# Patient Record
Sex: FEMALE | Race: WHITE | NOT HISPANIC OR LATINO | URBAN - METROPOLITAN AREA
[De-identification: names, ages, dates, MRNs, and addresses within clinical notes are randomized per-mention and may not be internally consistent; named-entity substitution may affect disease eponyms.]

---

## 2024-11-01 ENCOUNTER — OUTPATIENT (OUTPATIENT)
Dept: OUTPATIENT SERVICES | Facility: HOSPITAL | Age: 79
LOS: 1 days | End: 2024-11-01
Payer: MEDICARE

## 2024-11-01 VITALS
RESPIRATION RATE: 16 BRPM | TEMPERATURE: 98 F | DIASTOLIC BLOOD PRESSURE: 82 MMHG | SYSTOLIC BLOOD PRESSURE: 140 MMHG | OXYGEN SATURATION: 97 % | HEIGHT: 64 IN | WEIGHT: 164.91 LBS | HEART RATE: 65 BPM

## 2024-11-01 DIAGNOSIS — Z95.0 PRESENCE OF CARDIAC PACEMAKER: Chronic | ICD-10-CM

## 2024-11-01 DIAGNOSIS — I48.91 UNSPECIFIED ATRIAL FIBRILLATION: ICD-10-CM

## 2024-11-01 DIAGNOSIS — M41.20 OTHER IDIOPATHIC SCOLIOSIS, SITE UNSPECIFIED: ICD-10-CM

## 2024-11-01 DIAGNOSIS — Z01.818 ENCOUNTER FOR OTHER PREPROCEDURAL EXAMINATION: ICD-10-CM

## 2024-11-01 DIAGNOSIS — G89.29 OTHER CHRONIC PAIN: ICD-10-CM

## 2024-11-01 DIAGNOSIS — Z98.82 BREAST IMPLANT STATUS: Chronic | ICD-10-CM

## 2024-11-01 DIAGNOSIS — Z98.890 OTHER SPECIFIED POSTPROCEDURAL STATES: Chronic | ICD-10-CM

## 2024-11-01 LAB
ANION GAP SERPL CALC-SCNC: 13 MMOL/L — SIGNIFICANT CHANGE UP (ref 5–17)
BLD GP AB SCN SERPL QL: NEGATIVE — SIGNIFICANT CHANGE UP
BUN SERPL-MCNC: 24 MG/DL — HIGH (ref 7–23)
CALCIUM SERPL-MCNC: 10.1 MG/DL — SIGNIFICANT CHANGE UP (ref 8.4–10.5)
CHLORIDE SERPL-SCNC: 100 MMOL/L — SIGNIFICANT CHANGE UP (ref 96–108)
CO2 SERPL-SCNC: 27 MMOL/L — SIGNIFICANT CHANGE UP (ref 22–31)
CREAT SERPL-MCNC: 0.78 MG/DL — SIGNIFICANT CHANGE UP (ref 0.5–1.3)
EGFR: 77 ML/MIN/1.73M2 — SIGNIFICANT CHANGE UP
GLUCOSE SERPL-MCNC: 100 MG/DL — HIGH (ref 70–99)
HCT VFR BLD CALC: 41.9 % — SIGNIFICANT CHANGE UP (ref 34.5–45)
HGB BLD-MCNC: 14 G/DL — SIGNIFICANT CHANGE UP (ref 11.5–15.5)
MCHC RBC-ENTMCNC: 30.7 PG — SIGNIFICANT CHANGE UP (ref 27–34)
MCHC RBC-ENTMCNC: 33.4 G/DL — SIGNIFICANT CHANGE UP (ref 32–36)
MCV RBC AUTO: 91.9 FL — SIGNIFICANT CHANGE UP (ref 80–100)
NRBC # BLD: 0 /100 WBCS — SIGNIFICANT CHANGE UP (ref 0–0)
PLATELET # BLD AUTO: 215 K/UL — SIGNIFICANT CHANGE UP (ref 150–400)
POTASSIUM SERPL-MCNC: 4 MMOL/L — SIGNIFICANT CHANGE UP (ref 3.5–5.3)
POTASSIUM SERPL-SCNC: 4 MMOL/L — SIGNIFICANT CHANGE UP (ref 3.5–5.3)
RBC # BLD: 4.56 M/UL — SIGNIFICANT CHANGE UP (ref 3.8–5.2)
RBC # FLD: 13.2 % — SIGNIFICANT CHANGE UP (ref 10.3–14.5)
RH IG SCN BLD-IMP: POSITIVE — SIGNIFICANT CHANGE UP
SODIUM SERPL-SCNC: 140 MMOL/L — SIGNIFICANT CHANGE UP (ref 135–145)
WBC # BLD: 4.3 K/UL — SIGNIFICANT CHANGE UP (ref 3.8–10.5)
WBC # FLD AUTO: 4.3 K/UL — SIGNIFICANT CHANGE UP (ref 3.8–10.5)

## 2024-11-01 NOTE — H&P PST ADULT - PROBLEM SELECTOR PLAN 1
Instructed to continue Metoprolol to OR  Last dose Xarelto 11/10/24 (per surgeon/ cardiologist advise)

## 2024-11-01 NOTE — H&P PST ADULT - NSICDXPASTSURGICALHX_GEN_ALL_CORE_FT
PAST SURGICAL HISTORY:  H/O breast augmentation     H/O cardiac radiofrequency ablation     Status cardiac pacemaker

## 2024-11-01 NOTE — H&P PST ADULT - PROBLEM SELECTOR PLAN 2
T11-Pelvis posterior stabilization and fusion on 11/19/2024.  Pre op cbc, bmp, aic, t/s & MRSA/MSSA sent.    Instructed to inform surgeon & seek medical attention if any changes in health  status like fever, chills, rash, infections, chest pain or any changes in health status l . PST  instructions given in written/ explained about NPO/ with ERP hydration sheet, PREOP SKIN CARE  with  5% Benzyl  peroxide wash (to back  posterior area) & antibacterial chlorhexidine  skin wash x3 days preop with [place] given with verbal/written .   ARRIVAL TIME  2 hours prior to OR time. Pre-op education provided - all questions answered. Pt verbalized understanding.    Chronic pain consult

## 2024-11-01 NOTE — H&P PST ADULT - NSICDXPASTMEDICALHX_GEN_ALL_CORE_FT
PAST MEDICAL HISTORY:  Afib     Chronic back pain     H/O atrial flutter     H/O Hashimoto thyroiditis     H/O heart block     H/O sick sinus syndrome     HTN (hypertension)

## 2024-11-01 NOTE — H&P PST ADULT - CARDIOVASCULAR COMMENTS
PPM left afib / atrial flutter in 2013 with pauses & sick sinus syndrome , had ablation  in 2016 /placed  on Xarelto & dual chamber PPM ( 2013) dependant placed checked in 06/2024

## 2024-11-01 NOTE — H&P PST ADULT - NSICDXPROCEDURE_GEN_ALL_CORE_FT
PROCEDURES:  Posterior lumbar interbody fusion (PLIF) with instrumentation 01-Nov-2024 08:31:18 T11-Pelvis posterior stabilization and fusion on 11/19/2024. Johnnie Kasper

## 2024-11-01 NOTE — H&P PST ADULT - ASSESSMENT
Chronic back pain   Activity: Walks indoor, exercise with PT cardio/ work up for  30 min 2-3x/week, still working full time, home chores  Energy Expenditure score (DASI SCORE METS): 6.4  Symptoms : denies chest pain, palpitations, dyspnea, dizziness or  REA,  Dental: Patient denies Loose teeth/Denture.   ? CAPRINI SCORE    AGE RELATED RISK FACTORS                                                             [ ] Age 41-60 years                                            (1 Point)  [ ] Age: 61-74 years                                           (2 Points)                 [x ] Age= 75 years                                                (3 Points)             DISEASE RELATED RISK FACTORS                                                       [ ] Edema in the lower extremities                 (1 Point)                     [ ] Varicose veins                                               (1 Point)                                 [x ] BMI > 25 Kg/m2                                            (1 Point)                                  [ ] Serious infection (ie PNA)                            (1 Point)                     [ ] Lung disease ( COPD, Emphysema)            (1 Point)                                                                          [ ] Acute myocardial infarction                         (1 Point)                  [ ] Congestive heart failure (in the previous month)  (1 Point)         [ ] Inflammatory bowel disease                            (1 Point)                  [ ] Central venous access, PICC or Port               (2 points)       (within the last month)                                                                [ ] Stroke (in the previous month)                        (5 Points)    [ ] Previous or present malignancy                       (2 points)                                                                                                                                                         HEMATOLOGY RELATED FACTORS                                                         [ ] Prior episodes of VTE                                     (3 Points)                     [ ] Positive family history for VTE                      (3 Points)                  [ ] Prothrombin 04989 A                                     (3 Points)                     [ ] Factor V Leiden                                                (3 Points)                        [ ] Lupus anticoagulants                                      (3 Points)                                                           [ ] Anticardiolipin antibodies                              (3 Points)                                                       [ ] High homocysteine in the blood                   (3 Points)                                             [ ] Other congenital or acquired thrombophilia      (3 Points)                                                [ ] Heparin induced thrombocytopenia                  (3 Points)                                        MOBILITY RELATED FACTORS  [ ] Bed rest                                                         (1 Point)  [ ] Plaster cast                                                    (2 points)  [ ] Bed bound for more than 72 hours           (2 Points)    GENDER SPECIFIC FACTORS  [ ] Pregnancy or had a baby within the last month   (1 Point)  [ ] Post-partum < 6 weeks                                   (1 Point)  [ ] Hormonal therapy  or oral contraception   (1 Point)  [ ] History of pregnancy complications              (1 point)  [ ] Unexplained or recurrent              (1 Point)    OTHER RISK FACTORS                                           (1 Point)  [ ] BMI >40, smoking, diabetes requiring insulin, chemotherapy  blood transfusions and length of surgery over 2 hours    SURGERY RELATED RISK FACTORS  [ ]  Section within the last month     (1 Point)  [ ] Minor surgery                                                  (1 Point)  [ ] Arthroscopic surgery                                       (2 Points)  [ x] Planned major surgery lasting more            (2 Points)      than 45 minutes     [ ] Elective hip or knee joint replacement       (5 points)       surgery                                                TRAUMA RELATED RISK FACTORS  [ ] Fracture of the hip, pelvis, or leg                       (5 Points)  [ ] Spinal cord injury resulting in paralysis             (5 points)       (in the previous month)    [ ] Paralysis  (less than 1 month)                             (5 Points)  [ ] Multiple Trauma within 1 month                        (5 Points)    Total Score [   6     ]    Caprini Score 0-2: Low Risk, NO VTE prophylaxis required for most patients, encourage ambulation  Caprini Score 3-6: Moderate Risk , pharmacologic VTE prophylaxis is indicated for most patients (in the absence of contraindications)  Caprini Score Greater than or =7: High risk, pharmocologic VTE prophylaxis indicated for most patients (in the absence of contraindications)

## 2024-11-01 NOTE — H&P PST ADULT - HISTORY OF PRESENT ILLNESS
79 year  old RHD  female  with  c/o non radiating lower back pain for  many years, pain become more  troublesome for last 4 yrs  with  severe pain and lower extremity weakness of which sometimes worse in right side is worse . She tried all pain management modalities like NSAIDS, , physical therapy, epidural  injects and facet blocks with no relief at all".   Pt states she is having scoliosis which made symptoms worse with few  episodes of right side sciatic pain. Her pain increases with ambulation and movement and decreases with sitting. Worse pain is standing still. No recent trauma. S/p surgical consult & scheduled for T11-Pelvis posterior stabilization and fusion on 11/19/2024.    PMH : HTN, HLD, Afib atrial flutter in 2013 ( on Xarelto per patient surgeon advised to stop for 10 days last dose 11/10/24), h/o sick sinus syndrome/ haert block,  atrial flutter s/p cardiac ablation x1 ( 2016), PPM  dependent dual chamber status since 2013( revised / replaced batter in 2023/last interrogation in 06/2024). history of breast augmentation  (silicon breast implants) & chronic lower back pain.  ?

## 2024-11-02 LAB
A1C WITH ESTIMATED AVERAGE GLUCOSE RESULT: 5.4 % — SIGNIFICANT CHANGE UP (ref 4–5.6)
ESTIMATED AVERAGE GLUCOSE: 108 MG/DL — SIGNIFICANT CHANGE UP (ref 68–114)
MRSA PCR RESULT.: SIGNIFICANT CHANGE UP
S AUREUS DNA NOSE QL NAA+PROBE: SIGNIFICANT CHANGE UP

## 2024-11-20 ENCOUNTER — APPOINTMENT (OUTPATIENT)
Dept: NEUROSURGERY | Facility: HOSPITAL | Age: 79
End: 2024-11-20

## 2024-11-20 ENCOUNTER — INPATIENT (INPATIENT)
Facility: HOSPITAL | Age: 79
LOS: 11 days | Discharge: NURSING, REHAB OR LONG-TERM CARE | DRG: 427 | End: 2024-12-02
Attending: NEUROLOGICAL SURGERY | Admitting: NEUROLOGICAL SURGERY
Payer: MEDICARE

## 2024-11-20 VITALS
OXYGEN SATURATION: 95 % | RESPIRATION RATE: 18 BRPM | WEIGHT: 164.91 LBS | TEMPERATURE: 98 F | HEART RATE: 73 BPM | HEIGHT: 64.02 IN | DIASTOLIC BLOOD PRESSURE: 76 MMHG | SYSTOLIC BLOOD PRESSURE: 154 MMHG

## 2024-11-20 DIAGNOSIS — Z95.0 PRESENCE OF CARDIAC PACEMAKER: Chronic | ICD-10-CM

## 2024-11-20 DIAGNOSIS — G89.29 OTHER CHRONIC PAIN: ICD-10-CM

## 2024-11-20 DIAGNOSIS — Z98.82 BREAST IMPLANT STATUS: Chronic | ICD-10-CM

## 2024-11-20 DIAGNOSIS — M41.20 OTHER IDIOPATHIC SCOLIOSIS, SITE UNSPECIFIED: ICD-10-CM

## 2024-11-20 DIAGNOSIS — Z98.890 OTHER SPECIFIED POSTPROCEDURAL STATES: Chronic | ICD-10-CM

## 2024-11-20 LAB
ADD ON TEST-SPECIMEN IN LAB: SIGNIFICANT CHANGE UP
ANION GAP SERPL CALC-SCNC: 17 MMOL/L — SIGNIFICANT CHANGE UP (ref 5–17)
APTT BLD: 27.9 SEC — SIGNIFICANT CHANGE UP (ref 24.5–35.6)
APTT BLD: 30.3 SEC — SIGNIFICANT CHANGE UP (ref 24.5–35.6)
BASOPHILS # BLD AUTO: 0.01 K/UL — SIGNIFICANT CHANGE UP (ref 0–0.2)
BASOPHILS NFR BLD AUTO: 0.1 % — SIGNIFICANT CHANGE UP (ref 0–2)
BUN SERPL-MCNC: 19 MG/DL — SIGNIFICANT CHANGE UP (ref 7–23)
CALCIUM SERPL-MCNC: 8.4 MG/DL — SIGNIFICANT CHANGE UP (ref 8.4–10.5)
CHLORIDE SERPL-SCNC: 102 MMOL/L — SIGNIFICANT CHANGE UP (ref 96–108)
CO2 SERPL-SCNC: 22 MMOL/L — SIGNIFICANT CHANGE UP (ref 22–31)
CREAT SERPL-MCNC: 0.74 MG/DL — SIGNIFICANT CHANGE UP (ref 0.5–1.3)
EGFR: 82 ML/MIN/1.73M2 — SIGNIFICANT CHANGE UP
EOSINOPHIL # BLD AUTO: 0.01 K/UL — SIGNIFICANT CHANGE UP (ref 0–0.5)
EOSINOPHIL NFR BLD AUTO: 0.1 % — SIGNIFICANT CHANGE UP (ref 0–6)
GAS PNL BLDA: SIGNIFICANT CHANGE UP
GLUCOSE BLDC GLUCOMTR-MCNC: 113 MG/DL — HIGH (ref 70–99)
GLUCOSE SERPL-MCNC: 162 MG/DL — HIGH (ref 70–99)
HCT VFR BLD CALC: 28.2 % — LOW (ref 34.5–45)
HCT VFR BLD CALC: 29.5 % — LOW (ref 34.5–45)
HGB BLD-MCNC: 9.4 G/DL — LOW (ref 11.5–15.5)
HGB BLD-MCNC: 9.9 G/DL — LOW (ref 11.5–15.5)
IMM GRANULOCYTES NFR BLD AUTO: 0.8 % — SIGNIFICANT CHANGE UP (ref 0–0.9)
INR BLD: 1.03 RATIO — SIGNIFICANT CHANGE UP (ref 0.85–1.16)
INR BLD: 1.06 RATIO — SIGNIFICANT CHANGE UP (ref 0.85–1.16)
LYMPHOCYTES # BLD AUTO: 0.62 K/UL — LOW (ref 1–3.3)
LYMPHOCYTES # BLD AUTO: 7.2 % — LOW (ref 13–44)
MAGNESIUM SERPL-MCNC: 2 MG/DL — SIGNIFICANT CHANGE UP (ref 1.6–2.6)
MCHC RBC-ENTMCNC: 29.2 PG — SIGNIFICANT CHANGE UP (ref 27–34)
MCHC RBC-ENTMCNC: 29.5 PG — SIGNIFICANT CHANGE UP (ref 27–34)
MCHC RBC-ENTMCNC: 33.3 G/DL — SIGNIFICANT CHANGE UP (ref 32–36)
MCHC RBC-ENTMCNC: 33.6 G/DL — SIGNIFICANT CHANGE UP (ref 32–36)
MCV RBC AUTO: 87.6 FL — SIGNIFICANT CHANGE UP (ref 80–100)
MCV RBC AUTO: 87.8 FL — SIGNIFICANT CHANGE UP (ref 80–100)
MONOCYTES # BLD AUTO: 0.62 K/UL — SIGNIFICANT CHANGE UP (ref 0–0.9)
MONOCYTES NFR BLD AUTO: 7.2 % — SIGNIFICANT CHANGE UP (ref 2–14)
NEUTROPHILS # BLD AUTO: 7.28 K/UL — SIGNIFICANT CHANGE UP (ref 1.8–7.4)
NEUTROPHILS NFR BLD AUTO: 84.6 % — HIGH (ref 43–77)
NRBC # BLD: 0 /100 WBCS — SIGNIFICANT CHANGE UP (ref 0–0)
NRBC # BLD: 0 /100 WBCS — SIGNIFICANT CHANGE UP (ref 0–0)
PHOSPHATE SERPL-MCNC: 4 MG/DL — SIGNIFICANT CHANGE UP (ref 2.5–4.5)
PLATELET # BLD AUTO: 176 K/UL — SIGNIFICANT CHANGE UP (ref 150–400)
PLATELET # BLD AUTO: 207 K/UL — SIGNIFICANT CHANGE UP (ref 150–400)
POTASSIUM SERPL-MCNC: 3.8 MMOL/L — SIGNIFICANT CHANGE UP (ref 3.5–5.3)
POTASSIUM SERPL-SCNC: 3.8 MMOL/L — SIGNIFICANT CHANGE UP (ref 3.5–5.3)
PROTHROM AB SERPL-ACNC: 11.7 SEC — SIGNIFICANT CHANGE UP (ref 9.9–13.4)
PROTHROM AB SERPL-ACNC: 12.1 SEC — SIGNIFICANT CHANGE UP (ref 9.9–13.4)
RBC # BLD: 3.22 M/UL — LOW (ref 3.8–5.2)
RBC # BLD: 3.36 M/UL — LOW (ref 3.8–5.2)
RBC # FLD: 14.2 % — SIGNIFICANT CHANGE UP (ref 10.3–14.5)
RBC # FLD: 14.6 % — HIGH (ref 10.3–14.5)
SODIUM SERPL-SCNC: 141 MMOL/L — SIGNIFICANT CHANGE UP (ref 135–145)
WBC # BLD: 10.34 K/UL — SIGNIFICANT CHANGE UP (ref 3.8–10.5)
WBC # BLD: 8.61 K/UL — SIGNIFICANT CHANGE UP (ref 3.8–10.5)
WBC # FLD AUTO: 10.34 K/UL — SIGNIFICANT CHANGE UP (ref 3.8–10.5)
WBC # FLD AUTO: 8.61 K/UL — SIGNIFICANT CHANGE UP (ref 3.8–10.5)

## 2024-11-20 PROCEDURE — 22843 INSERT SPINE FIXATION DEVICE: CPT

## 2024-11-20 PROCEDURE — 22216 INCIS ADDL SPINE SEGMENT: CPT

## 2024-11-20 PROCEDURE — 86923 COMPATIBILITY TEST ELECTRIC: CPT

## 2024-11-20 PROCEDURE — 86900 BLOOD TYPING SEROLOGIC ABO: CPT

## 2024-11-20 PROCEDURE — 22614 ARTHRD PST TQ 1NTRSPC EA ADD: CPT

## 2024-11-20 PROCEDURE — 80048 BASIC METABOLIC PNL TOTAL CA: CPT

## 2024-11-20 PROCEDURE — 87640 STAPH A DNA AMP PROBE: CPT

## 2024-11-20 PROCEDURE — 22853 INSJ BIOMECHANICAL DEVICE: CPT

## 2024-11-20 PROCEDURE — 85027 COMPLETE CBC AUTOMATED: CPT

## 2024-11-20 PROCEDURE — 22634 ARTHRD CMBN 1NTRSPC EA ADDL: CPT

## 2024-11-20 PROCEDURE — 22214 INCIS 1 VERTEBRAL SEG LUMBAR: CPT

## 2024-11-20 PROCEDURE — G0463: CPT

## 2024-11-20 PROCEDURE — 72080 X-RAY EXAM THORACOLMB 2/> VW: CPT | Mod: 26

## 2024-11-20 PROCEDURE — 22633 ARTHRD CMBN 1NTRSPC LUMBAR: CPT | Mod: 22

## 2024-11-20 PROCEDURE — 71045 X-RAY EXAM CHEST 1 VIEW: CPT | Mod: 26

## 2024-11-20 PROCEDURE — 83036 HEMOGLOBIN GLYCOSYLATED A1C: CPT

## 2024-11-20 PROCEDURE — 86850 RBC ANTIBODY SCREEN: CPT

## 2024-11-20 PROCEDURE — 27280 ARTHR SI JT OPN B1GRF INSTRM: CPT | Mod: 50

## 2024-11-20 PROCEDURE — 87641 MR-STAPH DNA AMP PROBE: CPT

## 2024-11-20 PROCEDURE — 86901 BLOOD TYPING SEROLOGIC RH(D): CPT

## 2024-11-20 DEVICE — KIT A-LINE 1LUM 20G X 12CM SAFE KIT: Type: IMPLANTABLE DEVICE | Site: BILATERAL | Status: FUNCTIONAL

## 2024-11-20 DEVICE — GRAFT BONE INFUSE KIT LG II: Type: IMPLANTABLE DEVICE | Site: BILATERAL | Status: FUNCTIONAL

## 2024-11-20 DEVICE — IMPLANTABLE DEVICE: Type: IMPLANTABLE DEVICE | Site: BILATERAL | Status: FUNCTIONAL

## 2024-11-20 DEVICE — SURGIFOAM PAD 8CM X 12.5CM X 10MM (100): Type: IMPLANTABLE DEVICE | Site: BILATERAL | Status: FUNCTIONAL

## 2024-11-20 DEVICE — IMP CEMENT SYS CONFIDENCE KIT 5CC: Type: IMPLANTABLE DEVICE | Site: BILATERAL | Status: FUNCTIONAL

## 2024-11-20 DEVICE — SET SCREW SPINE T27 5.5/6.0MM: Type: IMPLANTABLE DEVICE | Site: BILATERAL | Status: FUNCTIONAL

## 2024-11-20 DEVICE — FLOSEAL WITH RECOTHROM THROMBIN 10ML: Type: IMPLANTABLE DEVICE | Site: BILATERAL | Status: FUNCTIONAL

## 2024-11-20 RX ORDER — METOPROLOL TARTRATE 100 MG/1
75 TABLET, FILM COATED ORAL
Refills: 0 | Status: DISCONTINUED | OUTPATIENT
Start: 2024-11-20 | End: 2024-11-22

## 2024-11-20 RX ORDER — OXYCODONE HYDROCHLORIDE 30 MG/1
5 TABLET ORAL EVERY 4 HOURS
Refills: 0 | Status: DISCONTINUED | OUTPATIENT
Start: 2024-11-20 | End: 2024-11-21

## 2024-11-20 RX ORDER — LIDOCAINE HCL 20 MG/ML
0.2 VIAL (ML) INJECTION ONCE
Refills: 0 | Status: DISCONTINUED | OUTPATIENT
Start: 2024-11-20 | End: 2024-11-20

## 2024-11-20 RX ORDER — APREPITANT 40 MG/1
40 CAPSULE ORAL ONCE
Refills: 0 | Status: COMPLETED | OUTPATIENT
Start: 2024-11-20 | End: 2024-11-20

## 2024-11-20 RX ORDER — ACETAMINOPHEN 500MG 500 MG/1
1000 TABLET, COATED ORAL ONCE
Refills: 0 | Status: COMPLETED | OUTPATIENT
Start: 2024-11-20 | End: 2024-11-20

## 2024-11-20 RX ORDER — HYDROMORPHONE HYDROCHLORIDE 2 MG/1
30 TABLET ORAL
Refills: 0 | Status: DISCONTINUED | OUTPATIENT
Start: 2024-11-20 | End: 2024-11-21

## 2024-11-20 RX ORDER — TRIAMTERENE/HYDROCHLOROTHIAZID 37.5-25 MG
1 CAPSULE ORAL
Refills: 0 | DISCHARGE

## 2024-11-20 RX ORDER — ROSUVASTATIN CALCIUM 10 MG
1 TABLET ORAL
Refills: 0 | DISCHARGE

## 2024-11-20 RX ORDER — METOPROLOL TARTRATE 50 MG
1 TABLET ORAL
Refills: 0 | DISCHARGE

## 2024-11-20 RX ORDER — SENNOSIDES 8.6 MG
2 TABLET ORAL AT BEDTIME
Refills: 0 | Status: DISCONTINUED | OUTPATIENT
Start: 2024-11-20 | End: 2024-11-22

## 2024-11-20 RX ORDER — METOPROLOL TARTRATE 100 MG/1
75 TABLET, FILM COATED ORAL
Refills: 0 | Status: DISCONTINUED | OUTPATIENT
Start: 2024-11-20 | End: 2024-11-20

## 2024-11-20 RX ORDER — HYDROMORPHONE HYDROCHLORIDE 2 MG/1
0.2 TABLET ORAL
Refills: 0 | Status: DISCONTINUED | OUTPATIENT
Start: 2024-11-20 | End: 2024-11-20

## 2024-11-20 RX ORDER — 0.9 % SODIUM CHLORIDE 0.9 %
1000 INTRAVENOUS SOLUTION INTRAVENOUS
Refills: 0 | Status: DISCONTINUED | OUTPATIENT
Start: 2024-11-20 | End: 2024-11-22

## 2024-11-20 RX ORDER — CEFAZOLIN SODIUM 10 G
2000 VIAL (EA) INJECTION EVERY 8 HOURS
Refills: 0 | Status: COMPLETED | OUTPATIENT
Start: 2024-11-20 | End: 2024-11-25

## 2024-11-20 RX ORDER — HYDROMORPHONE HYDROCHLORIDE 2 MG/1
0.5 TABLET ORAL
Refills: 0 | Status: DISCONTINUED | OUTPATIENT
Start: 2024-11-20 | End: 2024-11-20

## 2024-11-20 RX ORDER — ACETAMINOPHEN 500MG 500 MG/1
1000 TABLET, COATED ORAL EVERY 8 HOURS
Refills: 0 | Status: DISCONTINUED | OUTPATIENT
Start: 2024-11-20 | End: 2024-11-20

## 2024-11-20 RX ORDER — NICARDIPINE HYDROCHLORIDE 2.5 MG/ML
5 INJECTION INTRAVENOUS
Qty: 40 | Refills: 0 | Status: DISCONTINUED | OUTPATIENT
Start: 2024-11-20 | End: 2024-11-21

## 2024-11-20 RX ORDER — PROPOFOL 10 MG/ML
50 INJECTION, EMULSION INTRAVENOUS
Qty: 1000 | Refills: 0 | Status: DISCONTINUED | OUTPATIENT
Start: 2024-11-20 | End: 2024-11-21

## 2024-11-20 RX ORDER — NALOXONE HCL 0.4 MG/ML
0.1 AMPUL (ML) INJECTION
Refills: 0 | Status: DISCONTINUED | OUTPATIENT
Start: 2024-11-20 | End: 2024-11-21

## 2024-11-20 RX ORDER — TRIAMTERENE AND HYDROCHLOROTHIAZIDE 25; 37.5 MG/1; MG/1
1 CAPSULE ORAL DAILY
Refills: 0 | Status: DISCONTINUED | OUTPATIENT
Start: 2024-11-20 | End: 2024-11-22

## 2024-11-20 RX ORDER — CHLORHEXIDINE GLUCONATE 1.2 MG/ML
1 RINSE ORAL DAILY
Refills: 0 | Status: DISCONTINUED | OUTPATIENT
Start: 2024-11-20 | End: 2024-11-22

## 2024-11-20 RX ORDER — CHLORHEXIDINE GLUCONATE 1.2 MG/ML
1 RINSE ORAL ONCE
Refills: 0 | Status: DISCONTINUED | OUTPATIENT
Start: 2024-11-20 | End: 2024-11-20

## 2024-11-20 RX ORDER — ONDANSETRON HYDROCHLORIDE 4 MG/1
4 TABLET, FILM COATED ORAL ONCE
Refills: 0 | Status: DISCONTINUED | OUTPATIENT
Start: 2024-11-20 | End: 2024-11-22

## 2024-11-20 RX ORDER — CHLORHEXIDINE GLUCONATE 1.2 MG/ML
15 RINSE ORAL EVERY 12 HOURS
Refills: 0 | Status: DISCONTINUED | OUTPATIENT
Start: 2024-11-20 | End: 2024-11-21

## 2024-11-20 RX ORDER — PROPOFOL 10 MG/ML
10 INJECTION, EMULSION INTRAVENOUS
Qty: 1000 | Refills: 0 | Status: DISCONTINUED | OUTPATIENT
Start: 2024-11-20 | End: 2024-11-20

## 2024-11-20 RX ORDER — CEFAZOLIN SODIUM 10 G
2000 VIAL (EA) INJECTION ONCE
Refills: 0 | Status: COMPLETED | OUTPATIENT
Start: 2024-11-20 | End: 2024-11-20

## 2024-11-20 RX ORDER — ACETAMINOPHEN 500MG 500 MG/1
650 TABLET, COATED ORAL EVERY 6 HOURS
Refills: 0 | Status: DISCONTINUED | OUTPATIENT
Start: 2024-11-20 | End: 2024-11-22

## 2024-11-20 RX ORDER — DEXMEDETOMIDINE HYDROCHLORIDE 4 UG/ML
0.2 INJECTION, SOLUTION INTRAVENOUS
Qty: 200 | Refills: 0 | Status: DISCONTINUED | OUTPATIENT
Start: 2024-11-20 | End: 2024-11-21

## 2024-11-20 RX ORDER — SODIUM CHLORIDE 9 MG/ML
3 INJECTION, SOLUTION INTRAMUSCULAR; INTRAVENOUS; SUBCUTANEOUS EVERY 8 HOURS
Refills: 0 | Status: DISCONTINUED | OUTPATIENT
Start: 2024-11-20 | End: 2024-11-20

## 2024-11-20 RX ORDER — GABAPENTIN 300 MG/1
300 CAPSULE ORAL
Refills: 0 | Status: DISCONTINUED | OUTPATIENT
Start: 2024-11-20 | End: 2024-11-21

## 2024-11-20 RX ORDER — POLYETHYLENE GLYCOL 3350 17 G/17G
17 POWDER, FOR SOLUTION ORAL DAILY
Refills: 0 | Status: DISCONTINUED | OUTPATIENT
Start: 2024-11-20 | End: 2024-11-21

## 2024-11-20 RX ORDER — METHOCARBAMOL 500 MG/1
750 TABLET, FILM COATED ORAL EVERY 8 HOURS
Refills: 0 | Status: DISCONTINUED | OUTPATIENT
Start: 2024-11-20 | End: 2024-11-22

## 2024-11-20 RX ORDER — ROSUVASTATIN CALCIUM 5 MG/1
10 TABLET, FILM COATED ORAL AT BEDTIME
Refills: 0 | Status: DISCONTINUED | OUTPATIENT
Start: 2024-11-20 | End: 2024-11-22

## 2024-11-20 RX ORDER — POTASSIUM CHLORIDE 600 MG/1
10 TABLET, EXTENDED RELEASE ORAL
Refills: 0 | Status: COMPLETED | OUTPATIENT
Start: 2024-11-20 | End: 2024-11-21

## 2024-11-20 RX ORDER — ONDANSETRON HYDROCHLORIDE 4 MG/1
4 TABLET, FILM COATED ORAL EVERY 6 HOURS
Refills: 0 | Status: DISCONTINUED | OUTPATIENT
Start: 2024-11-20 | End: 2024-11-20

## 2024-11-20 RX ADMIN — Medication 100 MILLIGRAM(S): at 21:24

## 2024-11-20 RX ADMIN — Medication 0.5 MILLILITER(S): at 23:59

## 2024-11-20 RX ADMIN — Medication 75 MILLILITER(S): at 21:30

## 2024-11-20 RX ADMIN — APREPITANT 40 MILLIGRAM(S): 40 CAPSULE ORAL at 06:29

## 2024-11-20 RX ADMIN — ACETAMINOPHEN 500MG 1000 MILLIGRAM(S): 500 TABLET, COATED ORAL at 06:28

## 2024-11-20 RX ADMIN — DEXMEDETOMIDINE HYDROCHLORIDE 3.74 MICROGRAM(S)/KG/HR: 4 INJECTION, SOLUTION INTRAVENOUS at 21:23

## 2024-11-20 RX ADMIN — POTASSIUM CHLORIDE 100 MILLIEQUIVALENT(S): 600 TABLET, EXTENDED RELEASE ORAL at 23:00

## 2024-11-20 NOTE — PROGRESS NOTE ADULT - ASSESSMENT
ASSESSMENT/PLAN:    NEURO:  Activity: [] mobilize as tolerated [] Bedrest [] PT [] OT [] PMNR    PULM:    CV:  SBP goal    RENAL:  Fluids:    GI:  Diet:  GI prophylaxis [] not indicated [] PPI [] other:  Bowel regimen [] colace [] senna [] other:    ENDO:   Goal euglycemia (-180)    HEME/ONC:  VTE prophylaxis: [] SCDs [] chemoprophylaxis [] hold chemoprophylaxis due to: [] high risk of DVT/PE on admission due to:    ID:    MISC:    SOCIAL/FAMILY:  [] awaiting [] updated at bedside [] family meeting    CODE STATUS:  [] Full Code [] DNR [] DNI [] Palliative/Comfort Care    DISPOSITION:  [] ICU [] Stroke Unit [] Floor [] EMU [] RCU [] PCU    [] Patient is at high risk of neurologic deterioration/death due to:     Time seen:  Time spent: ___ [] critical care minutes    Contact: [phone] ASSESSMENT/PLAN:    NEURO:  -POD0 T11-pelvis fusion, L4-5, L5-S1 TLIFs, T12-L1, L1-2, L3-4 PCOs.   -CT T/L-spine pending.  -TLSO brace for OOB.  -2 deep HMVs, 1 superficial HMV, Prevena per NSGY.  -Standing scoli XR films pending.   Activity: [] mobilize as tolerated [] Bedrest [] PT [] OT [] PMNR    PULM:  Wean to extubate.  Racemic epi given absence of cuff leak.    CV:  SBP goal 100-160.    RENAL:  Fluids: IV LR at 75cc/hr    GI:  Diet: NPO for possible extubation in AM  GI prophylaxis [] not indicated [X] PPI [] other:  Bowel regimen [] colace [] senna [] other: Miralax    ENDO:   Goal euglycemia (-180)    HEME/ONC:  VTE prophylaxis: SCDs  Received 3u pRBC, 3u FFP, and 2u plt intraop.    ID:  Ancef while HMV drains in.    MISC:    SOCIAL/FAMILY:  [] awaiting [X] updated at bedside [] family meeting    CODE STATUS:  [X] Full Code [] DNR [] DNI [] Palliative/Comfort Care    DISPOSITION:  [X] ICU [] Stroke Unit [] Floor [] EMU [] RCU [] PCU

## 2024-11-20 NOTE — BRIEF OPERATIVE NOTE - OPERATION/FINDINGS
T11-pelvis instrumentation and fusion; L4-L5, L5-S1 TLIFs, T12-L1, L1-L2, L2-L3, L3-L4 posterior column osteotomies for coronal scoliosis deformity correction

## 2024-11-20 NOTE — PROGRESS NOTE ADULT - SUBJECTIVE AND OBJECTIVE BOX
SUMMARY:    HOSPITAL COURSE:    ADMISSION SCORES:   GCS: HH: MF: NIHSS: ICH Score:    REVIEW OF SYSTEMS: None other than stated in HPI    ALLERGIES: Allergies    No Known Allergies    Intolerances        VITALS/DATA/ORDERS:    T(C): 36.7 (11-20-24 @ 20:15), Max: 36.7 (11-20-24 @ 20:15)  HR: 60 (11-20-24 @ 22:30) (60 - 92)  BP: 154/76 (11-20-24 @ 06:51) (154/76 - 154/76)  RR: 18 (11-20-24 @ 22:30) (16 - 20)  SpO2: 100% (11-20-24 @ 22:30) (95% - 100%)                          9.4    8.61  )-----------( 176      ( 20 Nov 2024 20:47 )             28.2       11-20    141  |  102  |  19  ----------------------------<  162[H]  3.8   |  22  |  0.74    Ca    8.4      20 Nov 2024 20:47            ABG - ( 20 Nov 2024 17:15 )  pH, Arterial: 7.36  pH, Blood: x     /  pCO2: 45    /  pO2: 187   / HCO3: 25    / Base Excess: -0.3  /  SaO2: 99.4                PT/INR - ( 20 Nov 2024 20:47 )   PT: 11.7 sec;   INR: 1.03 ratio         PTT - ( 20 Nov 2024 20:47 )  PTT:27.9 sec              acetaminophen     Tablet .. 1000 milliGRAM(s) Oral every 8 hours  ceFAZolin   IVPB 2000 milliGRAM(s) IV Intermittent every 8 hours  chlorhexidine 0.12% Liquid 15 milliLiter(s) Oral Mucosa every 12 hours  dexMEDEtomidine Infusion 0.2 MICROgram(s)/kG/Hr IV Continuous <Continuous>  gabapentin 300 milliGRAM(s) Oral two times a day  HYDROmorphone  Injectable 0.2 milliGRAM(s) IV Push every 10 minutes PRN  HYDROmorphone  Injectable 0.5 milliGRAM(s) IV Push every 10 minutes PRN  HYDROmorphone PCA (1 mG/mL) 30 milliLiter(s) PCA Continuous PCA Continuous  lactated ringers. 1000 milliLiter(s) IV Continuous <Continuous>  methocarbamol 750 milliGRAM(s) Oral every 8 hours  metoprolol tartrate 75 milliGRAM(s) Oral two times a day  naloxone Injectable 0.1 milliGRAM(s) IV Push every 3 minutes PRN  niCARdipine Infusion 5 mG/Hr IV Continuous <Continuous>  ondansetron Injectable 4 milliGRAM(s) IV Push every 6 hours PRN  ondansetron Injectable 4 milliGRAM(s) IV Push once PRN  oxyCODONE    IR 5 milliGRAM(s) Oral every 4 hours PRN  potassium chloride  10 mEq/100 mL IVPB 10 milliEquivalent(s) IV Intermittent every 1 hour  propofol Infusion 50 MICROgram(s)/kG/Min IV Continuous <Continuous>  rosuvastatin 10 milliGRAM(s) Oral at bedtime  senna 2 Tablet(s) Oral at bedtime  triamterene 37.5 mG/hydrochlorothiazide 25 mG Tablet 1 Tablet(s) Oral daily      EXAMINATION:  General: No acute distress  HEENT: Anicteric sclerae  Cardiac: [id number]  Lungs: Clear  Abdomen: Soft, non-tender, +BS  Extremities: No c/c/e  Skin/Incision Site: Clean, dry and intact  Neurologic: Awake, alert, fully oriented, follows commands, PERRL, VFFtc, EOMI, face symmetric, tongue midline, no drift, full strength SUMMARY:  79F, admitted for T11-pelvis fusion to address LBP and coronal scoliosis. PMH AFib/flutter (Eliquis stopped for OR), HTN/HLD.     ICU COURSE:  11/20: admitted to NSCU s/p OR    REVIEW OF SYSTEMS: None other than stated in HPI    ALLERGIES: Allergies    No Known Allergies    Intolerances        VITALS/DATA/ORDERS:    T(C): 36.7 (11-20-24 @ 20:15), Max: 36.7 (11-20-24 @ 20:15)  HR: 60 (11-20-24 @ 22:30) (60 - 92)  BP: 154/76 (11-20-24 @ 06:51) (154/76 - 154/76)  RR: 18 (11-20-24 @ 22:30) (16 - 20)  SpO2: 100% (11-20-24 @ 22:30) (95% - 100%)                          9.4    8.61  )-----------( 176      ( 20 Nov 2024 20:47 )             28.2       11-20    141  |  102  |  19  ----------------------------<  162[H]  3.8   |  22  |  0.74    Ca    8.4      20 Nov 2024 20:47            ABG - ( 20 Nov 2024 17:15 )  pH, Arterial: 7.36  pH, Blood: x     /  pCO2: 45    /  pO2: 187   / HCO3: 25    / Base Excess: -0.3  /  SaO2: 99.4                PT/INR - ( 20 Nov 2024 20:47 )   PT: 11.7 sec;   INR: 1.03 ratio         PTT - ( 20 Nov 2024 20:47 )  PTT:27.9 sec              acetaminophen     Tablet .. 1000 milliGRAM(s) Oral every 8 hours  ceFAZolin   IVPB 2000 milliGRAM(s) IV Intermittent every 8 hours  chlorhexidine 0.12% Liquid 15 milliLiter(s) Oral Mucosa every 12 hours  dexMEDEtomidine Infusion 0.2 MICROgram(s)/kG/Hr IV Continuous <Continuous>  gabapentin 300 milliGRAM(s) Oral two times a day  HYDROmorphone  Injectable 0.2 milliGRAM(s) IV Push every 10 minutes PRN  HYDROmorphone  Injectable 0.5 milliGRAM(s) IV Push every 10 minutes PRN  HYDROmorphone PCA (1 mG/mL) 30 milliLiter(s) PCA Continuous PCA Continuous  lactated ringers. 1000 milliLiter(s) IV Continuous <Continuous>  methocarbamol 750 milliGRAM(s) Oral every 8 hours  metoprolol tartrate 75 milliGRAM(s) Oral two times a day  naloxone Injectable 0.1 milliGRAM(s) IV Push every 3 minutes PRN  niCARdipine Infusion 5 mG/Hr IV Continuous <Continuous>  ondansetron Injectable 4 milliGRAM(s) IV Push every 6 hours PRN  ondansetron Injectable 4 milliGRAM(s) IV Push once PRN  oxyCODONE    IR 5 milliGRAM(s) Oral every 4 hours PRN  potassium chloride  10 mEq/100 mL IVPB 10 milliEquivalent(s) IV Intermittent every 1 hour  propofol Infusion 50 MICROgram(s)/kG/Min IV Continuous <Continuous>  rosuvastatin 10 milliGRAM(s) Oral at bedtime  senna 2 Tablet(s) Oral at bedtime  triamterene 37.5 mG/hydrochlorothiazide 25 mG Tablet 1 Tablet(s) Oral daily      EXAMINATION:  General: No acute distress  HEENT: Anicteric sclerae  Cardiac: [id number]  Lungs: Clear  Abdomen: Soft, non-tender, +BS  Extremities: No c/c/e  Skin/Incision Site: Clean, dry and intact  Neurologic: intubated, FC, LLE 5/5, RHF 4-/5 (pain-limited), RKE 4/5, RDF 4/5, RPF 5/5.

## 2024-11-20 NOTE — PATIENT PROFILE ADULT - FALL HARM RISK - UNIVERSAL INTERVENTIONS
Bed in lowest position, wheels locked, appropriate side rails in place/Call bell, personal items and telephone in reach/Instruct patient to call for assistance before getting out of bed or chair/Non-slip footwear when patient is out of bed/[place] to call system/Physically safe environment - no spills, clutter or unnecessary equipment/Purposeful Proactive Rounding/Room/bathroom lighting operational, light cord in reach

## 2024-11-21 LAB
ANION GAP SERPL CALC-SCNC: 11 MMOL/L — SIGNIFICANT CHANGE UP (ref 5–17)
BUN SERPL-MCNC: 19 MG/DL — SIGNIFICANT CHANGE UP (ref 7–23)
CALCIUM SERPL-MCNC: 8.1 MG/DL — LOW (ref 8.4–10.5)
CHLORIDE SERPL-SCNC: 103 MMOL/L — SIGNIFICANT CHANGE UP (ref 96–108)
CO2 SERPL-SCNC: 24 MMOL/L — SIGNIFICANT CHANGE UP (ref 22–31)
CREAT SERPL-MCNC: 0.65 MG/DL — SIGNIFICANT CHANGE UP (ref 0.5–1.3)
EGFR: 90 ML/MIN/1.73M2 — SIGNIFICANT CHANGE UP
GLUCOSE BLDC GLUCOMTR-MCNC: 145 MG/DL — HIGH (ref 70–99)
GLUCOSE BLDC GLUCOMTR-MCNC: 154 MG/DL — HIGH (ref 70–99)
GLUCOSE SERPL-MCNC: 149 MG/DL — HIGH (ref 70–99)
HCT VFR BLD CALC: 23.6 % — LOW (ref 34.5–45)
HGB BLD-MCNC: 8 G/DL — LOW (ref 11.5–15.5)
MAGNESIUM SERPL-MCNC: 1.9 MG/DL — SIGNIFICANT CHANGE UP (ref 1.6–2.6)
MCHC RBC-ENTMCNC: 29.2 PG — SIGNIFICANT CHANGE UP (ref 27–34)
MCHC RBC-ENTMCNC: 33.9 G/DL — SIGNIFICANT CHANGE UP (ref 32–36)
MCV RBC AUTO: 86.1 FL — SIGNIFICANT CHANGE UP (ref 80–100)
NRBC # BLD: 0 /100 WBCS — SIGNIFICANT CHANGE UP (ref 0–0)
PHOSPHATE SERPL-MCNC: 3.2 MG/DL — SIGNIFICANT CHANGE UP (ref 2.5–4.5)
PLATELET # BLD AUTO: 161 K/UL — SIGNIFICANT CHANGE UP (ref 150–400)
POTASSIUM SERPL-MCNC: 4 MMOL/L — SIGNIFICANT CHANGE UP (ref 3.5–5.3)
POTASSIUM SERPL-SCNC: 4 MMOL/L — SIGNIFICANT CHANGE UP (ref 3.5–5.3)
RBC # BLD: 2.74 M/UL — LOW (ref 3.8–5.2)
RBC # FLD: 14.7 % — HIGH (ref 10.3–14.5)
SODIUM SERPL-SCNC: 138 MMOL/L — SIGNIFICANT CHANGE UP (ref 135–145)
WBC # BLD: 11.37 K/UL — HIGH (ref 3.8–10.5)
WBC # FLD AUTO: 11.37 K/UL — HIGH (ref 3.8–10.5)

## 2024-11-21 PROCEDURE — 99222 1ST HOSP IP/OBS MODERATE 55: CPT

## 2024-11-21 PROCEDURE — 99291 CRITICAL CARE FIRST HOUR: CPT

## 2024-11-21 PROCEDURE — 71045 X-RAY EXAM CHEST 1 VIEW: CPT | Mod: 26

## 2024-11-21 PROCEDURE — 72128 CT CHEST SPINE W/O DYE: CPT | Mod: 26

## 2024-11-21 PROCEDURE — 72131 CT LUMBAR SPINE W/O DYE: CPT | Mod: 26

## 2024-11-21 RX ORDER — SENNOSIDES 8.6 MG
1 TABLET ORAL DAILY
Refills: 0 | Status: DISCONTINUED | OUTPATIENT
Start: 2024-11-21 | End: 2024-11-21

## 2024-11-21 RX ORDER — DEXAMETHASONE 1.5 MG/1
4 TABLET ORAL EVERY 6 HOURS
Refills: 0 | Status: COMPLETED | OUTPATIENT
Start: 2024-11-21 | End: 2024-11-22

## 2024-11-21 RX ORDER — ACETAMINOPHEN 500MG 500 MG/1
1000 TABLET, COATED ORAL ONCE
Refills: 0 | Status: COMPLETED | OUTPATIENT
Start: 2024-11-21 | End: 2024-11-21

## 2024-11-21 RX ORDER — OXYCODONE HYDROCHLORIDE 30 MG/1
5 TABLET ORAL EVERY 4 HOURS
Refills: 0 | Status: DISCONTINUED | OUTPATIENT
Start: 2024-11-21 | End: 2024-11-22

## 2024-11-21 RX ORDER — POLYETHYLENE GLYCOL 3350 17 G/17G
17 POWDER, FOR SOLUTION ORAL
Refills: 0 | Status: DISCONTINUED | OUTPATIENT
Start: 2024-11-21 | End: 2024-11-22

## 2024-11-21 RX ORDER — BENZOCAINE, MENTHOL 15; 3.6 MG/1; MG/1
1 LOZENGE ORAL ONCE
Refills: 0 | Status: DISCONTINUED | OUTPATIENT
Start: 2024-11-21 | End: 2024-11-21

## 2024-11-21 RX ORDER — OXYCODONE HYDROCHLORIDE 30 MG/1
10 TABLET ORAL EVERY 8 HOURS
Refills: 0 | Status: DISCONTINUED | OUTPATIENT
Start: 2024-11-21 | End: 2024-11-22

## 2024-11-21 RX ORDER — ENOXAPARIN SODIUM 30 MG/.3ML
40 INJECTION SUBCUTANEOUS
Refills: 0 | Status: DISCONTINUED | OUTPATIENT
Start: 2024-11-21 | End: 2024-12-02

## 2024-11-21 RX ORDER — SODIUM CHLORIDE 9 MG/ML
10 INJECTION, SOLUTION INTRAMUSCULAR; INTRAVENOUS; SUBCUTANEOUS
Refills: 0 | Status: DISCONTINUED | OUTPATIENT
Start: 2024-11-21 | End: 2024-11-22

## 2024-11-21 RX ORDER — GABAPENTIN 300 MG/1
300 CAPSULE ORAL EVERY 8 HOURS
Refills: 0 | Status: DISCONTINUED | OUTPATIENT
Start: 2024-11-21 | End: 2024-11-22

## 2024-11-21 RX ORDER — PANTOPRAZOLE SODIUM 40 MG/1
40 TABLET, DELAYED RELEASE ORAL
Refills: 0 | Status: DISCONTINUED | OUTPATIENT
Start: 2024-11-21 | End: 2024-11-22

## 2024-11-21 RX ORDER — HYDROMORPHONE HYDROCHLORIDE 2 MG/1
0.5 TABLET ORAL EVERY 6 HOURS
Refills: 0 | Status: DISCONTINUED | OUTPATIENT
Start: 2024-11-21 | End: 2024-11-22

## 2024-11-21 RX ORDER — CHLORHEXIDINE GLUCONATE 1.2 MG/ML
1 RINSE ORAL
Refills: 0 | Status: DISCONTINUED | OUTPATIENT
Start: 2024-11-21 | End: 2024-11-22

## 2024-11-21 RX ADMIN — METOPROLOL TARTRATE 75 MILLIGRAM(S): 100 TABLET, FILM COATED ORAL at 17:14

## 2024-11-21 RX ADMIN — METHOCARBAMOL 750 MILLIGRAM(S): 500 TABLET, FILM COATED ORAL at 14:32

## 2024-11-21 RX ADMIN — CHLORHEXIDINE GLUCONATE 1 APPLICATION(S): 1.2 RINSE ORAL at 05:08

## 2024-11-21 RX ADMIN — Medication 100 MILLIGRAM(S): at 14:31

## 2024-11-21 RX ADMIN — Medication 100 MILLIGRAM(S): at 05:08

## 2024-11-21 RX ADMIN — ACETAMINOPHEN 500MG 650 MILLIGRAM(S): 500 TABLET, COATED ORAL at 17:14

## 2024-11-21 RX ADMIN — POLYETHYLENE GLYCOL 3350 17 GRAM(S): 17 POWDER, FOR SOLUTION ORAL at 11:28

## 2024-11-21 RX ADMIN — NICARDIPINE HYDROCHLORIDE 25 MG/HR: 2.5 INJECTION INTRAVENOUS at 00:00

## 2024-11-21 RX ADMIN — POLYETHYLENE GLYCOL 3350 17 GRAM(S): 17 POWDER, FOR SOLUTION ORAL at 22:02

## 2024-11-21 RX ADMIN — CHLORHEXIDINE GLUCONATE 15 MILLILITER(S): 1.2 RINSE ORAL at 05:07

## 2024-11-21 RX ADMIN — PANTOPRAZOLE SODIUM 40 MILLIGRAM(S): 40 TABLET, DELAYED RELEASE ORAL at 05:15

## 2024-11-21 RX ADMIN — DEXAMETHASONE 4 MILLIGRAM(S): 1.5 TABLET ORAL at 11:27

## 2024-11-21 RX ADMIN — ENOXAPARIN SODIUM 40 MILLIGRAM(S): 30 INJECTION SUBCUTANEOUS at 17:14

## 2024-11-21 RX ADMIN — TRIAMTERENE AND HYDROCHLOROTHIAZIDE 1 TABLET(S): 25; 37.5 CAPSULE ORAL at 05:08

## 2024-11-21 RX ADMIN — ACETAMINOPHEN 500MG 650 MILLIGRAM(S): 500 TABLET, COATED ORAL at 18:14

## 2024-11-21 RX ADMIN — GABAPENTIN 300 MILLIGRAM(S): 300 CAPSULE ORAL at 22:02

## 2024-11-21 RX ADMIN — Medication 100 MILLIGRAM(S): at 22:02

## 2024-11-21 RX ADMIN — DEXAMETHASONE 4 MILLIGRAM(S): 1.5 TABLET ORAL at 23:25

## 2024-11-21 RX ADMIN — CHLORHEXIDINE GLUCONATE 1 APPLICATION(S): 1.2 RINSE ORAL at 11:28

## 2024-11-21 RX ADMIN — DEXAMETHASONE 4 MILLIGRAM(S): 1.5 TABLET ORAL at 17:13

## 2024-11-21 RX ADMIN — ROSUVASTATIN CALCIUM 10 MILLIGRAM(S): 5 TABLET, FILM COATED ORAL at 22:02

## 2024-11-21 RX ADMIN — METHOCARBAMOL 750 MILLIGRAM(S): 500 TABLET, FILM COATED ORAL at 05:08

## 2024-11-21 RX ADMIN — POTASSIUM CHLORIDE 100 MILLIEQUIVALENT(S): 600 TABLET, EXTENDED RELEASE ORAL at 00:22

## 2024-11-21 RX ADMIN — Medication 75 MILLILITER(S): at 11:29

## 2024-11-21 RX ADMIN — METOPROLOL TARTRATE 75 MILLIGRAM(S): 100 TABLET, FILM COATED ORAL at 05:08

## 2024-11-21 RX ADMIN — Medication 1 TABLET(S): at 11:28

## 2024-11-21 RX ADMIN — ACETAMINOPHEN 500MG 400 MILLIGRAM(S): 500 TABLET, COATED ORAL at 01:30

## 2024-11-21 RX ADMIN — ACETAMINOPHEN 500MG 1000 MILLIGRAM(S): 500 TABLET, COATED ORAL at 02:00

## 2024-11-21 RX ADMIN — DEXMEDETOMIDINE HYDROCHLORIDE 3.74 MICROGRAM(S)/KG/HR: 4 INJECTION, SOLUTION INTRAVENOUS at 11:31

## 2024-11-21 RX ADMIN — Medication 2 TABLET(S): at 22:02

## 2024-11-21 RX ADMIN — METHOCARBAMOL 750 MILLIGRAM(S): 500 TABLET, FILM COATED ORAL at 22:02

## 2024-11-21 RX ADMIN — GABAPENTIN 300 MILLIGRAM(S): 300 CAPSULE ORAL at 05:08

## 2024-11-21 NOTE — DIETITIAN INITIAL EVALUATION ADULT - PERTINENT LABORATORY DATA
11-20    141  |  102  |  19  ----------------------------<  162[H]  3.8   |  22  |  0.74    Ca    8.4      20 Nov 2024 20:47  Phos  4.0     11-20  Mg     2.0     11-20    POCT Blood Glucose.: 154 mg/dL (11-21-24 @ 12:00)  A1C with Estimated Average Glucose Result: 5.4 % (11-01-24 @ 09:50)

## 2024-11-21 NOTE — PROGRESS NOTE ADULT - ATTENDING COMMENTS
a.fib, eliquis held.  no cuff leak this AM, was long case with prone positioning, will reassess this afternoon.  q2h neuro checks.  pending post-op CT.  no MAP goals.

## 2024-11-21 NOTE — DIETITIAN INITIAL EVALUATION ADULT - ORAL INTAKE PTA/DIET HISTORY
Pt was eating well with no changes in appetite. Pt follows low Na diet. Denies oral nutrient supplement use; took multivitamin, omega-3, and eye vitamin. Denies Hx of chewing or swallowing issues. Confirms no known food allergies.

## 2024-11-21 NOTE — PROGRESS NOTE ADULT - ASSESSMENT
ASSESSMENT/PLAN:    NEURO:  -POD0 T11-pelvis fusion, L4-5, L5-S1 TLIFs, T12-L1, L1-2, L3-4 PCOs.   -CT T/L-spine pending.  -TLSO brace for OOB.  -2 deep HMVs, 1 superficial HMV, Prevena per NSGY.  -Standing scoli XR films pending.   Activity: [] mobilize as tolerated [] Bedrest [] PT [] OT [] PMNR      CV:  SBP goal 100-160.    PULM:  Wean to extubate.  Racemic epi given absence of cuff leak. no steroids per neurosurgery     RENAL:  Fluids: IV LR at 75cc/hr    GI:  Diet: NPO for possible extubation in AM  GI prophylaxis [] not indicated [X] PPI [] other:  Bowel regimen [] colace [x] senna [] other: Miralax    ENDO:   Goal euglycemia (-180)    HEME/ONC:  VTE prophylaxis: SCDs  chemoppx tongith     ID:  Ancef while HMV drains in.    CODE STATUS:  [X] Full Code [] DNR [] DNI [] Palliative/Comfort Care    DISPOSITION:  [X] ICU [] Stroke Unit [] Floor [] EMU [] RCU [] PCU   ASSESSMENT/PLAN:    NEURO:  -POD2 T11-pelvis fusion, L4-5, L5-S1 TLIFs, T12-L1, L1-2, L3-4 PCOs.   -11/21 CT T/L-spine fu official   -TLSO brace for OOB.  -2 deep HMVs, 1 superficial HMV, Prevena per NSGY.  -Standing scoli XR films pending.   -Pain control: PCA dc'ed on 11/21, continue oxy 5 q 4, oxy 10 q4, IV Dilaudid 0.5 q6, gabapentin 300 TID, Robaxin 750 q TID as per pain control  Activity: [] mobilize as tolerated [] Bedrest [X] PT [X] OT [] PMNR      CV:  SBP goal 100-160  off cardene gtt  Restarted maxzide during the day, continue metoprolol  Continue Crestor 10 mg home med  EKG    PULM:  Extubated on 11/21  continue dex 4 q6 for airway edema   received 1 dose of racemic epi in 11/20 11/21 CXR clear lungs     RENAL:  LR @ 75  Monitor IOs  Replete lytes PRN  Mendez    GI:  Diet: NPO pending official SLP recs  GI prophylaxis [] not indicated [X] PPI [] other:  Bowel regimen [] colace [x] senna [X] other: Miralax  LBM: PTA    ENDO:   Goal euglycemia (-180)    HEME/ONC:  Rpt CBC tonight   VTE prophylaxis: SCDs  chemo-ppx tonight     ID:  afebrile   Ancef while HMV drains in.    CODE STATUS:  [X] Full Code [] DNR [] DNI [] Palliative/Comfort Care    DISPOSITION:  [X] ICU [] Stroke Unit [] Floor [] EMU [] RCU [] PCU

## 2024-11-21 NOTE — PHYSICAL THERAPY INITIAL EVALUATION ADULT - BALANCE DISTURBANCE, IDENTIFIED IMPAIRMENT CONTRIBUTE, REHAB EVAL
C/o pain not relieved by Tylenol & nausea.   - will start PRN percoct, PRN IV Morphone   - PRN IV Zofran (QTc prologed but has PCM/AICD) but prefer Cyproheptadine po if possible as it doesn't cause much QTc prolongation   - will check one EKG for QTc    decreased strength

## 2024-11-21 NOTE — AIRWAY REMOVAL NOTE  ADULT & PEDS - ARTIFICAL AIRWAY REMOVAL COMMENTS
Written order for extubation verified. The patient was identified by full name and birth date compared to the identification band. Present during the procedure was DIOGENES Bragg.

## 2024-11-21 NOTE — DIETITIAN INITIAL EVALUATION ADULT - PERTINENT MEDS FT
MEDICATIONS  (STANDING):  acetaminophen   IVPB .. 1000 milliGRAM(s) IV Intermittent once  ceFAZolin   IVPB 2000 milliGRAM(s) IV Intermittent every 8 hours  chlorhexidine 0.12% Liquid 15 milliLiter(s) Oral Mucosa every 12 hours  chlorhexidine 4% Liquid 1 Application(s) Topical daily  chlorhexidine 4% Liquid 1 Application(s) Topical <User Schedule>  dexAMETHasone  Injectable 4 milliGRAM(s) IV Push every 6 hours  dexMEDEtomidine Infusion 0.2 MICROgram(s)/kG/Hr (3.74 mL/Hr) IV Continuous <Continuous>  enoxaparin Injectable 40 milliGRAM(s) SubCutaneous <User Schedule>  gabapentin 300 milliGRAM(s) Oral two times a day  lactated ringers. 1000 milliLiter(s) (75 mL/Hr) IV Continuous <Continuous>  methocarbamol 750 milliGRAM(s) Oral every 8 hours  metoprolol tartrate 75 milliGRAM(s) Oral two times a day  niCARdipine Infusion 5 mG/Hr (25 mL/Hr) IV Continuous <Continuous>  pantoprazole    Tablet 40 milliGRAM(s) Oral before breakfast  polyethylene glycol 3350 17 Gram(s) Oral daily  rosuvastatin 10 milliGRAM(s) Oral at bedtime  senna 2 Tablet(s) Oral at bedtime  senna 1 Tablet(s) Oral daily  triamterene 37.5 mG/hydrochlorothiazide 25 mG Tablet 1 Tablet(s) Oral daily    MEDICATIONS  (PRN):  acetaminophen     Tablet .. 650 milliGRAM(s) Oral every 6 hours PRN Mild Pain (1 - 3)  ondansetron Injectable 4 milliGRAM(s) IV Push once PRN Nausea and/or Vomiting  oxyCODONE    IR 5 milliGRAM(s) Oral every 4 hours PRN Severe Pain (7 - 10)  sodium chloride 0.9% lock flush 10 milliLiter(s) IV Push every 1 hour PRN Pre/post blood products, medications, blood draw, and to maintain line patency

## 2024-11-21 NOTE — DIETITIAN INITIAL EVALUATION ADULT - OTHER CALCULATIONS
Fluid needs deferred to provider. The [place] State Equation (PSU) 2003b was used to calculate resting energy expenditure: 1210 kcals

## 2024-11-21 NOTE — OCCUPATIONAL THERAPY INITIAL EVALUATION ADULT - ADDITIONAL COMMENTS
pt reports lives in private home with daughter, 2 steps to enter, 1 level inside. Prior to admission Ind with ADLs/ambulating, no AD/DME. Daughter's able to assist upon d/c.

## 2024-11-21 NOTE — OCCUPATIONAL THERAPY INITIAL EVALUATION ADULT - GENERAL OBSERVATIONS, REHAB EVAL
pt received semisupine in bed +ICU monitoring, IVs, hemovacx3, prevena+, NG, aerosol mask, TLSO donned pt received semisupine in bed +ICU monitoring, a-line, IVs, hemovacx3, prevena+, NG, aerosol mask, TLSO donned

## 2024-11-21 NOTE — DIETITIAN INITIAL EVALUATION ADULT - ENTERAL
Glucerna 1.5 at 10 mL/hr increasing only as tolerated and electrolytes WNL to goal rate 50 mL/hr x18 hours to provide total volume 900 mL, 1350 kcals, 74 gm protein, and 683 mL free water. Meets 18 kcals/kG and ~1.0 gm protein/kG based on 74.8 kG.  If starting on enteral, Glucerna 1.5 at 10 mL/hr increasing only as tolerated and electrolytes WNL to goal rate 50 mL/hr x18 hours to provide total volume 900 mL, 1350 kcals, 74 gm protein, and 683 mL free water. Meets 18 kcals/kG and ~1.0 gm protein/kG based on 74.8 kG.

## 2024-11-21 NOTE — PROGRESS NOTE ADULT - SUBJECTIVE AND OBJECTIVE BOX
NSICU PROGRESS NOTE     HPI  79F, admitted for T11-pelvis fusion to address LBP and coronal scoliosis. PMH AFib/flutter (Eliquis stopped for OR), HTN/HLD.     ICU COURSE:  11/20: admitted to NSCU s/p OR  11/21 extubated, continue with dex 4 q6 for airway edema,  PCA discontinued, TSLO brace ordered     EXAMINATION:  General: No acute distress  HEENT: Anicteric sclerae  Cardiac: [id number]  Lungs: Clear  Abdomen: Soft, non-tender, +BS  Extremities: No c/c/e  Skin/Incision Site: Clean, dry and intact  Neurologic: intubated, FC, LLE 5/5, RHF 4-/5 (pain-limited), RKE 4/5, RDF 4/5, RPF 5/5.    VITALS:   ICU Vital Signs Last 24 Hrs  T(C): 36.5 (21 Nov 2024 16:00), Max: 47 (21 Nov 2024 12:00)  T(F): 97.7 (21 Nov 2024 16:00), Max: 116.6 (21 Nov 2024 12:00)  HR: 61 (21 Nov 2024 18:00) (60 - 92)  BP: --  BP(mean): --  ABP: 144/51 (21 Nov 2024 18:00) (97/45 - 192/78)  ABP(mean): 81 (21 Nov 2024 18:00) (65 - 125)  RR: 27 (21 Nov 2024 18:00) (10 - 31)  SpO2: 100% (21 Nov 2024 18:00) (94% - 100%)    O2 Parameters below as of 21 Nov 2024 16:25  Patient On (Oxygen Delivery Method): mask, aerosol      O2 Concentration (%): 50    Drips: on cardene gtt    Respiratory:  on venturi mask     ABG - ( 20 Nov 2024 17:15 )  pH, Arterial: 7.36  pH, Blood: x     /  pCO2: 45    /  pO2: 187   / HCO3: 25    / Base Excess: -0.3  /  SaO2: 99.4                LABS:                          9.4    8.61  )-----------( 176      ( 20 Nov 2024 20:47 )             28.2     11-20    141  |  102  |  19  ----------------------------<  162[H]  3.8   |  22  |  0.74    Ca    8.4      20 Nov 2024 20:47  Phos  4.0     11-20  Mg     2.0     11-20      I&O's Summary    20 Nov 2024 07:01  -  21 Nov 2024 07:00  --------------------------------------------------------  IN: 1474.4 mL / OUT: 1110 mL / NET: 364.4 mL    21 Nov 2024 07:01  -  21 Nov 2024 18:57  --------------------------------------------------------  IN: 1061.1 mL / OUT: 1150 mL / NET: -88.9 mL      Imaging   CXR < from: Xray Chest 1 View- PORTABLE-Routine (Xray Chest 1 View- PORTABLE-Routine in AM.) (11.21.24 @ 03:22) >  FINDINGS:    11/20/2024 11:08 PM:    Enteric tube coursesbelow the diaphragm with tip in the stomach.  Endotracheal tube in place with tip in the mid trachea above the hilda.  Left chest wall cardiac device with leads in place.  Right-sided central venous catheter with tip in place.  Thoracic spine orthopedic hardware.    The heart is normal in size.  No focal consolidations.  Trace bilateral pleural effusions.  There is no pneumothorax effusion.    11/21/2024 1:26 AM:    No significant interval changes.    IMPRESSION:  Lines and tubes as above.    < end of copied text >      EKG     MEDICATION LEVELS:     IVF FLUIDS/MEDICATIONS:   MEDICATIONS  (STANDING):  ceFAZolin   IVPB 2000 milliGRAM(s) IV Intermittent every 8 hours  chlorhexidine 4% Liquid 1 Application(s) Topical daily  chlorhexidine 4% Liquid 1 Application(s) Topical <User Schedule>  dexAMETHasone  Injectable 4 milliGRAM(s) IV Push every 6 hours  enoxaparin Injectable 40 milliGRAM(s) SubCutaneous <User Schedule>  gabapentin 300 milliGRAM(s) Oral every 8 hours  lactated ringers. 1000 milliLiter(s) (75 mL/Hr) IV Continuous <Continuous>  methocarbamol 750 milliGRAM(s) Oral every 8 hours  metoprolol tartrate 75 milliGRAM(s) Oral two times a day  niCARdipine Infusion 5 mG/Hr (25 mL/Hr) IV Continuous <Continuous>  pantoprazole    Tablet 40 milliGRAM(s) Oral before breakfast  polyethylene glycol 3350 17 Gram(s) Oral daily  rosuvastatin 10 milliGRAM(s) Oral at bedtime  senna 2 Tablet(s) Oral at bedtime  senna 1 Tablet(s) Oral daily  triamterene 37.5 mG/hydrochlorothiazide 25 mG Tablet 1 Tablet(s) Oral daily    MEDICATIONS  (PRN):  acetaminophen     Tablet .. 650 milliGRAM(s) Oral every 6 hours PRN Mild Pain (1 - 3)  HYDROmorphone  Injectable 0.5 milliGRAM(s) IV Push every 6 hours PRN breakthrough pain  ondansetron Injectable 4 milliGRAM(s) IV Push once PRN Nausea and/or Vomiting  oxyCODONE    IR 5 milliGRAM(s) Oral every 4 hours PRN Moderate Pain (4 - 6)  oxyCODONE    IR 10 milliGRAM(s) Oral every 8 hours PRN Severe Pain (7 - 10)  sodium chloride 0.9% lock flush 10 milliLiter(s) IV Push every 1 hour PRN Pre/post blood products, medications, blood draw, and to maintain line patency       NSICU PROGRESS NOTE     HPI  79F, admitted for T11-pelvis fusion to address LBP and coronal scoliosis. PMH AFib/flutter (Eliquis stopped for OR), HTN/HLD.     ICU COURSE:  11/20: admitted to NSCU s/p OR  11/21 extubated, continue with dex 4 q6 for airway edema,  PCA discontinued, TSLO brace ordered     EXAMINATION:  General: No acute distress  HEENT: Anicteric sclerae  Cardiac: [id number]  Lungs: Clear  Abdomen: Soft, non-tender, +BS  Extremities: No c/c/e  Skin/Incision Site: Clean, dry and intact  Neurologic: intubated, FC, LLE 5/5, RHF 4-/5 (pain-limited), RKE 4/5, RDF 4/5, RPF 5/5.    VITALS:   ICU Vital Signs Last 24 Hrs  T(C): 36.5 (21 Nov 2024 16:00), Max: 47 (21 Nov 2024 12:00)  T(F): 97.7 (21 Nov 2024 16:00), Max: 116.6 (21 Nov 2024 12:00)  HR: 61 (21 Nov 2024 18:00) (60 - 92)  BP: --  BP(mean): --  ABP: 144/51 (21 Nov 2024 18:00) (97/45 - 192/78)  ABP(mean): 81 (21 Nov 2024 18:00) (65 - 125)  RR: 27 (21 Nov 2024 18:00) (10 - 31)  SpO2: 100% (21 Nov 2024 18:00) (94% - 100%)    O2 Parameters below as of 21 Nov 2024 16:25  Patient On (Oxygen Delivery Method): mask, aerosol      O2 Concentration (%): 50    Respiratory:  on RA    ABG - ( 20 Nov 2024 17:15 )  pH, Arterial: 7.36  pH, Blood: x     /  pCO2: 45    /  pO2: 187   / HCO3: 25    / Base Excess: -0.3  /  SaO2: 99.4            LABS:                          9.4    8.61  )-----------( 176      ( 20 Nov 2024 20:47 )             28.2     11-20    141  |  102  |  19  ----------------------------<  162[H]  3.8   |  22  |  0.74    Ca    8.4      20 Nov 2024 20:47  Phos  4.0     11-20  Mg     2.0     11-20      I&O's Summary    20 Nov 2024 07:01  -  21 Nov 2024 07:00  --------------------------------------------------------  IN: 1474.4 mL / OUT: 1110 mL / NET: 364.4 mL    21 Nov 2024 07:01  -  21 Nov 2024 18:57  --------------------------------------------------------  IN: 1061.1 mL / OUT: 1150 mL / NET: -88.9 mL      Imaging   CXR < from: Xray Chest 1 View- PORTABLE-Routine (Xray Chest 1 View- PORTABLE-Routine in AM.) (11.21.24 @ 03:22) >  FINDINGS:    11/20/2024 11:08 PM:    Enteric tube coursesbelow the diaphragm with tip in the stomach.  Endotracheal tube in place with tip in the mid trachea above the hilda.  Left chest wall cardiac device with leads in place.  Right-sided central venous catheter with tip in place.  Thoracic spine orthopedic hardware.    The heart is normal in size.  No focal consolidations.  Trace bilateral pleural effusions.  There is no pneumothorax effusion.    11/21/2024 1:26 AM:    No significant interval changes.    IMPRESSION:  Lines and tubes as above.    < end of copied text >      EKG     MEDICATION LEVELS:     IVF FLUIDS/MEDICATIONS:   MEDICATIONS  (STANDING):  ceFAZolin   IVPB 2000 milliGRAM(s) IV Intermittent every 8 hours  chlorhexidine 4% Liquid 1 Application(s) Topical daily  chlorhexidine 4% Liquid 1 Application(s) Topical <User Schedule>  dexAMETHasone  Injectable 4 milliGRAM(s) IV Push every 6 hours  enoxaparin Injectable 40 milliGRAM(s) SubCutaneous <User Schedule>  gabapentin 300 milliGRAM(s) Oral every 8 hours  lactated ringers. 1000 milliLiter(s) (75 mL/Hr) IV Continuous <Continuous>  methocarbamol 750 milliGRAM(s) Oral every 8 hours  metoprolol tartrate 75 milliGRAM(s) Oral two times a day  niCARdipine Infusion 5 mG/Hr (25 mL/Hr) IV Continuous <Continuous>  pantoprazole    Tablet 40 milliGRAM(s) Oral before breakfast  polyethylene glycol 3350 17 Gram(s) Oral daily  rosuvastatin 10 milliGRAM(s) Oral at bedtime  senna 2 Tablet(s) Oral at bedtime  senna 1 Tablet(s) Oral daily  triamterene 37.5 mG/hydrochlorothiazide 25 mG Tablet 1 Tablet(s) Oral daily    MEDICATIONS  (PRN):  acetaminophen     Tablet .. 650 milliGRAM(s) Oral every 6 hours PRN Mild Pain (1 - 3)  HYDROmorphone  Injectable 0.5 milliGRAM(s) IV Push every 6 hours PRN breakthrough pain  ondansetron Injectable 4 milliGRAM(s) IV Push once PRN Nausea and/or Vomiting  oxyCODONE    IR 5 milliGRAM(s) Oral every 4 hours PRN Moderate Pain (4 - 6)  oxyCODONE    IR 10 milliGRAM(s) Oral every 8 hours PRN Severe Pain (7 - 10)  sodium chloride 0.9% lock flush 10 milliLiter(s) IV Push every 1 hour PRN Pre/post blood products, medications, blood draw, and to maintain line patency

## 2024-11-21 NOTE — PHYSICAL THERAPY INITIAL EVALUATION ADULT - DID THE PATIENT HAVE SURGERY?
T11-pelvis instrumentation and fusion; L4-L5, L5-S1 TLIFs, T12-L1, L1-L2, L2-L3, L3-L4 posterior column osteotomies for coronal scoliosis deformity correction 11/20/24./yes

## 2024-11-21 NOTE — PHYSICAL THERAPY INITIAL EVALUATION ADULT - ADDITIONAL COMMENTS
Pt resides in a pvt home w/ daughter, 2 steps to enter, no additional steps inside. PTA pt was independent with all mobility & ADL's. Did not use an AD for ambulation.

## 2024-11-21 NOTE — OCCUPATIONAL THERAPY INITIAL EVALUATION ADULT - PERTINENT HX OF CURRENT PROBLEM, REHAB EVAL
79 year  old RHD  female  with  c/o non radiating lower back pain for  many years, pain become more  troublesome for last 4 yrs  with  severe pain and lower extremity weakness of which sometimes worse in right side is worse . She tried all pain management modalities like NSAIDS, , physical therapy, epidural  injects and facet blocks with no relief at all".   Pt states she is having scoliosis which made symptoms worse with few  episodes of right side sciatic pain. Her pain increases with ambulation and movement and decreases with sitting. Worse pain is standing still. No recent trauma. S/p surgical consult & scheduled for T11-Pelvis posterior stabilization and fusion on 11/19/2024. now s/p T11-pelvis instrumentation and fusion; L4-L5, L5-S1 TLIFs, T12-L1, L1-L2, L2-L3, L3-L4 posterior column osteotomies for coronal scoliosis deformity correction 11/20

## 2024-11-21 NOTE — PROGRESS NOTE ADULT - SUBJECTIVE AND OBJECTIVE BOX
NSICU PROGRESS NOTE     HPI  79F, admitted for T11-pelvis fusion to address LBP and coronal scoliosis. PMH AFib/flutter (Eliquis stopped for OR), HTN/HLD.     ICU COURSE:  11/20: admitted to NSCU s/p OR    EXAMINATION:  General: No acute distress  HEENT: Anicteric sclerae  Cardiac: [id number]  Lungs: Clear  Abdomen: Soft, non-tender, +BS  Extremities: No c/c/e  Skin/Incision Site: Clean, dry and intact  Neurologic: intubated, FC, LLE 5/5, RHF 4-/5 (pain-limited), RKE 4/5, RDF 4/5, RPF 5/5.    VITALS:   Vital Signs Last 24 Hrs  T(C): 36.7 (21 Nov 2024 03:00), Max: 36.7 (20 Nov 2024 20:15)  T(F): 98 (21 Nov 2024 03:00), Max: 98.1 (20 Nov 2024 20:15)  HR: 60 (21 Nov 2024 06:45) (60 - 92)  BP: --  BP(mean): --  RR: 12 (21 Nov 2024 06:45) (12 - 22)  SpO2: 100% (21 Nov 2024 06:45) (100% - 100%)    Parameters below as of 20 Nov 2024 20:15  Patient On (Oxygen Delivery Method): ventilator    O2 Concentration (%): 50    Drips     Respiratory:  Mode: AC/ CMV (Assist Control/ Continuous Mandatory Ventilation)  RR (machine): 16  TV (machine): 480  FiO2: 50  PEEP: 5  ITime: 1  MAP: 8  PIP: 18    ABG - ( 20 Nov 2024 17:15 )  pH, Arterial: 7.36  pH, Blood: x     /  pCO2: 45    /  pO2: 187   / HCO3: 25    / Base Excess: -0.3  /  SaO2: 99.4                LABS:                        9.4    8.61  )-----------( 176      ( 20 Nov 2024 20:47 )             28.2     11-20    141  |  102  |  19  ----------------------------<  162[H]  3.8   |  22  |  0.74      CAPILLARY BLOOD GLUCOSE          I&O's Summary    20 Nov 2024 07:01  -  21 Nov 2024 07:00  --------------------------------------------------------  IN: 1474.4 mL / OUT: 1110 mL / NET: 364.4 mL        Imaging   CXR     EKG     MEDICATION LEVELS:     IVF FLUIDS/MEDICATIONS:   MEDICATIONS  (STANDING):  ceFAZolin   IVPB 2000 milliGRAM(s) IV Intermittent every 8 hours  chlorhexidine 0.12% Liquid 15 milliLiter(s) Oral Mucosa every 12 hours  chlorhexidine 4% Liquid 1 Application(s) Topical daily  chlorhexidine 4% Liquid 1 Application(s) Topical <User Schedule>  dexMEDEtomidine Infusion 0.2 MICROgram(s)/kG/Hr (3.74 mL/Hr) IV Continuous <Continuous>  gabapentin 300 milliGRAM(s) Oral two times a day  HYDROmorphone PCA (1 mG/mL) 30 milliLiter(s) PCA Continuous PCA Continuous  lactated ringers. 1000 milliLiter(s) (75 mL/Hr) IV Continuous <Continuous>  methocarbamol 750 milliGRAM(s) Oral every 8 hours  metoprolol tartrate 75 milliGRAM(s) Oral two times a day  niCARdipine Infusion 5 mG/Hr (25 mL/Hr) IV Continuous <Continuous>  pantoprazole    Tablet 40 milliGRAM(s) Oral before breakfast  polyethylene glycol 3350 17 Gram(s) Oral daily  rosuvastatin 10 milliGRAM(s) Oral at bedtime  senna 2 Tablet(s) Oral at bedtime  triamterene 37.5 mG/hydrochlorothiazide 25 mG Tablet 1 Tablet(s) Oral daily    MEDICATIONS  (PRN):  acetaminophen     Tablet .. 650 milliGRAM(s) Oral every 6 hours PRN Mild Pain (1 - 3)  naloxone Injectable 0.1 milliGRAM(s) IV Push every 3 minutes PRN For ANY of the following changes in patient status:  A. RR LESS THAN 10 breaths per minute, B. Oxygen saturation LESS THAN 90%, C. Sedation score of 6  ondansetron Injectable 4 milliGRAM(s) IV Push once PRN Nausea and/or Vomiting  oxyCODONE    IR 5 milliGRAM(s) Oral every 4 hours PRN Severe Pain (7 - 10)  sodium chloride 0.9% lock flush 10 milliLiter(s) IV Push every 1 hour PRN Pre/post blood products, medications, blood draw, and to maintain line patency

## 2024-11-21 NOTE — PHYSICAL THERAPY INITIAL EVALUATION ADULT - GENERAL OBSERVATIONS, REHAB EVAL
Rec'd semi-supine in bed, +ICU monitoring, +emmanuel, +HMV x3, +prevena vac, +IV drips, +a-line, +NGT (capped), +aerosol mask

## 2024-11-21 NOTE — PROGRESS NOTE ADULT - SUBJECTIVE AND OBJECTIVE BOX
Patient seen and examined at bedside.    --Anticoagulation--    T(C): 36.7 (11-21-24 @ 03:00), Max: 36.7 (11-20-24 @ 20:15)  HR: 60 (11-21-24 @ 05:30) (60 - 92)  BP: 154/76 (11-20-24 @ 06:51) (154/76 - 154/76)  RR: 16 (11-21-24 @ 05:30) (16 - 22)  SpO2: 100% (11-21-24 @ 05:30) (95% - 100%)  Wt(kg): --    Exam: Intubated, Ox3, PERRL, EOMI, BUE 5/5, LLE 5/5, RLE HF 4-/5, KE 4/5, DF 4/5, PF 5/5 (pain-limited)
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
## 2024-11-21 NOTE — DIETITIAN INITIAL EVALUATION ADULT - OTHER INFO
Wt Hx:   Dosing wt 74.8 kG/164.9 lbs.    UBW ~160 lbs with no changes in wt PTA  Ht: 64 inches   IBW: 120 lbs     IBW%: 138%  Wt Hx per HIE (lbs): 160 (2/29/24), 164 (10/8/24), 164 (11/1/24)    Nutrition-Related Concerns:   - Intubated for procedure 11/20  - On steroid which can elevate BG

## 2024-11-21 NOTE — DIETITIAN INITIAL EVALUATION ADULT - NSFNSGIIOFT_GEN_A_CORE
21 Nov 2024 07:01  -  21 Nov 2024 15:00  --------------------------------------------------------  IN:    Dexmedetomidine: 74.9 mL    IV PiggyBack: 50 mL    Lactated Ringers: 600 mL    NiCARdipine: 25 mL  Total IN: 749.9 mL    OUT:    Indwelling Catheter - Urethral (mL): 665 mL  Total OUT: 665 mL    Total NET: 84.9 mL

## 2024-11-21 NOTE — DIETITIAN INITIAL EVALUATION ADULT - NSFNSPHYEXAMSKINFT_GEN_A_CORE
Pressure Injury 1: anterior, chest, Suspected deep tissue injury  Pressure Injury 2: Right:, anterior, pelvis, Stage II  Pressure Injury 3: Left:, anterior, pelvis, Stage II

## 2024-11-21 NOTE — PROGRESS NOTE ADULT - ASSESSMENT
TLSO brace when OOB     CT T/L spine w/o in AM    Wean to extubate in AM    CPAP as tolerated     Standing scoli series XR when tolerates     Connect PCA pump when able

## 2024-11-21 NOTE — PHYSICAL THERAPY INITIAL EVALUATION ADULT - PERTINENT HX OF CURRENT PROBLEM, REHAB EVAL
79 year  old RHD  female  with  c/o non radiating lower back pain for  many years, pain become more  troublesome for last 4 yrs  with  severe pain and lower extremity weakness of which sometimes worse in right side is worse . She tried all pain management modalities like NSAIDS, , physical therapy, epidural  injects and facet blocks with no relief at all".   Pt states she is having scoliosis which made symptoms worse with few  episodes of right side sciatic pain. Her pain increases with ambulation and movement and decreases with sitting. Worse pain is standing still. No recent trauma. S/p surgical consult & scheduled for T11-Pelvis posterior stabilization and fusion on 11/19/2024. now s/p T11-pelvis instrumentation and fusion; L4-L5, L5-S1 TLIFs, T12-L1, L1-L2, L2-L3, L3-L4 posterior column osteotomies for coronal scoliosis deformity correction 11/20/24.

## 2024-11-21 NOTE — PROGRESS NOTE ADULT - ASSESSMENT
ASSESSMENT/PLAN:    NEURO:  -POD0 T11-pelvis fusion, L4-5, L5-S1 TLIFs, T12-L1, L1-2, L3-4 PCOs.   -CT T/L-spine pending.  -TLSO brace for OOB.  -2 deep HMVs, 1 superficial HMV, Prevena per NSGY.  -Standing scoli XR films pending.   Activity: [] mobilize as tolerated [] Bedrest [] PT [] OT [] PMNR      CV:  SBP goal 100-160.    PULM:  Wean to extubate.  Racemic epi given absence of cuff leak.      RENAL:  Fluids: IV LR at 75cc/hr    GI:  Diet: NPO for possible extubation in AM  GI prophylaxis [] not indicated [X] PPI [] other:  Bowel regimen [] colace [] senna [] other: Miralax    ENDO:   Goal euglycemia (-180)    HEME/ONC:  VTE prophylaxis: SCDs  Received 3u pRBC, 3u FFP, and 2u plt intraop.    ID:  Ancef while HMV drains in.      CODE STATUS:  [X] Full Code [] DNR [] DNI [] Palliative/Comfort Care    DISPOSITION:  [X] ICU [] Stroke Unit [] Floor [] EMU [] RCU [] PCU   ASSESSMENT/PLAN:    NEURO:  -POD0 T11-pelvis fusion, L4-5, L5-S1 TLIFs, T12-L1, L1-2, L3-4 PCOs.   -CT T/L-spine pending.  -TLSO brace for OOB.  -2 deep HMVs, 1 superficial HMV, Prevena per NSGY.  -Standing scoli XR films pending.   Activity: [] mobilize as tolerated [] Bedrest [] PT [] OT [] PMNR      CV:  SBP goal 100-160.    PULM:  Wean to extubate.  Racemic epi given absence of cuff leak. no steroids per neurosurgery     RENAL:  Fluids: IV LR at 75cc/hr    GI:  Diet: NPO for possible extubation in AM  GI prophylaxis [] not indicated [X] PPI [] other:  Bowel regimen [] colace [x] senna [] other: Miralax    ENDO:   Goal euglycemia (-180)    HEME/ONC:  VTE prophylaxis: SCDs  chemoppx tongith     ID:  Ancef while HMV drains in.    CODE STATUS:  [X] Full Code [] DNR [] DNI [] Palliative/Comfort Care    DISPOSITION:  [X] ICU [] Stroke Unit [] Floor [] EMU [] RCU [] PCU

## 2024-11-22 ENCOUNTER — RESULT REVIEW (OUTPATIENT)
Age: 79
End: 2024-11-22

## 2024-11-22 LAB
ANION GAP SERPL CALC-SCNC: 9 MMOL/L — SIGNIFICANT CHANGE UP (ref 5–17)
BASOPHILS # BLD AUTO: 0 K/UL — SIGNIFICANT CHANGE UP (ref 0–0.2)
BASOPHILS NFR BLD AUTO: 0 % — SIGNIFICANT CHANGE UP (ref 0–2)
BLD GP AB SCN SERPL QL: NEGATIVE — SIGNIFICANT CHANGE UP
BUN SERPL-MCNC: 20 MG/DL — SIGNIFICANT CHANGE UP (ref 7–23)
CALCIUM SERPL-MCNC: 8.9 MG/DL — SIGNIFICANT CHANGE UP (ref 8.4–10.5)
CHLORIDE SERPL-SCNC: 103 MMOL/L — SIGNIFICANT CHANGE UP (ref 96–108)
CO2 SERPL-SCNC: 26 MMOL/L — SIGNIFICANT CHANGE UP (ref 22–31)
CREAT SERPL-MCNC: 0.67 MG/DL — SIGNIFICANT CHANGE UP (ref 0.5–1.3)
EGFR: 89 ML/MIN/1.73M2 — SIGNIFICANT CHANGE UP
EOSINOPHIL # BLD AUTO: 0 K/UL — SIGNIFICANT CHANGE UP (ref 0–0.5)
EOSINOPHIL NFR BLD AUTO: 0 % — SIGNIFICANT CHANGE UP (ref 0–6)
GLUCOSE SERPL-MCNC: 151 MG/DL — HIGH (ref 70–99)
HCT VFR BLD CALC: 19.2 % — CRITICAL LOW (ref 34.5–45)
HCT VFR BLD CALC: 32.5 % — LOW (ref 34.5–45)
HGB BLD-MCNC: 10.9 G/DL — LOW (ref 11.5–15.5)
HGB BLD-MCNC: 6.3 G/DL — CRITICAL LOW (ref 11.5–15.5)
LYMPHOCYTES # BLD AUTO: 0.36 K/UL — LOW (ref 1–3.3)
LYMPHOCYTES # BLD AUTO: 3.7 % — LOW (ref 13–44)
MAGNESIUM SERPL-MCNC: 2.4 MG/DL — SIGNIFICANT CHANGE UP (ref 1.6–2.6)
MANUAL SMEAR VERIFICATION: SIGNIFICANT CHANGE UP
MCHC RBC-ENTMCNC: 28.5 PG — SIGNIFICANT CHANGE UP (ref 27–34)
MCHC RBC-ENTMCNC: 29.9 PG — SIGNIFICANT CHANGE UP (ref 27–34)
MCHC RBC-ENTMCNC: 32.8 G/DL — SIGNIFICANT CHANGE UP (ref 32–36)
MCHC RBC-ENTMCNC: 33.5 G/DL — SIGNIFICANT CHANGE UP (ref 32–36)
MCV RBC AUTO: 84.9 FL — SIGNIFICANT CHANGE UP (ref 80–100)
MCV RBC AUTO: 91 FL — SIGNIFICANT CHANGE UP (ref 80–100)
MONOCYTES # BLD AUTO: 0.64 K/UL — SIGNIFICANT CHANGE UP (ref 0–0.9)
MONOCYTES NFR BLD AUTO: 6.5 % — SIGNIFICANT CHANGE UP (ref 2–14)
NEUTROPHILS # BLD AUTO: 8.85 K/UL — HIGH (ref 1.8–7.4)
NEUTROPHILS NFR BLD AUTO: 89.8 % — HIGH (ref 43–77)
NRBC # BLD: 0 /100 WBCS — SIGNIFICANT CHANGE UP (ref 0–0)
PHOSPHATE SERPL-MCNC: 1.6 MG/DL — LOW (ref 2.5–4.5)
PLAT MORPH BLD: NORMAL — SIGNIFICANT CHANGE UP
PLATELET # BLD AUTO: 131 K/UL — LOW (ref 150–400)
PLATELET # BLD AUTO: 181 K/UL — SIGNIFICANT CHANGE UP (ref 150–400)
POLYCHROMASIA BLD QL SMEAR: SLIGHT — SIGNIFICANT CHANGE UP
POTASSIUM SERPL-MCNC: 4.5 MMOL/L — SIGNIFICANT CHANGE UP (ref 3.5–5.3)
POTASSIUM SERPL-SCNC: 4.5 MMOL/L — SIGNIFICANT CHANGE UP (ref 3.5–5.3)
RBC # BLD: 2.11 M/UL — LOW (ref 3.8–5.2)
RBC # BLD: 3.83 M/UL — SIGNIFICANT CHANGE UP (ref 3.8–5.2)
RBC # FLD: 15 % — HIGH (ref 10.3–14.5)
RBC # FLD: 17.3 % — HIGH (ref 10.3–14.5)
RBC BLD AUTO: SIGNIFICANT CHANGE UP
RH IG SCN BLD-IMP: POSITIVE — SIGNIFICANT CHANGE UP
SODIUM SERPL-SCNC: 138 MMOL/L — SIGNIFICANT CHANGE UP (ref 135–145)
WBC # BLD: 15.57 K/UL — HIGH (ref 3.8–10.5)
WBC # BLD: 9.86 K/UL — SIGNIFICANT CHANGE UP (ref 3.8–10.5)
WBC # FLD AUTO: 15.57 K/UL — HIGH (ref 3.8–10.5)
WBC # FLD AUTO: 9.86 K/UL — SIGNIFICANT CHANGE UP (ref 3.8–10.5)

## 2024-11-22 PROCEDURE — 93970 EXTREMITY STUDY: CPT | Mod: 26

## 2024-11-22 PROCEDURE — 99222 1ST HOSP IP/OBS MODERATE 55: CPT

## 2024-11-22 PROCEDURE — 93306 TTE W/DOPPLER COMPLETE: CPT | Mod: 26

## 2024-11-22 PROCEDURE — 99291 CRITICAL CARE FIRST HOUR: CPT

## 2024-11-22 PROCEDURE — 93010 ELECTROCARDIOGRAM REPORT: CPT

## 2024-11-22 RX ORDER — METOPROLOL TARTRATE 100 MG/1
75 TABLET, FILM COATED ORAL
Refills: 0 | Status: DISCONTINUED | OUTPATIENT
Start: 2024-11-22 | End: 2024-11-23

## 2024-11-22 RX ORDER — HEPARIN SODIUM 5000 [USP'U]/.5ML
30 INJECTION, SOLUTION INTRAVENOUS; SUBCUTANEOUS ONCE
Refills: 0 | Status: COMPLETED | OUTPATIENT
Start: 2024-11-22 | End: 2024-11-23

## 2024-11-22 RX ORDER — PANTOPRAZOLE SODIUM 40 MG/1
40 TABLET, DELAYED RELEASE ORAL
Refills: 0 | Status: DISCONTINUED | OUTPATIENT
Start: 2024-11-22 | End: 2024-11-23

## 2024-11-22 RX ORDER — ACETAMINOPHEN 500MG 500 MG/1
650 TABLET, COATED ORAL EVERY 6 HOURS
Refills: 0 | Status: DISCONTINUED | OUTPATIENT
Start: 2024-11-22 | End: 2024-11-23

## 2024-11-22 RX ORDER — SENNOSIDES 8.6 MG
2 TABLET ORAL AT BEDTIME
Refills: 0 | Status: DISCONTINUED | OUTPATIENT
Start: 2024-11-22 | End: 2024-11-23

## 2024-11-22 RX ORDER — CHLORHEXIDINE GLUCONATE 1.2 MG/ML
1 RINSE ORAL
Refills: 0 | Status: DISCONTINUED | OUTPATIENT
Start: 2024-11-22 | End: 2024-11-23

## 2024-11-22 RX ORDER — TRIAMTERENE AND HYDROCHLOROTHIAZIDE 25; 37.5 MG/1; MG/1
1 CAPSULE ORAL DAILY
Refills: 0 | Status: DISCONTINUED | OUTPATIENT
Start: 2024-11-22 | End: 2024-11-23

## 2024-11-22 RX ORDER — OXYCODONE HYDROCHLORIDE 30 MG/1
10 TABLET ORAL EVERY 4 HOURS
Refills: 0 | Status: DISCONTINUED | OUTPATIENT
Start: 2024-11-22 | End: 2024-11-23

## 2024-11-22 RX ORDER — OXYCODONE HYDROCHLORIDE 30 MG/1
5 TABLET ORAL EVERY 4 HOURS
Refills: 0 | Status: DISCONTINUED | OUTPATIENT
Start: 2024-11-22 | End: 2024-11-23

## 2024-11-22 RX ORDER — METHOCARBAMOL 500 MG/1
750 TABLET, FILM COATED ORAL EVERY 8 HOURS
Refills: 0 | Status: DISCONTINUED | OUTPATIENT
Start: 2024-11-22 | End: 2024-11-23

## 2024-11-22 RX ORDER — ROSUVASTATIN CALCIUM 5 MG/1
10 TABLET, FILM COATED ORAL AT BEDTIME
Refills: 0 | Status: DISCONTINUED | OUTPATIENT
Start: 2024-11-22 | End: 2024-11-23

## 2024-11-22 RX ORDER — GABAPENTIN 300 MG/1
300 CAPSULE ORAL THREE TIMES A DAY
Refills: 0 | Status: DISCONTINUED | OUTPATIENT
Start: 2024-11-22 | End: 2024-11-23

## 2024-11-22 RX ORDER — POLYETHYLENE GLYCOL 3350 17 G/17G
17 POWDER, FOR SOLUTION ORAL
Refills: 0 | Status: DISCONTINUED | OUTPATIENT
Start: 2024-11-22 | End: 2024-11-23

## 2024-11-22 RX ADMIN — CHLORHEXIDINE GLUCONATE 1 APPLICATION(S): 1.2 RINSE ORAL at 05:18

## 2024-11-22 RX ADMIN — POLYETHYLENE GLYCOL 3350 17 GRAM(S): 17 POWDER, FOR SOLUTION ORAL at 17:28

## 2024-11-22 RX ADMIN — METHOCARBAMOL 750 MILLIGRAM(S): 500 TABLET, FILM COATED ORAL at 22:08

## 2024-11-22 RX ADMIN — METOPROLOL TARTRATE 75 MILLIGRAM(S): 100 TABLET, FILM COATED ORAL at 05:18

## 2024-11-22 RX ADMIN — ENOXAPARIN SODIUM 40 MILLIGRAM(S): 30 INJECTION SUBCUTANEOUS at 17:28

## 2024-11-22 RX ADMIN — Medication 100 MILLIGRAM(S): at 13:11

## 2024-11-22 RX ADMIN — Medication 25 GRAM(S): at 07:23

## 2024-11-22 RX ADMIN — POLYETHYLENE GLYCOL 3350 17 GRAM(S): 17 POWDER, FOR SOLUTION ORAL at 05:17

## 2024-11-22 RX ADMIN — Medication 100 MILLIGRAM(S): at 22:08

## 2024-11-22 RX ADMIN — METHOCARBAMOL 750 MILLIGRAM(S): 500 TABLET, FILM COATED ORAL at 13:11

## 2024-11-22 RX ADMIN — Medication 100 MILLIGRAM(S): at 05:17

## 2024-11-22 RX ADMIN — ACETAMINOPHEN 500MG 650 MILLIGRAM(S): 500 TABLET, COATED ORAL at 06:00

## 2024-11-22 RX ADMIN — GABAPENTIN 300 MILLIGRAM(S): 300 CAPSULE ORAL at 05:17

## 2024-11-22 RX ADMIN — ROSUVASTATIN CALCIUM 10 MILLIGRAM(S): 5 TABLET, FILM COATED ORAL at 22:10

## 2024-11-22 RX ADMIN — METHOCARBAMOL 750 MILLIGRAM(S): 500 TABLET, FILM COATED ORAL at 05:17

## 2024-11-22 RX ADMIN — METOPROLOL TARTRATE 75 MILLIGRAM(S): 100 TABLET, FILM COATED ORAL at 17:28

## 2024-11-22 RX ADMIN — Medication 75 MILLILITER(S): at 05:16

## 2024-11-22 RX ADMIN — Medication 2 TABLET(S): at 22:08

## 2024-11-22 RX ADMIN — GABAPENTIN 300 MILLIGRAM(S): 300 CAPSULE ORAL at 23:02

## 2024-11-22 RX ADMIN — GABAPENTIN 300 MILLIGRAM(S): 300 CAPSULE ORAL at 13:11

## 2024-11-22 RX ADMIN — DEXAMETHASONE 4 MILLIGRAM(S): 1.5 TABLET ORAL at 05:18

## 2024-11-22 RX ADMIN — TRIAMTERENE AND HYDROCHLOROTHIAZIDE 1 TABLET(S): 25; 37.5 CAPSULE ORAL at 05:17

## 2024-11-22 RX ADMIN — ACETAMINOPHEN 500MG 650 MILLIGRAM(S): 500 TABLET, COATED ORAL at 05:24

## 2024-11-22 RX ADMIN — CHLORHEXIDINE GLUCONATE 1 APPLICATION(S): 1.2 RINSE ORAL at 22:08

## 2024-11-22 NOTE — PROGRESS NOTE ADULT - SUBJECTIVE AND OBJECTIVE BOX
NSICU PROGRESS NOTE     HPI  79F, admitted for T11-pelvis fusion to address LBP and coronal scoliosis. PMH AFib/flutter (Eliquis stopped for OR), HTN/HLD.     ICU COURSE:  11/20: admitted to NSCU s/p OR  11/21 extubated, continue with dex 4 q6 for airway edema,  PCA discontinued, TSLO brace ordered     EXAMINATION:  General: No acute distress  HEENT: Anicteric sclerae  Cardiac: [id number]  Lungs: Clear  Abdomen: Soft, non-tender, +BS  Extremities: No c/c/e  Skin/Incision Site: Clean, dry and intact  Neurologic: intubated, FC, LLE 5/5, RHF 4-/5 (pain-limited), RKE 4/5, RDF 4/5, RPF 5/5.    VITALS:   Vital Signs Last 24 Hrs  T(C): 36.6 (22 Nov 2024 07:00), Max: 47 (21 Nov 2024 12:00)  T(F): 97.9 (22 Nov 2024 07:00), Max: 116.6 (21 Nov 2024 12:00)  HR: 60 (22 Nov 2024 07:00) (60 - 69)  BP: --  BP(mean): --  RR: 18 (22 Nov 2024 07:00) (10 - 31)  SpO2: 99% (22 Nov 2024 07:00) (94% - 100%)    Parameters below as of 22 Nov 2024 07:00  Patient On (Oxygen Delivery Method): nasal cannula  O2 Flow (L/min): 3      Drips     Respiratory:  Mode: CPAP with PS  FiO2: 50  PEEP: 5  PS: 5  MAP: 7    ABG - ( 20 Nov 2024 17:15 )  pH, Arterial: 7.36  pH, Blood: x     /  pCO2: 45    /  pO2: 187   / HCO3: 25    / Base Excess: -0.3  /  SaO2: 99.4                LABS:                        8.0    11.37 )-----------( 161      ( 21 Nov 2024 23:32 )             23.6     11-21    138  |  103  |  19  ----------------------------<  149[H]  4.0   |  24  |  0.65      CAPILLARY BLOOD GLUCOSE      POCT Blood Glucose.: 145 mg/dL (21 Nov 2024 18:25)  POCT Blood Glucose.: 154 mg/dL (21 Nov 2024 12:00)      I&O's Summary    21 Nov 2024 07:01  -  22 Nov 2024 07:00  --------------------------------------------------------  IN: 2061.1 mL / OUT: 1905 mL / NET: 156.1 mL        Imaging   CXR     EKG     MEDICATION LEVELS:     IVF FLUIDS/MEDICATIONS:   MEDICATIONS  (STANDING):  ceFAZolin   IVPB 2000 milliGRAM(s) IV Intermittent every 8 hours  chlorhexidine 4% Liquid 1 Application(s) Topical daily  chlorhexidine 4% Liquid 1 Application(s) Topical <User Schedule>  enoxaparin Injectable 40 milliGRAM(s) SubCutaneous <User Schedule>  gabapentin 300 milliGRAM(s) Oral every 8 hours  lactated ringers. 1000 milliLiter(s) (75 mL/Hr) IV Continuous <Continuous>  methocarbamol 750 milliGRAM(s) Oral every 8 hours  metoprolol tartrate 75 milliGRAM(s) Oral two times a day  pantoprazole    Tablet 40 milliGRAM(s) Oral before breakfast  polyethylene glycol 3350 17 Gram(s) Oral two times a day  rosuvastatin 10 milliGRAM(s) Oral at bedtime  senna 2 Tablet(s) Oral at bedtime  triamterene 37.5 mG/hydrochlorothiazide 25 mG Tablet 1 Tablet(s) Oral daily    MEDICATIONS  (PRN):  acetaminophen     Tablet .. 650 milliGRAM(s) Oral every 6 hours PRN Mild Pain (1 - 3)  HYDROmorphone  Injectable 0.5 milliGRAM(s) IV Push every 6 hours PRN breakthrough pain  ondansetron Injectable 4 milliGRAM(s) IV Push once PRN Nausea and/or Vomiting  oxyCODONE    IR 5 milliGRAM(s) Oral every 4 hours PRN Moderate Pain (4 - 6)  oxyCODONE    IR 10 milliGRAM(s) Oral every 8 hours PRN Severe Pain (7 - 10)  sodium chloride 0.9% lock flush 10 milliLiter(s) IV Push every 1 hour PRN Pre/post blood products, medications, blood draw, and to maintain line patency

## 2024-11-22 NOTE — PROGRESS NOTE ADULT - ASSESSMENT
Rpt CBC in AM     S/S eval-p     TLSO brace when OOB- ordered     Standing scoli series XR when tolerates     TTE-p    LED-p

## 2024-11-22 NOTE — PROGRESS NOTE ADULT - SUBJECTIVE AND OBJECTIVE BOX
Patient seen and examined at bedside.    --Anticoagulation--  enoxaparin Injectable 40 milliGRAM(s) SubCutaneous <User Schedule>    T(C): 36.7 (11-22-24 @ 03:00), Max: 47 (11-21-24 @ 12:00)  HR: 61 (11-22-24 @ 05:00) (60 - 68)  BP: --  RR: 15 (11-22-24 @ 05:00) (10 - 31)  SpO2: 99% (11-22-24 @ 05:00) (94% - 100%)  Wt(kg): --    Exam: awake Ox3, PERRL, EOMI, BUE 5/5, LLE 5/5, RLE HF 4-/5, KE 4/5, DF 4/5, PF 5/5 (pain-limited)

## 2024-11-22 NOTE — PROGRESS NOTE ADULT - ASSESSMENT
ASSESSMENT/PLAN:    NEURO:  -POD2 T11-pelvis fusion, L4-5, L5-S1 TLIFs, T12-L1, L1-2, L3-4 PCOs.   -11/21 CT T/L-spine done with good hardware placement   -TLSO brace for OOB.  -2 deep HMVs, 1 superficial HMV, Prevena per NSGY.  -Standing scoli XR films pending.   -Pain control: PCA dc'ed on 11/21, continue oxy 5 q 4, oxy 10 q4, IV Dilaudid 0.5 q6, gabapentin 300 TID, Robaxin 750 q TID as per pain control  Activity: [] mobilize as tolerated [] Bedrest [X] PT [X] OT [] PMNR      CV:  SBP goal 100-160  maxzide during the day, continue metoprolol  Continue Crestor 10 mg home med  Tachycardic to 110s, will repeat EKG.    PULM:  Extubated on 11/21 11/21 CXR clear lungs   SpO2 goal >92%.    RENAL:  IVL  Monitor IOs  Replete lytes PRN  D/c Mendez in AM if cleared by NSGY.    GI:  Diet: ngtube start feeds failed dysphagia screen  Pending formal S&S eval.  GI prophylaxis [X] not indicated [] PPI [] other:  Bowel regimen [] colace [x] senna [X] other: Miralax, dulcolax  LBM: PTA    ENDO:   Goal euglycemia (-180)    HEME/ONC:  S/p 2u pRBC, repeat CBC tonight pending.   Repeat CBC in AM.  SCDs and SQL chemoppx    ID:  afebrile   Ancef while HMV drains in.    CODE STATUS:  [X] Full Code [] DNR [] DNI [] Palliative/Comfort Care    DISPOSITION:  NSCU   ASSESSMENT/PLAN:    NEURO:  -POD2 T11-pelvis fusion, L4-5, L5-S1 TLIFs, T12-L1, L1-2, L3-4 PCOs.   -11/21 CT T/L-spine done with good hardware placement   -TLSO brace for OOB.  -2 deep HMVs, 1 superficial HMV, Prevena per NSGY.  -Standing scoli XR films pending.   -Pain control: PCA dc'ed on 11/21, continue oxy 5 q 4, oxy 10 q4, IV Dilaudid 0.5 q6, gabapentin 300 TID, Robaxin 750 q TID as per pain control  Activity: [] mobilize as tolerated [] Bedrest [X] PT [X] OT [] PMNR      CV:  SBP goal 100-160  maxzide during the day, continue metoprolol  Continue Crestor 10 mg home med  Tachycardic to 110s, will repeat EKG.    PULM:  Extubated on 11/21 11/21 CXR clear lungs   SpO2 goal >92%.    RENAL:  IVL  Monitor IOs  Replete lytes PRN  D/c Mendez in AM if cleared by NSGY.    GI:  Diet: ngtube start feeds failed dysphagia screen  Pending formal S&S eval.  GI prophylaxis [X] not indicated [] PPI [] other:  Bowel regimen [] colace [x] senna [X] other: Miralax, dulcolax  LBM: PTA    ENDO:   Goal euglycemia (-180)    HEME/ONC:  S/p 2u pRBC, repeat CBC tonight pending.   Repeat CBC in AM.  SCDs and SQL chemoppx  RUE Duplex Venous and Arterial because of ecchymosis around previous IV and A-line sites.    ID:  afebrile   Ancef while HMV drains in.    CODE STATUS:  [X] Full Code [] DNR [] DNI [] Palliative/Comfort Care    DISPOSITION:  NSCU

## 2024-11-22 NOTE — SWALLOW BEDSIDE ASSESSMENT ADULT - SWALLOW EVAL: DIAGNOSIS
Order received for Bedside Swallow Evaluation. Chart reviewed. Attempted to see patient however currently available; getting dopplers at bedside; plan is to transfer to floor this afternoon. Will follow up at earliest availability. d/w ERNESTINA Cabrera; DIOGENES Anderson Order received for Bedside Swallow Evaluation. Chart reviewed. Attempted to see patient however currently unavailable; getting dopplers at bedside; plan is to transfer to floor this afternoon. Will follow up at earliest availability. d/w ERNESTINA Cabrera; DIOGENES Anderson

## 2024-11-22 NOTE — PROGRESS NOTE ADULT - SUBJECTIVE AND OBJECTIVE BOX
NSICU PROGRESS NOTE     HPI  79F, admitted for T11-pelvis fusion to address LBP and coronal scoliosis. PMH AFib/flutter (Eliquis stopped for OR), HTN/HLD.     ICU COURSE:  11/20: admitted to NSCU s/p OR  11/21: extubated, continue with dex 4 q6 for airway edema,  PCA discontinued, TSLO brace ordered   11/22: received 2u pRBC for Hgb drop to 6.3 from 8.0    EXAMINATION:  General: No acute distress  HEENT: Anicteric sclerae  Cardiac: [id number]  Lungs: Clear  Abdomen: Soft, non-tender, +BS  Extremities: No c/c/e  Skin/Incision Site: Clean, dry and intact  Neurologic: intubated, FC, LLE 5/5, RHF 4-/5 (pain-limited), RKE 4/5, RDF 4/5, RPF 5/5.    VITALS:   Vital Signs Last 24 Hrs  T(C): 36.6 (22 Nov 2024 07:00), Max: 47 (21 Nov 2024 12:00)  T(F): 97.9 (22 Nov 2024 07:00), Max: 116.6 (21 Nov 2024 12:00)  HR: 60 (22 Nov 2024 07:00) (60 - 69)  BP: --  BP(mean): --  RR: 18 (22 Nov 2024 07:00) (10 - 31)  SpO2: 99% (22 Nov 2024 07:00) (94% - 100%)    Parameters below as of 22 Nov 2024 07:00  Patient On (Oxygen Delivery Method): nasal cannula  O2 Flow (L/min): 3      Drips     Respiratory:  Mode: CPAP with PS  FiO2: 50  PEEP: 5  PS: 5  MAP: 7    ABG - ( 20 Nov 2024 17:15 )  pH, Arterial: 7.36  pH, Blood: x     /  pCO2: 45    /  pO2: 187   / HCO3: 25    / Base Excess: -0.3  /  SaO2: 99.4                LABS:                        8.0    11.37 )-----------( 161      ( 21 Nov 2024 23:32 )             23.6     11-21    138  |  103  |  19  ----------------------------<  149[H]  4.0   |  24  |  0.65      CAPILLARY BLOOD GLUCOSE      POCT Blood Glucose.: 145 mg/dL (21 Nov 2024 18:25)  POCT Blood Glucose.: 154 mg/dL (21 Nov 2024 12:00)      I&O's Summary    21 Nov 2024 07:01  -  22 Nov 2024 07:00  --------------------------------------------------------  IN: 2061.1 mL / OUT: 1905 mL / NET: 156.1 mL        Imaging   CXR     EKG     MEDICATION LEVELS:     IVF FLUIDS/MEDICATIONS:   MEDICATIONS  (STANDING):  ceFAZolin   IVPB 2000 milliGRAM(s) IV Intermittent every 8 hours  chlorhexidine 4% Liquid 1 Application(s) Topical daily  chlorhexidine 4% Liquid 1 Application(s) Topical <User Schedule>  enoxaparin Injectable 40 milliGRAM(s) SubCutaneous <User Schedule>  gabapentin 300 milliGRAM(s) Oral every 8 hours  lactated ringers. 1000 milliLiter(s) (75 mL/Hr) IV Continuous <Continuous>  methocarbamol 750 milliGRAM(s) Oral every 8 hours  metoprolol tartrate 75 milliGRAM(s) Oral two times a day  pantoprazole    Tablet 40 milliGRAM(s) Oral before breakfast  polyethylene glycol 3350 17 Gram(s) Oral two times a day  rosuvastatin 10 milliGRAM(s) Oral at bedtime  senna 2 Tablet(s) Oral at bedtime  triamterene 37.5 mG/hydrochlorothiazide 25 mG Tablet 1 Tablet(s) Oral daily    MEDICATIONS  (PRN):  acetaminophen     Tablet .. 650 milliGRAM(s) Oral every 6 hours PRN Mild Pain (1 - 3)  HYDROmorphone  Injectable 0.5 milliGRAM(s) IV Push every 6 hours PRN breakthrough pain  ondansetron Injectable 4 milliGRAM(s) IV Push once PRN Nausea and/or Vomiting  oxyCODONE    IR 5 milliGRAM(s) Oral every 4 hours PRN Moderate Pain (4 - 6)  oxyCODONE    IR 10 milliGRAM(s) Oral every 8 hours PRN Severe Pain (7 - 10)  sodium chloride 0.9% lock flush 10 milliLiter(s) IV Push every 1 hour PRN Pre/post blood products, medications, blood draw, and to maintain line patency       NSICU PROGRESS NOTE     HPI  79F, admitted for T11-pelvis fusion to address LBP and coronal scoliosis. PMH AFib/flutter (Eliquis stopped for OR), HTN/HLD.     ICU COURSE:  11/20: admitted to NSCU s/p OR  11/21: extubated, continue with dex 4 q6 for airway edema,  PCA discontinued, TSLO brace ordered   11/22: received 2u pRBC for Hgb drop to 6.3 from 8.0    EXAMINATION:  General: No acute distress  HEENT: Anicteric sclerae  Cardiac: [id number]  Lungs: Clear  Abdomen: Soft, non-tender, +BS  Extremities: RUE ecchymotic around previous IV and A-line site.  Skin/Incision Site: Clean, dry and intact  Neurologic: intubated, FC, LLE 5/5, RHF 4-/5 (pain-limited), RKE 4/5, RDF 4/5, RPF 5/5.    VITALS:   Vital Signs Last 24 Hrs  T(C): 36.6 (22 Nov 2024 07:00), Max: 47 (21 Nov 2024 12:00)  T(F): 97.9 (22 Nov 2024 07:00), Max: 116.6 (21 Nov 2024 12:00)  HR: 60 (22 Nov 2024 07:00) (60 - 69)  BP: --  BP(mean): --  RR: 18 (22 Nov 2024 07:00) (10 - 31)  SpO2: 99% (22 Nov 2024 07:00) (94% - 100%)    Parameters below as of 22 Nov 2024 07:00  Patient On (Oxygen Delivery Method): nasal cannula  O2 Flow (L/min): 3      Drips     Respiratory:  Mode: CPAP with PS  FiO2: 50  PEEP: 5  PS: 5  MAP: 7    ABG - ( 20 Nov 2024 17:15 )  pH, Arterial: 7.36  pH, Blood: x     /  pCO2: 45    /  pO2: 187   / HCO3: 25    / Base Excess: -0.3  /  SaO2: 99.4                LABS:                        8.0    11.37 )-----------( 161      ( 21 Nov 2024 23:32 )             23.6     11-21    138  |  103  |  19  ----------------------------<  149[H]  4.0   |  24  |  0.65      CAPILLARY BLOOD GLUCOSE      POCT Blood Glucose.: 145 mg/dL (21 Nov 2024 18:25)  POCT Blood Glucose.: 154 mg/dL (21 Nov 2024 12:00)      I&O's Summary    21 Nov 2024 07:01  -  22 Nov 2024 07:00  --------------------------------------------------------  IN: 2061.1 mL / OUT: 1905 mL / NET: 156.1 mL        Imaging   CXR     EKG     MEDICATION LEVELS:     IVF FLUIDS/MEDICATIONS:   MEDICATIONS  (STANDING):  ceFAZolin   IVPB 2000 milliGRAM(s) IV Intermittent every 8 hours  chlorhexidine 4% Liquid 1 Application(s) Topical daily  chlorhexidine 4% Liquid 1 Application(s) Topical <User Schedule>  enoxaparin Injectable 40 milliGRAM(s) SubCutaneous <User Schedule>  gabapentin 300 milliGRAM(s) Oral every 8 hours  lactated ringers. 1000 milliLiter(s) (75 mL/Hr) IV Continuous <Continuous>  methocarbamol 750 milliGRAM(s) Oral every 8 hours  metoprolol tartrate 75 milliGRAM(s) Oral two times a day  pantoprazole    Tablet 40 milliGRAM(s) Oral before breakfast  polyethylene glycol 3350 17 Gram(s) Oral two times a day  rosuvastatin 10 milliGRAM(s) Oral at bedtime  senna 2 Tablet(s) Oral at bedtime  triamterene 37.5 mG/hydrochlorothiazide 25 mG Tablet 1 Tablet(s) Oral daily    MEDICATIONS  (PRN):  acetaminophen     Tablet .. 650 milliGRAM(s) Oral every 6 hours PRN Mild Pain (1 - 3)  HYDROmorphone  Injectable 0.5 milliGRAM(s) IV Push every 6 hours PRN breakthrough pain  ondansetron Injectable 4 milliGRAM(s) IV Push once PRN Nausea and/or Vomiting  oxyCODONE    IR 5 milliGRAM(s) Oral every 4 hours PRN Moderate Pain (4 - 6)  oxyCODONE    IR 10 milliGRAM(s) Oral every 8 hours PRN Severe Pain (7 - 10)  sodium chloride 0.9% lock flush 10 milliLiter(s) IV Push every 1 hour PRN Pre/post blood products, medications, blood draw, and to maintain line patency

## 2024-11-22 NOTE — SPEECH LANGUAGE PATHOLOGY EVALUATION - SLP PERTINENT HISTORY OF CURRENT PROBLEM
79 year  old RHD  female  with  c/o non radiating lower back pain for  many years, pain become more  troublesome for last 4 yrs  with  severe pain and lower extremity weakness of which sometimes worse in right side is worse . She tried all pain management modalities like NSAIDS, , physical therapy, epidural  injects and facet blocks with no relief at all".   Pt states she is having scoliosis which made symptoms worse with few  episodes of right side sciatic pain. Her pain increases with ambulation and movement and decreases with sitting. Worse pain is standing still. No recent trauma. S/p surgical consult & scheduled for T11-Pelvis posterior stabilization and fusion on 11/19/2024.

## 2024-11-22 NOTE — SPEECH LANGUAGE PATHOLOGY EVALUATION - SLP DIAGNOSIS
Order received for speech/language evaluation. Chart reviewed. Attempted to see patient however currently available; getting dopplers; plan is to transfer to floor this afternoon. Will follow up at earliest availability. d/w ERNESTINA Cabrera; DIOGENES Anderson Order received for Speech/Language Evaluation (SLE). Order placed in error per d/w ABEL Bruno. Pt AA+Ox4 s/p spine surgery. No indication for SLE. Pt will be seen for bedside swallow evaluation at earliest availability.

## 2024-11-22 NOTE — PROGRESS NOTE ADULT - ATTENDING COMMENTS
extubated yesterday.  has NG tube in, pending speech/swallow, continue tube feeds for now.  Hgb drifting down, now <7; patient hemodynamically stable but significant drain output still noted.  will remain in NSCU, transfuse, hold DVT prophy.

## 2024-11-22 NOTE — SPEECH LANGUAGE PATHOLOGY EVALUATION - COMMENTS
PMH : HTN, HLD, Afib atrial flutter in 2013 ( on Xarelto per patient surgeon advised to stop for 10 days last dose 11/10/24), h/o sick sinus syndrome/ haert block,  atrial flutter s/p cardiac ablation x1 ( 2016), PPM  dependent dual chamber status since 2013( revised / replaced batter in 2023/last interrogation in 06/2024). history of breast augmentation (silicon breast implants) & chronic lower back pain.    11/20: OR for T11-pelvis instrumentation and fusion; L4-L5, L5-S1 TLIFs, T12-L1, L1-L2, L2-L3, L3-L4 posterior column osteotomies for coronal scoliosis deformity correction  11/21: extubated, continue with dex 4 q6 for airway edema,  PCA discontinued, TSLO brace ordered

## 2024-11-22 NOTE — PROGRESS NOTE ADULT - ASSESSMENT
ASSESSMENT/PLAN:    NEURO:  -POD2 T11-pelvis fusion, L4-5, L5-S1 TLIFs, T12-L1, L1-2, L3-4 PCOs.   -11/21 CT T/L-spine fu official   -TLSO brace for OOB.  -2 deep HMVs, 1 superficial HMV, Prevena per NSGY.  -Standing scoli XR films pending.   -Pain control: PCA dc'ed on 11/21, continue oxy 5 q 4, oxy 10 q4, IV Dilaudid 0.5 q6, gabapentin 300 TID, Robaxin 750 q TID as per pain control  Activity: [] mobilize as tolerated [] Bedrest [X] PT [X] OT [] PMNR      CV:  SBP goal 100-160  off cardene gtt  Restarted maxzide during the day, continue metoprolol  Continue Crestor 10 mg home med  EKG    PULM:  Extubated on 11/21  continue dex 4 q6 for airway edema   received 1 dose of racemic epi in 11/20 11/21 CXR clear lungs     RENAL:  LR @ 75  Monitor IOs  Replete lytes PRN  Mendez    GI:  Diet: NPO pending official SLP recs  GI prophylaxis [] not indicated [X] PPI [] other:  Bowel regimen [] colace [x] senna [X] other: Miralax  LBM: PTA    ENDO:   Goal euglycemia (-180)    HEME/ONC:  Rpt CBC tonight   VTE prophylaxis: SCDs  chemo-ppx tonight     ID:  afebrile   Ancef while HMV drains in.    CODE STATUS:  [X] Full Code [] DNR [] DNI [] Palliative/Comfort Care    DISPOSITION:  [X] ICU [] Stroke Unit [] Floor [] EMU [] RCU [] PCU   ASSESSMENT/PLAN:    NEURO:  -POD2 T11-pelvis fusion, L4-5, L5-S1 TLIFs, T12-L1, L1-2, L3-4 PCOs.   -11/21 CT T/L-spine fu official   -TLSO brace for OOB.  -2 deep HMVs, 1 superficial HMV, Prevena per NSGY.  -Standing scoli XR films pending.   -Pain control: PCA dc'ed on 11/21, continue oxy 5 q 4, oxy 10 q4, IV Dilaudid 0.5 q6, gabapentin 300 TID, Robaxin 750 q TID as per pain control  Activity: [] mobilize as tolerated [] Bedrest [X] PT [X] OT [] PMNR      CV:  SBP goal 100-160  maxzide during the day, continue metoprolol  Continue Crestor 10 mg home med  EKG    PULM:  Extubated on 11/21  continue dex 4 q6 for airway edema   received 1 dose of racemic epi in 11/20 11/21 CXR clear lungs     RENAL:  IVL  Monitor IOs  Replete lytes PRN  Mendez    GI:  Diet: ngtube start feeds failed dysphagia screen  GI prophylaxis [] not indicated [X] PPI [] other:  Bowel regimen [] colace [x] senna [X] other: Miralax  LBM: PTA    ENDO:   Goal euglycemia (-180)    HEME/ONC:  Rpt CBC tonight   VTE prophylaxis: SCDs  chemo-ppx     ID:  afebrile   Ancef while HMV drains in.    CODE STATUS:  [X] Full Code [] DNR [] DNI [] Palliative/Comfort Care    DISPOSITION:  Floor

## 2024-11-23 LAB
ANION GAP SERPL CALC-SCNC: 10 MMOL/L — SIGNIFICANT CHANGE UP (ref 5–17)
BASOPHILS # BLD AUTO: 0.02 K/UL — SIGNIFICANT CHANGE UP (ref 0–0.2)
BASOPHILS NFR BLD AUTO: 0.2 % — SIGNIFICANT CHANGE UP (ref 0–2)
BUN SERPL-MCNC: 21 MG/DL — SIGNIFICANT CHANGE UP (ref 7–23)
CALCIUM SERPL-MCNC: 8.4 MG/DL — SIGNIFICANT CHANGE UP (ref 8.4–10.5)
CHLORIDE SERPL-SCNC: 102 MMOL/L — SIGNIFICANT CHANGE UP (ref 96–108)
CO2 SERPL-SCNC: 27 MMOL/L — SIGNIFICANT CHANGE UP (ref 22–31)
CREAT SERPL-MCNC: 0.67 MG/DL — SIGNIFICANT CHANGE UP (ref 0.5–1.3)
EGFR: 89 ML/MIN/1.73M2 — SIGNIFICANT CHANGE UP
EOSINOPHIL # BLD AUTO: 0 K/UL — SIGNIFICANT CHANGE UP (ref 0–0.5)
EOSINOPHIL NFR BLD AUTO: 0 % — SIGNIFICANT CHANGE UP (ref 0–6)
GLUCOSE SERPL-MCNC: 114 MG/DL — HIGH (ref 70–99)
HCT VFR BLD CALC: 33.1 % — LOW (ref 34.5–45)
HGB BLD-MCNC: 11 G/DL — LOW (ref 11.5–15.5)
IMM GRANULOCYTES NFR BLD AUTO: 0.8 % — SIGNIFICANT CHANGE UP (ref 0–0.9)
LYMPHOCYTES # BLD AUTO: 1.41 K/UL — SIGNIFICANT CHANGE UP (ref 1–3.3)
LYMPHOCYTES # BLD AUTO: 10.8 % — LOW (ref 13–44)
MAGNESIUM SERPL-MCNC: 2.3 MG/DL — SIGNIFICANT CHANGE UP (ref 1.6–2.6)
MCHC RBC-ENTMCNC: 28.5 PG — SIGNIFICANT CHANGE UP (ref 27–34)
MCHC RBC-ENTMCNC: 33.2 G/DL — SIGNIFICANT CHANGE UP (ref 32–36)
MCV RBC AUTO: 85.8 FL — SIGNIFICANT CHANGE UP (ref 80–100)
MONOCYTES # BLD AUTO: 1.53 K/UL — HIGH (ref 0–0.9)
MONOCYTES NFR BLD AUTO: 11.7 % — SIGNIFICANT CHANGE UP (ref 2–14)
NEUTROPHILS # BLD AUTO: 10 K/UL — HIGH (ref 1.8–7.4)
NEUTROPHILS NFR BLD AUTO: 76.5 % — SIGNIFICANT CHANGE UP (ref 43–77)
NRBC # BLD: 0 /100 WBCS — SIGNIFICANT CHANGE UP (ref 0–0)
PHOSPHATE SERPL-MCNC: 2.8 MG/DL — SIGNIFICANT CHANGE UP (ref 2.5–4.5)
PLATELET # BLD AUTO: 179 K/UL — SIGNIFICANT CHANGE UP (ref 150–400)
POTASSIUM SERPL-MCNC: 4 MMOL/L — SIGNIFICANT CHANGE UP (ref 3.5–5.3)
POTASSIUM SERPL-SCNC: 4 MMOL/L — SIGNIFICANT CHANGE UP (ref 3.5–5.3)
RBC # BLD: 3.86 M/UL — SIGNIFICANT CHANGE UP (ref 3.8–5.2)
RBC # FLD: 17.9 % — HIGH (ref 10.3–14.5)
SODIUM SERPL-SCNC: 139 MMOL/L — SIGNIFICANT CHANGE UP (ref 135–145)
WBC # BLD: 13.07 K/UL — HIGH (ref 3.8–10.5)
WBC # FLD AUTO: 13.07 K/UL — HIGH (ref 3.8–10.5)

## 2024-11-23 PROCEDURE — 99232 SBSQ HOSP IP/OBS MODERATE 35: CPT

## 2024-11-23 PROCEDURE — 93971 EXTREMITY STUDY: CPT | Mod: 26,RT

## 2024-11-23 PROCEDURE — 93931 UPPER EXTREMITY STUDY: CPT | Mod: 26,RT

## 2024-11-23 RX ORDER — TRIAMTERENE AND HYDROCHLOROTHIAZIDE 25; 37.5 MG/1; MG/1
1 CAPSULE ORAL DAILY
Refills: 0 | Status: DISCONTINUED | OUTPATIENT
Start: 2024-11-23 | End: 2024-12-02

## 2024-11-23 RX ORDER — PANTOPRAZOLE SODIUM 40 MG/1
40 TABLET, DELAYED RELEASE ORAL
Refills: 0 | Status: DISCONTINUED | OUTPATIENT
Start: 2024-11-23 | End: 2024-12-01

## 2024-11-23 RX ORDER — SENNOSIDES 8.6 MG
2 TABLET ORAL AT BEDTIME
Refills: 0 | Status: DISCONTINUED | OUTPATIENT
Start: 2024-11-23 | End: 2024-12-02

## 2024-11-23 RX ORDER — GABAPENTIN 300 MG/1
300 CAPSULE ORAL THREE TIMES A DAY
Refills: 0 | Status: DISCONTINUED | OUTPATIENT
Start: 2024-11-23 | End: 2024-11-25

## 2024-11-23 RX ORDER — METHOCARBAMOL 500 MG/1
750 TABLET, FILM COATED ORAL EVERY 8 HOURS
Refills: 0 | Status: DISCONTINUED | OUTPATIENT
Start: 2024-11-23 | End: 2024-11-29

## 2024-11-23 RX ORDER — POLYETHYLENE GLYCOL 3350 17 G/17G
17 POWDER, FOR SOLUTION ORAL
Refills: 0 | Status: DISCONTINUED | OUTPATIENT
Start: 2024-11-23 | End: 2024-12-02

## 2024-11-23 RX ORDER — ACETAMINOPHEN 500MG 500 MG/1
650 TABLET, COATED ORAL EVERY 6 HOURS
Refills: 0 | Status: DISCONTINUED | OUTPATIENT
Start: 2024-11-23 | End: 2024-12-02

## 2024-11-23 RX ORDER — ROSUVASTATIN CALCIUM 5 MG/1
10 TABLET, FILM COATED ORAL AT BEDTIME
Refills: 0 | Status: DISCONTINUED | OUTPATIENT
Start: 2024-11-23 | End: 2024-12-02

## 2024-11-23 RX ORDER — METOPROLOL TARTRATE 100 MG/1
75 TABLET, FILM COATED ORAL
Refills: 0 | Status: DISCONTINUED | OUTPATIENT
Start: 2024-11-23 | End: 2024-11-25

## 2024-11-23 RX ORDER — SODIUM,POTASSIUM PHOSPHATES 278-250MG
1 POWDER IN PACKET (EA) ORAL ONCE
Refills: 0 | Status: COMPLETED | OUTPATIENT
Start: 2024-11-23 | End: 2024-11-23

## 2024-11-23 RX ADMIN — GABAPENTIN 300 MILLIGRAM(S): 300 CAPSULE ORAL at 21:48

## 2024-11-23 RX ADMIN — HEPARIN SODIUM 85 MILLIMOLE(S): 5000 INJECTION, SOLUTION INTRAVENOUS; SUBCUTANEOUS at 00:34

## 2024-11-23 RX ADMIN — METOPROLOL TARTRATE 75 MILLIGRAM(S): 100 TABLET, FILM COATED ORAL at 17:07

## 2024-11-23 RX ADMIN — Medication 10 MILLIGRAM(S): at 11:16

## 2024-11-23 RX ADMIN — METHOCARBAMOL 750 MILLIGRAM(S): 500 TABLET, FILM COATED ORAL at 21:48

## 2024-11-23 RX ADMIN — Medication 100 MILLIGRAM(S): at 06:02

## 2024-11-23 RX ADMIN — POLYETHYLENE GLYCOL 3350 17 GRAM(S): 17 POWDER, FOR SOLUTION ORAL at 17:07

## 2024-11-23 RX ADMIN — Medication 2 TABLET(S): at 21:48

## 2024-11-23 RX ADMIN — PANTOPRAZOLE SODIUM 40 MILLIGRAM(S): 40 TABLET, DELAYED RELEASE ORAL at 06:03

## 2024-11-23 RX ADMIN — Medication 100 MILLIGRAM(S): at 21:48

## 2024-11-23 RX ADMIN — ROSUVASTATIN CALCIUM 10 MILLIGRAM(S): 5 TABLET, FILM COATED ORAL at 21:48

## 2024-11-23 RX ADMIN — Medication 1 PACKET(S): at 08:44

## 2024-11-23 RX ADMIN — GABAPENTIN 300 MILLIGRAM(S): 300 CAPSULE ORAL at 06:16

## 2024-11-23 RX ADMIN — ACETAMINOPHEN 500MG 650 MILLIGRAM(S): 500 TABLET, COATED ORAL at 18:24

## 2024-11-23 RX ADMIN — METHOCARBAMOL 750 MILLIGRAM(S): 500 TABLET, FILM COATED ORAL at 06:03

## 2024-11-23 RX ADMIN — POLYETHYLENE GLYCOL 3350 17 GRAM(S): 17 POWDER, FOR SOLUTION ORAL at 06:03

## 2024-11-23 RX ADMIN — Medication 100 MILLIGRAM(S): at 13:07

## 2024-11-23 RX ADMIN — METOPROLOL TARTRATE 75 MILLIGRAM(S): 100 TABLET, FILM COATED ORAL at 06:03

## 2024-11-23 RX ADMIN — ENOXAPARIN SODIUM 40 MILLIGRAM(S): 30 INJECTION SUBCUTANEOUS at 17:07

## 2024-11-23 RX ADMIN — GABAPENTIN 300 MILLIGRAM(S): 300 CAPSULE ORAL at 13:07

## 2024-11-23 RX ADMIN — METHOCARBAMOL 750 MILLIGRAM(S): 500 TABLET, FILM COATED ORAL at 13:08

## 2024-11-23 RX ADMIN — ACETAMINOPHEN 500MG 650 MILLIGRAM(S): 500 TABLET, COATED ORAL at 18:55

## 2024-11-23 RX ADMIN — TRIAMTERENE AND HYDROCHLOROTHIAZIDE 1 TABLET(S): 25; 37.5 CAPSULE ORAL at 06:03

## 2024-11-23 NOTE — SWALLOW BEDSIDE ASSESSMENT ADULT - SLP GENERAL OBSERVATIONS
Patient encountered semi-supine in bed, on 2L NC, A&Ox4, +NGT, +ICU monitoring, +IV, +wound-vac. Patient with fluent speech, intact command following, denies dysphagia hx or any difficulty eating/drinking.

## 2024-11-23 NOTE — PROGRESS NOTE ADULT - SUBJECTIVE AND OBJECTIVE BOX
Patient seen and examined at bedside.    --Anticoagulation--  enoxaparin Injectable 40 milliGRAM(s) SubCutaneous <User Schedule>    T(C): 36.7 (11-23-24 @ 03:00), Max: 36.8 (11-22-24 @ 15:00)  HR: 100 (11-23-24 @ 04:00) (60 - 115)  BP: 136/62 (11-23-24 @ 04:00) (97/53 - 140/63)  RR: 20 (11-23-24 @ 04:00) (11 - 32)  SpO2: 97% (11-23-24 @ 04:00) (92% - 99%)  Wt(kg): --    Exam: awake Ox3, PERRL, EOMI, BUE 5/5, LLE 5/5, RLE HF 4-/5, KE 4/5, DF 4/5, PF 5/5 (pain-limited)

## 2024-11-23 NOTE — SWALLOW BEDSIDE ASSESSMENT ADULT - PHARYNGEAL PHASE
Timely initiation of pharyngeal swallow trigger with present hyo-laryngeal excursion upon palpation.

## 2024-11-23 NOTE — PROGRESS NOTE ADULT - ASSESSMENT
S/S eval-p     EP consult    RUE arterial/venous duplex-p    dc lien in AM?    TLSO brace when OOB- ordered     Standing scoli series XR when tolerates     PT- AR     PMNR-p

## 2024-11-23 NOTE — SWALLOW BEDSIDE ASSESSMENT ADULT - COMMENTS
PMH : HTN, HLD, Afib atrial flutter in 2013 ( on Xarelto per patient surgeon advised to stop for 10 days last dose 11/10/24), h/o sick sinus syndrome/ haert block,  atrial flutter s/p cardiac ablation x1 ( 2016), PPM  dependent dual chamber status since 2013( revised / replaced batter in 2023/last interrogation in 06/2024). history of breast augmentation (silicon breast implants) & chronic lower back pain.    11/20: OR for T11-pelvis instrumentation and fusion; L4-L5, L5-S1 TLIFs, T12-L1, L1-L2, L2-L3, L3-L4 posterior column osteotomies for coronal scoliosis deformity correction  11/21: extubated, continue with dex 4 q6 for airway edema,  PCA discontinued, TSLO brace ordered
PMH : HTN, HLD, Afib atrial flutter in 2013 ( on Xarelto per patient surgeon advised to stop for 10 days last dose 11/10/24), h/o sick sinus syndrome/ haert block,  atrial flutter s/p cardiac ablation x1 ( 2016), PPM  dependent dual chamber status since 2013( revised / replaced batter in 2023/last interrogation in 06/2024). history of breast augmentation (silicon breast implants) & chronic lower back pain.    11/21: extubated, continue with dex 4 q6 for airway edema,  PCA discontinued, TSLO brace ordered.   11/22: received 2u pRBC for Hgb drop to 6.3 from 8.0

## 2024-11-23 NOTE — SWALLOW BEDSIDE ASSESSMENT ADULT - SLP PERTINENT HISTORY OF CURRENT PROBLEM
79 year  old RHD  female  with  c/o non radiating lower back pain for  many years, pain become more  troublesome for last 4 yrs  with  severe pain and lower extremity weakness of which sometimes worse in right side is worse . She tried all pain management modalities like NSAIDS, , physical therapy, epidural  injects and facet blocks with no relief at all".   Pt states she is having scoliosis which made symptoms worse with few  episodes of right side sciatic pain. Her pain increases with ambulation and movement and decreases with sitting. Worse pain is standing still. No recent trauma. S/p T11-Pelvis posterior stabilization and fusion on 11/20/2024.
79 year  old RHD  female  with  c/o non radiating lower back pain for  many years, pain become more  troublesome for last 4 yrs  with  severe pain and lower extremity weakness of which sometimes worse in right side is worse . She tried all pain management modalities like NSAIDS, , physical therapy, epidural  injects and facet blocks with no relief at all".   Pt states she is having scoliosis which made symptoms worse with few  episodes of right side sciatic pain. Her pain increases with ambulation and movement and decreases with sitting. Worse pain is standing still. No recent trauma. S/p surgical consult & scheduled for T11-Pelvis posterior stabilization and fusion on 11/19/2024.

## 2024-11-23 NOTE — PROGRESS NOTE ADULT - ASSESSMENT
ASSESSMENT/PLAN:    NEURO:  -s/p T11-pelvis fusion, L4-5, L5-S1 TLIFs, T12-L1, L1-2, L3-4 PCOs.   -11/21 CT T/L-spine done with good hardware placement   -TLSO brace for OOB.  -drains per NSGY.  -Standing scoli XR films    -Pain control: PCA dc'ed on 11/21, continue oxy 5 q 4, oxy 10 q4, IV Dilaudid 0.5 q6, gabapentin 300 TID, Robaxin 750 q TID as per pain control  Activity: [] mobilize as tolerated [] Bedrest [X] PT [X] OT [] PMNR      CV:  SBP goal 100-160  maxzide during the day, continue metoprolol  Continue Crestor 10 mg home med  Tachycardic to 110s, will repeat EKG.    PULM:  Extubated on 11/21 11/21 CXR clear lungs   SpO2 goal >92%.    RENAL:  IVL  Monitor IOs  Replete lytes PRN  D/c Mendez in AM if cleared by NSGY.    GI:  Diet: ngtube start feeds failed dysphagia screen  Pending formal S&S eval.  GI prophylaxis [X] not indicated [] PPI [] other:  Bowel regimen [] colace [x] senna [X] other: Miralax, dulcolax  LBM: PTA    ENDO:   Goal euglycemia (-180)    HEME/ONC:  S/p 2u pRBC, repeat CBC tonight pending.   Repeat CBC in AM.  SCDs and SQL chemoppx  RUE Duplex Venous and Arterial because of ecchymosis around previous IV and A-line sites.    ID:  afebrile   Ancef while HMV drains in.    CODE STATUS:  [X] Full Code [] DNR [] DNI [] Palliative/Comfort Care    DISPOSITION:  floor

## 2024-11-23 NOTE — SWALLOW BEDSIDE ASSESSMENT ADULT - DATE
23-Nov-2024
22-Nov-2024
80yo F w/ right foot exposed hardware 2nd toe no original DOS as pt is nonverbal  ·	Pt seen & evaluated  ·	Previously seen by attending  ·	Attempted screw retrieval with sterile hemostat, unable to retrieve  ·	Will need screwdriver to attempt removal, no immediate concern for infectious process all XR changes likely post surgical or chronic in nature, nothing to suggest acute infection  ·	Will get screwdriver from [employer] headed to attempt retrieval, unsure original company no way to gather information, may take 1-2 days  ·	d/w attending

## 2024-11-23 NOTE — SWALLOW BEDSIDE ASSESSMENT ADULT - SWALLOW EVAL: DIAGNOSIS
79F, admitted for T11-pelvis fusion to address LBP and coronal scoliosis. 79F, admitted for T11-pelvis fusion to address LBP and coronal scoliosis. Patient presenting with overtly functional paul-pharyngeal swallow mechanism for return to regular solid and thin liquid diet. Swallow sequence notable for timely mastication and bolus transport, timely initiation of pharyngeal swallow trigger, and present hyo-laryngeal excursion upon palpation. No overt s/s of laryngeal penetration/aspiration or changes to vocal quality noted across exhaustive PO trials. Patient expressed preference for easy to chew diet at this time and can be upgraded to regular solids as per patient request.

## 2024-11-23 NOTE — PROGRESS NOTE ADULT - SUBJECTIVE AND OBJECTIVE BOX
NSICU PROGRESS NOTE     HPI  79F, admitted for T11-pelvis fusion to address LBP and coronal scoliosis. PMH AFib/flutter (Eliquis stopped for OR), HTN/HLD.     ICU COURSE:  11/20: admitted to NSCU s/p OR  11/21: extubated, continue with dex 4 q6 for airway edema,  PCA discontinued, TSLO brace ordered   11/22: received 2u pRBC for Hgb drop to 6.3 from 8.0    12h events  11/23 naeon    EXAMINATION:  General: No acute distress  HEENT: Anicteric sclerae  Cardiac: [id number]  Lungs: Clear  Abdomen: Soft, non-tender, +BS  Extremities: RUE ecchymotic around previous IV and A-line site.  Skin/Incision Site: Clean, dry and intact  Neurologic: intubated, FC, LLE 5/5, RHF 4-/5 (pain-limited), RKE 4/5, RDF 4/5, RPF 5/5.    VITALS:   Vital Signs Last 24 Hrs  T(C): 36.7 (23 Nov 2024 07:00), Max: 36.8 (22 Nov 2024 15:00)  T(F): 98.1 (23 Nov 2024 07:00), Max: 98.2 (22 Nov 2024 15:00)  HR: 67 (23 Nov 2024 07:00) (60 - 115)  BP: 106/55 (23 Nov 2024 07:00) (97/53 - 140/63)  BP(mean): 78 (23 Nov 2024 07:00) (70 - 90)  RR: 22 (23 Nov 2024 07:00) (11 - 32)  SpO2: 94% (23 Nov 2024 07:00) (92% - 98%)    Parameters below as of 23 Nov 2024 07:00  Patient On (Oxygen Delivery Method): nasal cannula  O2 Flow (L/min): 2      Drips     Respiratory:        LABS:                        11.0   13.07 )-----------( 179      ( 23 Nov 2024 06:08 )             33.1     11-23    139  |  102  |  21  ----------------------------<  114[H]  4.0   |  27  |  0.67      CAPILLARY BLOOD GLUCOSE          I&O's Summary    22 Nov 2024 07:01  -  23 Nov 2024 07:00  --------------------------------------------------------  IN: 2154.8 mL / OUT: 1195 mL / NET: 959.8 mL    23 Nov 2024 07:01  -  23 Nov 2024 08:59  --------------------------------------------------------  IN: 45 mL / OUT: 45 mL / NET: 0 mL        Imaging   CXR     EKG     MEDICATION LEVELS:     IVF FLUIDS/MEDICATIONS:   MEDICATIONS  (STANDING):  bisacodyl Suppository 10 milliGRAM(s) Rectal daily  ceFAZolin   IVPB 2000 milliGRAM(s) IV Intermittent every 8 hours  chlorhexidine 4% Liquid 1 Application(s) Topical <User Schedule>  enoxaparin Injectable 40 milliGRAM(s) SubCutaneous <User Schedule>  gabapentin Solution 300 milliGRAM(s) Oral three times a day  methocarbamol 750 milliGRAM(s) Oral every 8 hours  metoprolol tartrate 75 milliGRAM(s) Oral two times a day  pantoprazole   Suspension 40 milliGRAM(s) Oral before breakfast  polyethylene glycol 3350 17 Gram(s) Oral two times a day  rosuvastatin 10 milliGRAM(s) Oral at bedtime  senna 2 Tablet(s) Oral at bedtime  triamterene 37.5 mG/hydrochlorothiazide 25 mG Tablet 1 Tablet(s) Oral daily    MEDICATIONS  (PRN):  acetaminophen   Oral Liquid .. 650 milliGRAM(s) Oral every 6 hours PRN Temp greater or equal to 38C (100.4F), Mild Pain (1 - 3)  oxyCODONE    Solution 5 milliGRAM(s) Oral every 4 hours PRN Moderate Pain (4 - 6)  oxyCODONE    Solution 10 milliGRAM(s) Oral every 4 hours PRN Severe Pain (7 - 10)

## 2024-11-24 RX ADMIN — ACETAMINOPHEN 500MG 650 MILLIGRAM(S): 500 TABLET, COATED ORAL at 19:21

## 2024-11-24 RX ADMIN — Medication 100 MILLIGRAM(S): at 21:32

## 2024-11-24 RX ADMIN — POLYETHYLENE GLYCOL 3350 17 GRAM(S): 17 POWDER, FOR SOLUTION ORAL at 16:51

## 2024-11-24 RX ADMIN — POLYETHYLENE GLYCOL 3350 17 GRAM(S): 17 POWDER, FOR SOLUTION ORAL at 05:49

## 2024-11-24 RX ADMIN — ACETAMINOPHEN 500MG 650 MILLIGRAM(S): 500 TABLET, COATED ORAL at 09:39

## 2024-11-24 RX ADMIN — METHOCARBAMOL 750 MILLIGRAM(S): 500 TABLET, FILM COATED ORAL at 05:50

## 2024-11-24 RX ADMIN — Medication 100 MILLIGRAM(S): at 05:49

## 2024-11-24 RX ADMIN — Medication 100 MILLIGRAM(S): at 13:06

## 2024-11-24 RX ADMIN — ACETAMINOPHEN 500MG 650 MILLIGRAM(S): 500 TABLET, COATED ORAL at 18:51

## 2024-11-24 RX ADMIN — GABAPENTIN 300 MILLIGRAM(S): 300 CAPSULE ORAL at 13:06

## 2024-11-24 RX ADMIN — ENOXAPARIN SODIUM 40 MILLIGRAM(S): 30 INJECTION SUBCUTANEOUS at 16:51

## 2024-11-24 RX ADMIN — TRIAMTERENE AND HYDROCHLOROTHIAZIDE 1 TABLET(S): 25; 37.5 CAPSULE ORAL at 05:50

## 2024-11-24 RX ADMIN — ACETAMINOPHEN 500MG 650 MILLIGRAM(S): 500 TABLET, COATED ORAL at 10:10

## 2024-11-24 RX ADMIN — Medication 10 MILLIGRAM(S): at 13:07

## 2024-11-24 RX ADMIN — GABAPENTIN 300 MILLIGRAM(S): 300 CAPSULE ORAL at 21:30

## 2024-11-24 RX ADMIN — Medication 2 TABLET(S): at 21:30

## 2024-11-24 RX ADMIN — METOPROLOL TARTRATE 75 MILLIGRAM(S): 100 TABLET, FILM COATED ORAL at 05:50

## 2024-11-24 RX ADMIN — PANTOPRAZOLE SODIUM 40 MILLIGRAM(S): 40 TABLET, DELAYED RELEASE ORAL at 05:50

## 2024-11-24 RX ADMIN — GABAPENTIN 300 MILLIGRAM(S): 300 CAPSULE ORAL at 05:51

## 2024-11-24 RX ADMIN — METHOCARBAMOL 750 MILLIGRAM(S): 500 TABLET, FILM COATED ORAL at 21:31

## 2024-11-24 RX ADMIN — ROSUVASTATIN CALCIUM 10 MILLIGRAM(S): 5 TABLET, FILM COATED ORAL at 21:32

## 2024-11-24 RX ADMIN — METHOCARBAMOL 750 MILLIGRAM(S): 500 TABLET, FILM COATED ORAL at 13:06

## 2024-11-24 NOTE — PROGRESS NOTE ADULT - ASSESSMENT
pending AR, PM&R-saw  TLSO when OOB  Standing scoli films when tolerates  Monitor drain outputs, will possibly remove L deep drain if confirmed with Dr. Allen

## 2024-11-25 PROCEDURE — 93971 EXTREMITY STUDY: CPT | Mod: 26,LT

## 2024-11-25 PROCEDURE — 93280 PM DEVICE PROGR EVAL DUAL: CPT | Mod: 26

## 2024-11-25 PROCEDURE — 93286 PERI-PX EVAL PM/LDLS PM IP: CPT | Mod: 26,76,59

## 2024-11-25 PROCEDURE — 72146 MRI CHEST SPINE W/O DYE: CPT | Mod: 26

## 2024-11-25 PROCEDURE — 72148 MRI LUMBAR SPINE W/O DYE: CPT | Mod: 26

## 2024-11-25 PROCEDURE — 99232 SBSQ HOSP IP/OBS MODERATE 35: CPT

## 2024-11-25 PROCEDURE — 71045 X-RAY EXAM CHEST 1 VIEW: CPT | Mod: 26

## 2024-11-25 RX ORDER — METOPROLOL TARTRATE 100 MG/1
50 TABLET, FILM COATED ORAL
Refills: 0 | Status: DISCONTINUED | OUTPATIENT
Start: 2024-11-25 | End: 2024-12-02

## 2024-11-25 RX ORDER — GABAPENTIN 300 MG/1
300 CAPSULE ORAL THREE TIMES A DAY
Refills: 0 | Status: DISCONTINUED | OUTPATIENT
Start: 2024-11-25 | End: 2024-11-27

## 2024-11-25 RX ADMIN — GABAPENTIN 300 MILLIGRAM(S): 300 CAPSULE ORAL at 05:54

## 2024-11-25 RX ADMIN — GABAPENTIN 300 MILLIGRAM(S): 300 CAPSULE ORAL at 14:05

## 2024-11-25 RX ADMIN — METHOCARBAMOL 750 MILLIGRAM(S): 500 TABLET, FILM COATED ORAL at 14:06

## 2024-11-25 RX ADMIN — POLYETHYLENE GLYCOL 3350 17 GRAM(S): 17 POWDER, FOR SOLUTION ORAL at 17:25

## 2024-11-25 RX ADMIN — ENOXAPARIN SODIUM 40 MILLIGRAM(S): 30 INJECTION SUBCUTANEOUS at 17:25

## 2024-11-25 RX ADMIN — POLYETHYLENE GLYCOL 3350 17 GRAM(S): 17 POWDER, FOR SOLUTION ORAL at 05:55

## 2024-11-25 RX ADMIN — METOPROLOL TARTRATE 75 MILLIGRAM(S): 100 TABLET, FILM COATED ORAL at 05:56

## 2024-11-25 RX ADMIN — ACETAMINOPHEN 500MG 650 MILLIGRAM(S): 500 TABLET, COATED ORAL at 14:13

## 2024-11-25 RX ADMIN — ROSUVASTATIN CALCIUM 10 MILLIGRAM(S): 5 TABLET, FILM COATED ORAL at 22:16

## 2024-11-25 RX ADMIN — ACETAMINOPHEN 500MG 650 MILLIGRAM(S): 500 TABLET, COATED ORAL at 11:38

## 2024-11-25 RX ADMIN — ACETAMINOPHEN 500MG 650 MILLIGRAM(S): 500 TABLET, COATED ORAL at 20:45

## 2024-11-25 RX ADMIN — ACETAMINOPHEN 500MG 650 MILLIGRAM(S): 500 TABLET, COATED ORAL at 05:59

## 2024-11-25 RX ADMIN — GABAPENTIN 300 MILLIGRAM(S): 300 CAPSULE ORAL at 22:16

## 2024-11-25 RX ADMIN — METOPROLOL TARTRATE 50 MILLIGRAM(S): 100 TABLET, FILM COATED ORAL at 17:26

## 2024-11-25 RX ADMIN — PANTOPRAZOLE SODIUM 40 MILLIGRAM(S): 40 TABLET, DELAYED RELEASE ORAL at 05:55

## 2024-11-25 RX ADMIN — TRIAMTERENE AND HYDROCHLOROTHIAZIDE 1 TABLET(S): 25; 37.5 CAPSULE ORAL at 05:55

## 2024-11-25 RX ADMIN — Medication 100 MILLIGRAM(S): at 05:56

## 2024-11-25 RX ADMIN — ACETAMINOPHEN 500MG 650 MILLIGRAM(S): 500 TABLET, COATED ORAL at 20:00

## 2024-11-25 RX ADMIN — METHOCARBAMOL 750 MILLIGRAM(S): 500 TABLET, FILM COATED ORAL at 05:54

## 2024-11-25 RX ADMIN — METHOCARBAMOL 750 MILLIGRAM(S): 500 TABLET, FILM COATED ORAL at 22:16

## 2024-11-25 RX ADMIN — Medication 100 MILLIGRAM(S): at 14:05

## 2024-11-25 NOTE — PROGRESS NOTE ADULT - SUBJECTIVE AND OBJECTIVE BOX
Patient seen for rehab follow up  CC:  scoliosis     patient continues to have RLE weakness, numbness         REVIEW OF SYSTEMS  Constitutional - No fever,  No fatigue  HEENT - No vertigo, No neck pain  Neurological - No headaches, No memory loss  Psychiatric - No depression, No anxiety    FUNCTIONAL PROGRESS  SLP  11/23  functional swallow  regular/thin liquids   patient preference easy to chew     PT 11/24  bed mobility min to mod assist   transfers mod assist x 2, RW  gait mod assist x 2, RW, 3-4 feet   +retropulsion, right knee buckling     OT 11/21  bed mobility mod assist x 2  transfers mod assist x 2  dressing max assist     VITALS  T(C): 37.2 (11-25-24 @ 09:36), Max: 37.3 (11-24-24 @ 22:15)  HR: 61 (11-25-24 @ 09:36) (61 - 78)  BP: 129/76 (11-25-24 @ 09:36) (100/60 - 131/77)  RR: 18 (11-25-24 @ 09:36) (18 - 18)  SpO2: 96% (11-25-24 @ 09:36) (91% - 98%)  Wt(kg): --    MEDICATIONS   acetaminophen     Tablet .. 650 milliGRAM(s) every 6 hours PRN  bisacodyl Suppository 10 milliGRAM(s) daily  ceFAZolin   IVPB 2000 milliGRAM(s) every 8 hours  enoxaparin Injectable 40 milliGRAM(s) <User Schedule>  gabapentin Solution 300 milliGRAM(s) three times a day  methocarbamol 750 milliGRAM(s) every 8 hours  metoprolol tartrate 75 milliGRAM(s) two times a day  pantoprazole   Suspension 40 milliGRAM(s) before breakfast  polyethylene glycol 3350 17 Gram(s) two times a day  rosuvastatin 10 milliGRAM(s) at bedtime  senna 2 Tablet(s) at bedtime  triamterene 37.5 mG/hydrochlorothiazide 25 mG Tablet 1 Tablet(s) daily      RECENT LABS - Reviewed        < from: Xray Spine Thoracolumbar Junction 2 Views (11.20.24 @ 17:41) >    IMPRESSION:  Localizing melissa superimposes inferior aspect of L3 vertebral body.    Slight grade 1 anterolisthesis of L4 and L5 and slight retrolistheses of   L1 on L2 and L2 on L3 without spondylolysis defects and with variable   disc narrowing also noted.    Partially visualized distal ends of cardiac pacing wire leads over   inferior heart shadow at superior image margin.    Also correlate with preoperative workup and intraoperative findings.    --- End of Report ---    < end of copied text >          ----------------------------------------------------------------------------------------  PHYSICAL EXAM  Constitutional - NAD, Comfortable, in bed   +drains  Chest - Breathing comfortably on room air   Cardiovascular - S1S2   Extremities - +LE edema, RUE ecchymosis   Neurologic Exam -    follows commands                 Cognitive - Awake, Alert, AAO to self, place, date, year, situation     Communication - Fluent, No dysarthria        Motor -                     LEFT    UE - ShAB 5/5, EF 5/5, EE 5/5, WE 5/5,  5/5                    RIGHT UE - ShAB 5/5, EF 5/5, EE 5/5, WE 5/5,  5/5                    LEFT    LE - HF 5/5, KE 5/5, DF 5/5, PF 5/5                    RIGHT LE - HF 2/5, KE 2/5, DF 3/5          Sensory - decreased RLE      Psychiatric - Mood stable, Affect WNL  ----------------------------------------------------------------------------------------  ASSESSMENT/PLAN  79yFemale h/o  with functional deficits after T11-pelvis fusion, L4-5, L5-S1 TLIFs, T12-L1, L1-2, L3-4 PCOs.   TLSO brace for OOB   anemia, s/p transfusion, H/H 11/33.1 on 11/23   bowel regimen   Pain - Tylenol, oxycodone, dilaudid, gabapentin, robaxin, seen by pain management   DVT PPX - SCDs lovenox   Rehab -    patient requires mod assist with mobility    continue bedside therapy while admitted to prevent secondary complications of immobility, bed mobility, transfer training, progressive ambulation, equipment evaluation, ADLs   OOB throughout the day with staff, OOB to chair with meals/3 hours daily        Recommend ACUTE inpatient rehabilitation for the functional deficits consisting of 3 hours of multidisciplinary intense therapy/day x 5 days/week x 2-4 weeks depending on progress at rehabilitation facility, 24 hour RN/daily PMR physician for comorbid medical management.      Patient will be able to participate in and benefit from intense rehabilitation therapies for 3 hours a day x 5 days/week to maximize independence.     Rehab recommendations are dependent on functional progress and participation with bedside therapy     Will continue to follow for ongoing rehab needs and recommendations.               35 minutes spent on total encounter  with chart review of PT/OT/SLP notes, exam, imaging, counseling and education on inpatient rehabilitation, coordination of care with rehab team and

## 2024-11-25 NOTE — PROGRESS NOTE ADULT - ASSESSMENT
79 year  old RHD with PMH of HTN, HLD, Afib atrial flutter in 2013 ( on Xarelto per patient surgeon advised to stop for 10 days last dose 11/10/24), h/o sick sinus syndrome/ haert block,  atrial flutter s/p cardiac ablation x1 ( 2016), PPM  dependent dual chamberfemale  with  c/o non radiating lower back pain for  many years, pain become more  troublesome for last 4 yrs  with  severe pain and lower extremity weakness of which sometimes worse in right side is worse . She tried all pain management modalities like NSAIDS, , physical therapy, epidural  injects and facet blocks with no relief at all".   Pt states she is having scoliosis which made symptoms worse with few  episodes of right side sciatic pain. Her pain increases with ambulation and movement and decreases with sitting. Worse pain is standing still. No recent trauma. S/p surgical consult & scheduled for T11-Pelvis posterior stabilization and fusion on 11/19/2024.    Post-op day # 5 s/p T11-Pelvis posterior stabilization and fusion on 11/19/2024.    PLAN:  NEURO:    CARDIOVASCULAR:  Hemodynamically stable    PULMONARY:  Keep O2 sat > 94%  Continue incentive spirometer    RENAL:  Voidind  IVL    GI:  Tolerating diet  Bowel regimen    HEME:  H/H stable    ID: Afebrile    ENDO:    DVT PROPHYLAXIS:  [x] Venodynes                                [x] Heparin/Lovenox    FALL RISK:  [x] Low Risk                                    [] Impulsive    pending AR, PM&R-saw  TLSO when OOB  Standing scoli films when tolerates  Monitor drain outputs, will possibly remove L deep drain if confirmed with Dr. Allen 79 year  old RHD with PMH of HTN, HLD, Afib atrial flutter in 2013 ( on Xarelto per patient surgeon advised to stop for 10 days last dose 11/10/24), h/o sick sinus syndrome/ haert block,  atrial flutter s/p cardiac ablation x1 ( 2016), PPM  dependent dual chamberfemale  with  c/o non radiating lower back pain for  many years, pain become more  troublesome for last 4 yrs  with  severe pain and lower extremity weakness of which sometimes worse in right side is worse . She tried all pain management modalities like NSAIDS, , physical therapy, epidural  injects and facet blocks with no relief at all".   Pt states she is having scoliosis which made symptoms worse with few  episodes of right side sciatic pain. Her pain increases with ambulation and movement and decreases with sitting. Worse pain is standing still. No recent trauma. S/p surgical consult & scheduled for T11-Pelvis posterior stabilization and fusion on 11/19/2024.    Post-op day # 5 s/p T11-Pelvis posterior stabilization and fusion on 11/19/2024.    PLAN:  NEURO:  c/w neuro checks q 4 hrs  Urgent thoracic and Lumbar MRI pend  Monitor drain outputs  C/w Gabapentin and Methocarbamol Tid  TLSO when OOB  PT/OT after MRI  Further plan as per MRI resultStanding scoli films when tolerates    CARDIOVASCULAR:  Hemodynamically stable  C/w Metoprolol and Triamterene    PULMONARY:  Keep O2 sat > 91%  Continue incentive spirometer    RENAL:  Voiding   IVL    GI:  Tolerating diet  Bowel regimen: Miralax/senna  PPX: Protonix  Last BM : PTA    HEME:  H/H stable 11/23    ID: Afebrile    ENDO: no issue    DVT PROPHYLAXIS:  [x] Venodynes                                [x] Heparin/Lovenox    FALL RISK:  [x] Low Risk                                    [] Impulsive    Dispo: pending AR once medically stable, PM&R-saw    Discussed with Dr Allen  [id number]

## 2024-11-25 NOTE — PROCEDURE NOTE - ADDITIONAL PROCEDURE DETAILS
Indication: Pre MRI reprogramming  - Normal sensing and pacing thresholds  - Stable lead impedances  - Changes Made: Patient placed in MRI sure scan mode  - Call post MRI to reprogram
Indication: Post MRI reprogramming  - Normal sensing and pacing thresholds  - Stable lead impedances  - Changes Made: MRI sure scan mode turned off - patient returned to pre MRI settings
Indication for interrogation: MRI    Presenting rhythm: Atrial pacing 60s    Measured data WNL, normal pacemaker function, NOT dependent    Stored data revealed episodes of pAT and pAF, burden 2.8% - known on Xarelto at home    AP: 76.1%, : 1%

## 2024-11-25 NOTE — PROGRESS NOTE ADULT - SUBJECTIVE AND OBJECTIVE BOX
79 year  old RHD with PMH of HTN, HLD, Afib atrial flutter in 2013 ( on Xarelto per patient surgeon advised to stop for 10 days last dose 11/10/24), h/o sick sinus syndrome/ haert block,  atrial flutter s/p cardiac ablation x1 ( 2016), PPM  dependent dual chamberfemale  with  c/o non radiating lower back pain for  many years, pain become more  troublesome for last 4 yrs  with  severe pain and lower extremity weakness of which sometimes worse in right side is worse . She tried all pain management modalities like NSAIDS, , physical therapy, epidural  injects and facet blocks with no relief at all".   Pt states she is having scoliosis which made symptoms worse with few  episodes of right side sciatic pain. Her pain increases with ambulation and movement and decreases with sitting. Worse pain is standing still. No recent trauma. S/p surgical consult & scheduled for T11-Pelvis posterior stabilization and fusion on 11/19/2024.    Post-op day # 5 T11-Pelvis posterior stabilization and fusion on 11/19/2024.    OVERNIGHT EVENTS: Patient c/o RLE pain overnight, this morning she c/o Rt anterior thigh pain which is worsened with movement, RLE is weaker on exam    Vital Signs Last 24 Hrs  T(C): 37.1 (25 Nov 2024 04:46), Max: 37.3 (24 Nov 2024 22:15)  T(F): 98.7 (25 Nov 2024 04:46), Max: 99.1 (24 Nov 2024 22:15)  HR: 73 (25 Nov 2024 04:46) (63 - 78)  BP: 129/71 (25 Nov 2024 04:46) (100/60 - 131/77)  BP(mean): --  RR: 18 (25 Nov 2024 04:46) (18 - 18)  SpO2: 97% (25 Nov 2024 04:46) (91% - 98%)    Parameters below as of 25 Nov 2024 04:46  Patient On (Oxygen Delivery Method): nasal cannula        I&O's Detail    24 Nov 2024 07:01  -  25 Nov 2024 07:00  --------------------------------------------------------  IN:    Oral Fluid: 120 mL  Total IN: 120 mL    OUT:    Drain (mL): 90 mL    Drain (mL): 20 mL    Drain (mL): 10 mL    Voided (mL): 2250 mL  Total OUT: 2370 mL    Total NET: -2250 mL        I&O's Summary    24 Nov 2024 07:01  -  25 Nov 2024 07:00  --------------------------------------------------------  IN: 120 mL / OUT: 2370 mL / NET: -2250 mL        PHYSICAL EXAM:  Neurological:    Motor exam:         [] Upper extremity                 D             B          T          WE       WF      HI                                               R         5/5        5/5        5/5       5/5     5/5       5/5                                               L          5/5        5/5        5/5       5/5     5/5       5/5         [] Lower extremity               HF            KF        KE         EHL        FHL                                               R     2/5        2/5       2/5      2/5        4+/5                                               L         2/5        5/5       5/5       5/5          5/5                                                 Sensation: [] intact to light touch  [] decreased:       Gait    Cardiovascular: Regular  Respiratory: Clear bilaterally  Gastrointestinal: Abd soft + BS non tender  Genitourinary:  Extremities: -C/C/E  Incision/Wound: Intact, all drains left in place: Rt superficial HMV 90CC, Rt dep HMV 10CC, Lt deep HMV 20cc    TUBES/LINES:  [] CVC  [] A-line  [] Lumbar Drain  [] Ventriculostomy  [] Other    DIET: Easy to chew  [] NPO  [] Mechanical  [] Tube feeds    LABS:      Drug Levels: [] N/A    CSF Analysis: [] N/A      Allergies    No Known Allergies    Intolerances      MEDICATIONS:  Antibiotics:  ceFAZolin   IVPB 2000 milliGRAM(s) IV Intermittent every 8 hours    Neuro:  acetaminophen     Tablet .. 650 milliGRAM(s) Oral every 6 hours PRN  gabapentin Solution 300 milliGRAM(s) Oral three times a day  methocarbamol 750 milliGRAM(s) Oral every 8 hours    Anticoagulation:  enoxaparin Injectable 40 milliGRAM(s) SubCutaneous <User Schedule>    OTHER:  bisacodyl Suppository 10 milliGRAM(s) Rectal daily  metoprolol tartrate 75 milliGRAM(s) Oral two times a day  pantoprazole   Suspension 40 milliGRAM(s) Oral before breakfast  polyethylene glycol 3350 17 Gram(s) Oral two times a day  rosuvastatin 10 milliGRAM(s) Oral at bedtime  senna 2 Tablet(s) Oral at bedtime  triamterene 37.5 mG/hydrochlorothiazide 25 mG Tablet 1 Tablet(s) Oral daily    IVF:    CULTURES:    RADIOLOGY & ADDITIONAL TESTS:             79 year  old RHD with PMH of HTN, HLD, Afib atrial flutter in 2013 ( on Xarelto per patient surgeon advised to stop for 10 days last dose 11/10/24), h/o sick sinus syndrome/ haert block,  atrial flutter s/p cardiac ablation x1 ( 2016), PPM  dependent dual chamberfemale  with  c/o non radiating lower back pain for  many years, pain become more  troublesome for last 4 yrs  with  severe pain and lower extremity weakness of which sometimes worse in right side is worse . She tried all pain management modalities like NSAIDS, , physical therapy, epidural  injects and facet blocks with no relief at all".   Pt states she is having scoliosis which made symptoms worse with few  episodes of right side sciatic pain. Her pain increases with ambulation and movement and decreases with sitting. Worse pain is standing still. No recent trauma. S/p surgical consult & scheduled for T11-Pelvis posterior stabilization and fusion on 11/19/2024.    Post-op day # 5 T11-Pelvis posterior stabilization and fusion on 11/19/2024.    OVERNIGHT EVENTS: Patient c/o RLE pain overnight, this morning she c/o Rt anterior thigh pain which is worsened with movement, RLE is weaker on exam    Vital Signs Last 24 Hrs  T(C): 37.1 (25 Nov 2024 04:46), Max: 37.3 (24 Nov 2024 22:15)  T(F): 98.7 (25 Nov 2024 04:46), Max: 99.1 (24 Nov 2024 22:15)  HR: 73 (25 Nov 2024 04:46) (63 - 78)  BP: 129/71 (25 Nov 2024 04:46) (100/60 - 131/77)  BP(mean): --  RR: 18 (25 Nov 2024 04:46) (18 - 18)  SpO2: 97% (25 Nov 2024 04:46) (91% - 98%)    Parameters below as of 25 Nov 2024 04:46  Patient On (Oxygen Delivery Method): nasal cannula        I&O's Detail    24 Nov 2024 07:01  -  25 Nov 2024 07:00  --------------------------------------------------------  IN:    Oral Fluid: 120 mL  Total IN: 120 mL    OUT:    Drain (mL): 90 mL    Drain (mL): 20 mL    Drain (mL): 10 mL    Voided (mL): 2250 mL  Total OUT: 2370 mL    Total NET: -2250 mL        I&O's Summary    24 Nov 2024 07:01  -  25 Nov 2024 07:00  --------------------------------------------------------  IN: 120 mL / OUT: 2370 mL / NET: -2250 mL        PHYSICAL EXAM:  Neurological:    Motor exam:         [x] Upper extremity                 D             B          T          WE       WF      HI                                               R         5/5        5/5        5/5       5/5     5/5       5/5                                               L          5/5        5/5        5/5       5/5     5/5       5/5         [x] Lower extremity               HF            KF        KE         EHL        FHL                                               R     2/5        2/5       2/5      2/5        4+/5                                               L         2/5        5/5       5/5       5/5          5/5                                                 Sensation: [] intact to light touch  [] decreased:       Gait    Cardiovascular: Regular  Respiratory: Clear bilaterally  Gastrointestinal: Abd soft + BS non tender  Genitourinary:  Extremities: -C/C/E  Incision/Wound: Intact, all drains left in place: Rt superficial HMV 90CC, Rt dep HMV 10CC, Lt deep HMV 20cc    TUBES/LINES:  [] CVC  [] A-line  [] Lumbar Drain  [] Ventriculostomy  [] Other    DIET: Easy to chew  [] NPO  [] Mechanical  [] Tube feeds    LABS:      Drug Levels: [] N/A    CSF Analysis: [] N/A      Allergies    No Known Allergies    Intolerances      MEDICATIONS:  Antibiotics:  ceFAZolin   IVPB 2000 milliGRAM(s) IV Intermittent every 8 hours    Neuro:  acetaminophen     Tablet .. 650 milliGRAM(s) Oral every 6 hours PRN  gabapentin Solution 300 milliGRAM(s) Oral three times a day  methocarbamol 750 milliGRAM(s) Oral every 8 hours    Anticoagulation:  enoxaparin Injectable 40 milliGRAM(s) SubCutaneous <User Schedule>    OTHER:  bisacodyl Suppository 10 milliGRAM(s) Rectal daily  metoprolol tartrate 75 milliGRAM(s) Oral two times a day  pantoprazole   Suspension 40 milliGRAM(s) Oral before breakfast  polyethylene glycol 3350 17 Gram(s) Oral two times a day  rosuvastatin 10 milliGRAM(s) Oral at bedtime  senna 2 Tablet(s) Oral at bedtime  triamterene 37.5 mG/hydrochlorothiazide 25 mG Tablet 1 Tablet(s) Oral daily    IVF:    CULTURES:    RADIOLOGY & ADDITIONAL TESTS:             79 year  old RHD with PMH of HTN, HLD, Afib atrial flutter in 2013 ( on Xarelto per patient surgeon advised to stop for 10 days last dose 11/10/24), h/o sick sinus syndrome/ haert block,  atrial flutter s/p cardiac ablation x1 ( 2016), PPM  dependent dual chamberfemale  with  c/o non radiating lower back pain for  many years, pain become more  troublesome for last 4 yrs  with  severe pain and lower extremity weakness of which sometimes worse in right side is worse . She tried all pain management modalities like NSAIDS, , physical therapy, epidural  injects and facet blocks with no relief at all".   Pt states she is having scoliosis which made symptoms worse with few  episodes of right side sciatic pain. Her pain increases with ambulation and movement and decreases with sitting. Worse pain is standing still. No recent trauma. S/p surgical consult & scheduled for T11-Pelvis posterior stabilization and fusion on 11/19/2024.    Post-op day # 5 T11-Pelvis posterior stabilization and fusion on 11/19/2024.    OVERNIGHT EVENTS: Patient c/o RLE pain overnight, this morning she c/o Rt anterior thigh pain which is worsened with movement, RLE is weaker on exam    Vital Signs Last 24 Hrs  T(C): 37.1 (25 Nov 2024 04:46), Max: 37.3 (24 Nov 2024 22:15)  T(F): 98.7 (25 Nov 2024 04:46), Max: 99.1 (24 Nov 2024 22:15)  HR: 73 (25 Nov 2024 04:46) (63 - 78)  BP: 129/71 (25 Nov 2024 04:46) (100/60 - 131/77)  BP(mean): --  RR: 18 (25 Nov 2024 04:46) (18 - 18)  SpO2: 97% (25 Nov 2024 04:46) (91% - 98%)    Parameters below as of 25 Nov 2024 04:46  Patient On (Oxygen Delivery Method): nasal cannula        I&O's Detail    24 Nov 2024 07:01  -  25 Nov 2024 07:00  --------------------------------------------------------  IN:    Oral Fluid: 120 mL  Total IN: 120 mL    OUT:    Drain (mL): 90 mL    Drain (mL): 20 mL    Drain (mL): 10 mL    Voided (mL): 2250 mL  Total OUT: 2370 mL    Total NET: -2250 mL        I&O's Summary    24 Nov 2024 07:01  -  25 Nov 2024 07:00  --------------------------------------------------------  IN: 120 mL / OUT: 2370 mL / NET: -2250 mL        PHYSICAL EXAM:  Neurological:    Motor exam:         [x] Upper extremity                 D             B          T          WE       WF      HI                                               R         5/5        5/5        5/5       5/5     5/5       5/5                                               L          5/5        5/5        5/5       5/5     5/5       5/5         [x] Lower extremity               HF            KF        KE         EHL        FHL                                               R     2/5        2/5       2/5      2/5        4+/5                                               L         2/5        5/5       5/5       5/5          5/5                                                 Sensation: [] intact to light touch  [] decreased:       Gait    Cardiovascular: Regular  Respiratory: Clear bilaterally  Gastrointestinal: Abd soft + BS non tender  Genitourinary:  Extremities: -C/C/E  Incision/Wound: Intact, all drains left in place: Rt superficial HMV 90CC, Rt dep HMV 10CC, Lt deep HMV 20cc  LUE + SWELLING, F/U Venous doppler pend    TUBES/LINES:  [] CVC  [] A-line  [] Lumbar Drain  [] Ventriculostomy  [] Other    DIET: Easy to chew  [] NPO  [] Mechanical  [] Tube feeds    LABS:      Drug Levels: [] N/A    CSF Analysis: [] N/A      Allergies    No Known Allergies    Intolerances      MEDICATIONS:  Antibiotics:  ceFAZolin   IVPB 2000 milliGRAM(s) IV Intermittent every 8 hours    Neuro:  acetaminophen     Tablet .. 650 milliGRAM(s) Oral every 6 hours PRN  gabapentin Solution 300 milliGRAM(s) Oral three times a day  methocarbamol 750 milliGRAM(s) Oral every 8 hours    Anticoagulation:  enoxaparin Injectable 40 milliGRAM(s) SubCutaneous <User Schedule>    OTHER:  bisacodyl Suppository 10 milliGRAM(s) Rectal daily  metoprolol tartrate 75 milliGRAM(s) Oral two times a day  pantoprazole   Suspension 40 milliGRAM(s) Oral before breakfast  polyethylene glycol 3350 17 Gram(s) Oral two times a day  rosuvastatin 10 milliGRAM(s) Oral at bedtime  senna 2 Tablet(s) Oral at bedtime  triamterene 37.5 mG/hydrochlorothiazide 25 mG Tablet 1 Tablet(s) Oral daily    IVF:    CULTURES:    RADIOLOGY & ADDITIONAL TESTS:

## 2024-11-25 NOTE — PROCEDURE NOTE - INTERROGATION NOTE: COMMENTS
Battery longevity estimated at 12.7 years

## 2024-11-26 RX ADMIN — METOPROLOL TARTRATE 50 MILLIGRAM(S): 100 TABLET, FILM COATED ORAL at 17:59

## 2024-11-26 RX ADMIN — ACETAMINOPHEN 500MG 650 MILLIGRAM(S): 500 TABLET, COATED ORAL at 19:15

## 2024-11-26 RX ADMIN — ACETAMINOPHEN 500MG 650 MILLIGRAM(S): 500 TABLET, COATED ORAL at 09:15

## 2024-11-26 RX ADMIN — PANTOPRAZOLE SODIUM 40 MILLIGRAM(S): 40 TABLET, DELAYED RELEASE ORAL at 05:22

## 2024-11-26 RX ADMIN — METHOCARBAMOL 750 MILLIGRAM(S): 500 TABLET, FILM COATED ORAL at 14:00

## 2024-11-26 RX ADMIN — METOPROLOL TARTRATE 50 MILLIGRAM(S): 100 TABLET, FILM COATED ORAL at 05:22

## 2024-11-26 RX ADMIN — METHOCARBAMOL 750 MILLIGRAM(S): 500 TABLET, FILM COATED ORAL at 05:21

## 2024-11-26 RX ADMIN — TRIAMTERENE AND HYDROCHLOROTHIAZIDE 1 TABLET(S): 25; 37.5 CAPSULE ORAL at 05:22

## 2024-11-26 RX ADMIN — GABAPENTIN 300 MILLIGRAM(S): 300 CAPSULE ORAL at 05:21

## 2024-11-26 RX ADMIN — GABAPENTIN 300 MILLIGRAM(S): 300 CAPSULE ORAL at 21:49

## 2024-11-26 RX ADMIN — ACETAMINOPHEN 500MG 650 MILLIGRAM(S): 500 TABLET, COATED ORAL at 18:02

## 2024-11-26 RX ADMIN — METHOCARBAMOL 750 MILLIGRAM(S): 500 TABLET, FILM COATED ORAL at 21:49

## 2024-11-26 RX ADMIN — ROSUVASTATIN CALCIUM 10 MILLIGRAM(S): 5 TABLET, FILM COATED ORAL at 21:49

## 2024-11-26 RX ADMIN — POLYETHYLENE GLYCOL 3350 17 GRAM(S): 17 POWDER, FOR SOLUTION ORAL at 17:59

## 2024-11-26 RX ADMIN — ENOXAPARIN SODIUM 40 MILLIGRAM(S): 30 INJECTION SUBCUTANEOUS at 18:00

## 2024-11-26 RX ADMIN — GABAPENTIN 300 MILLIGRAM(S): 300 CAPSULE ORAL at 14:00

## 2024-11-26 RX ADMIN — ACETAMINOPHEN 500MG 650 MILLIGRAM(S): 500 TABLET, COATED ORAL at 08:35

## 2024-11-26 NOTE — PROGRESS NOTE ADULT - SUBJECTIVE AND OBJECTIVE BOX
79 year  old RHD with PMH of HTN, HLD, Afib atrial flutter in 2013 ( on Xarelto per patient surgeon advised to stop for 10 days last dose 11/10/24), h/o sick sinus syndrome/ haert block,  atrial flutter s/p cardiac ablation x1 ( 2016), PPM  dependent dual chamberfemale  with  c/o non radiating lower back pain for  many years, pain become more  troublesome for last 4 yrs  with  severe pain and lower extremity weakness of which sometimes worse in right side is worse . She tried all pain management modalities like NSAIDS, , physical therapy, epidural  injects and facet blocks with no relief at all".   Pt states she is having scoliosis which made symptoms worse with few  episodes of right side sciatic pain. Her pain increases with ambulation and movement and decreases with sitting. Worse pain is standing still. No recent trauma. S/p surgical consult & scheduled for T11-Pelvis posterior stabilization and fusion on 11/19/2024.    Post-op day # 6 s/p T11-Pelvis posterior stabilization and fusion on 11/19/2024.    OVERNIGHT EVENTS: no acute event    Vital Signs Last 24 Hrs  T(C): 36.7 (26 Nov 2024 05:00), Max: 37.4 (25 Nov 2024 21:11)  T(F): 98.1 (26 Nov 2024 05:00), Max: 99.3 (25 Nov 2024 21:11)  HR: 83 (26 Nov 2024 05:00) (61 - 83)  BP: 133/75 (26 Nov 2024 05:00) (117/72 - 147/71)  BP(mean): --  RR: 18 (26 Nov 2024 05:00) (18 - 18)  SpO2: 92% (26 Nov 2024 05:00) (91% - 96%)    Parameters below as of 26 Nov 2024 05:00  Patient On (Oxygen Delivery Method): room air        I&O's Detail    25 Nov 2024 07:01  -  26 Nov 2024 07:00  --------------------------------------------------------  IN:    Oral Fluid: 720 mL  Total IN: 720 mL    OUT:    Drain (mL): 5 mL    Drain (mL): 10 mL    Drain (mL): 10 mL    Voided (mL): 700 mL  Total OUT: 725 mL    Total NET: -5 mL        I&O's Summary    25 Nov 2024 07:01  -  26 Nov 2024 07:00  --------------------------------------------------------  IN: 720 mL / OUT: 725 mL / NET: -5 mL        PHYSICAL EXAM:  Neurological:    Motor exam:         [x] Upper extremity                 D             B          T          WE       WF      HI                                               R         5/5        5/5        5/5       5/5     5/5       5/5                                               L          5/5        5/5        5/5       5/5     5/5       5/5         [x] Lower extremity               HF            KF        KE         EHL        FHL                                               R      3/5        4/5       4/5       5/5      4/5                                               L       3//5        5/5       5/5       5/5          5/5                                                   Sensation: [x] intact to light touch  [] decreased:       Gait    Cardiovascular: Regular  Respiratory: Clear bilaterally  Gastrointestinal: Abd soft + BS non tender  Genitourinary:  Extremities: -C/C/E  Incision/Wound: Intact, Lt deep HMV 10cc+, Rt deep HMV 5+cc, Rt superficial HMV 10cc+    TUBES/LINES:  [] CVC  [] A-line  [] Lumbar Drain  [] Ventriculostomy  [] Other    DIET: Esy to chew  [] NPO  [] Mechanical  [] Tube feeds    LABS:                  CAPILLARY BLOOD GLUCOSE              Drug Levels: [] N/A    CSF Analysis: [] N/A      Allergies    No Known Allergies    Intolerances      MEDICATIONS:  Antibiotics:    Neuro:  acetaminophen     Tablet .. 650 milliGRAM(s) Oral every 6 hours PRN  gabapentin 300 milliGRAM(s) Oral three times a day  methocarbamol 750 milliGRAM(s) Oral every 8 hours    Anticoagulation:  enoxaparin Injectable 40 milliGRAM(s) SubCutaneous <User Schedule>    OTHER:  bisacodyl Suppository 10 milliGRAM(s) Rectal daily  metoprolol tartrate 50 milliGRAM(s) Oral two times a day  pantoprazole   Suspension 40 milliGRAM(s) Oral before breakfast  polyethylene glycol 3350 17 Gram(s) Oral two times a day  rosuvastatin 10 milliGRAM(s) Oral at bedtime  senna 2 Tablet(s) Oral at bedtime  triamterene 37.5 mG/hydrochlorothiazide 25 mG Tablet 1 Tablet(s) Oral daily    IVF:    CULTURES:    RADIOLOGY & ADDITIONAL TESTS:

## 2024-11-26 NOTE — PROGRESS NOTE ADULT - ASSESSMENT
79 year  old RHD with PMH of HTN, HLD, Afib atrial flutter in 2013 ( on Xarelto per patient surgeon advised to stop for 10 days last dose 11/10/24), h/o sick sinus syndrome/ haert block,  atrial flutter s/p cardiac ablation x1 ( 2016), PPM  dependent dual chamberfemale  with  c/o non radiating lower back pain for  many years, pain become more  troublesome for last 4 yrs  with  severe pain and lower extremity weakness of which sometimes worse in right side is worse . She tried all pain management modalities like NSAIDS, , physical therapy, epidural  injects and facet blocks with no relief at all".   Pt states she is having scoliosis which made symptoms worse with few  episodes of right side sciatic pain. Her pain increases with ambulation and movement and decreases with sitting. Worse pain is standing still. No recent trauma. S/p surgical consult & scheduled for T11-Pelvis posterior stabilization and fusion on 11/19/2024.    Procedure s/p T11-Pelvis posterior stabilization and fusion on 11/19/2024.    PLAN:  NEURO:  c/w neuro checks q 4 hrs  11/25 Urgent thoracic and Lumbar MRI reviewed, stable  Monitor drain output, will d/c once cleared by NSU  C/w Gabapentin and Methocarbamol Tid  TLSO when OOB  PT/OT after MRI  Standing scoli films when tolerates  Await Acute rehab    CARDIOVASCULAR:  Hemodynamically stable  C/w Metoprolol and Triamterene    PULMONARY:  Keep O2 sat > 91%  Continue incentive spirometer    RENAL:  Voiding   IVL    GI:  Tolerating diet  Bowel regimen: Miralax/senna  PPX: Protonix  Last BM :    HEME:  H/H stable 11/23    ID: Afebrile    ENDO: no issue    DVT PROPHYLAXIS:  [x] Venodynes                                [x] Heparin/Lovenox    FALL RISK:  [x] Low Risk                                    [] Impulsive    Dispo: pending AR once medically stable, PM&R-saw    Will discuss with Dr Allen  [phone]     79 year  old RHD with PMH of HTN, HLD, Afib atrial flutter in 2013 ( on Xarelto per patient surgeon advised to stop for 10 days last dose 11/10/24), h/o sick sinus syndrome/ haert block,  atrial flutter s/p cardiac ablation x1 ( 2016), PPM  dependent dual chamberfemale  with  c/o non radiating lower back pain for  many years, pain become more  troublesome for last 4 yrs  with  severe pain and lower extremity weakness of which sometimes worse in right side is worse . She tried all pain management modalities like NSAIDS, , physical therapy, epidural  injects and facet blocks with no relief at all".   Pt states she is having scoliosis which made symptoms worse with few  episodes of right side sciatic pain. Her pain increases with ambulation and movement and decreases with sitting. Worse pain is standing still. No recent trauma. S/p surgical consult & scheduled for T11-Pelvis posterior stabilization and fusion on 11/19/2024.    Procedure s/p T11-Pelvis posterior stabilization and fusion on 11/19/2024.    PLAN:  NEURO:  c/w neuro checks q 4 hrs  11/25 Urgent thoracic and Lumbar MRI reviewed, stable  Monitor drain output, will d/c once cleared by NSU  C/w Gabapentin and Methocarbamol Tid  TLSO when OOB  PT/OT after MRI  Standing scoli films when tolerates  Await Acute rehab    CARDIOVASCULAR:  Hemodynamically stable  C/w Metoprolol and Triamterene    PULMONARY:  Keep O2 sat > 91%  Continue incentive spirometer    RENAL:  Voiding   IVL    GI:  Tolerating diet  Bowel regimen: Miralax/senna  PPX: Protonix  Last BM :    HEME:  11/23 H/H stable     ID: Afebrile    ENDO: no issue    DVT PROPHYLAXIS:  [x] Venodynes                                [x] Heparin/Lovenox    FALL RISK:  [x] Low Risk                                    [] Impulsive    Dispo: pending AR once medically stable, PM&R following    Will discuss with Dr Allen  [phone]

## 2024-11-27 LAB
ANION GAP SERPL CALC-SCNC: 12 MMOL/L — SIGNIFICANT CHANGE UP (ref 5–17)
BUN SERPL-MCNC: 12 MG/DL — SIGNIFICANT CHANGE UP (ref 7–23)
CALCIUM SERPL-MCNC: 8.6 MG/DL — SIGNIFICANT CHANGE UP (ref 8.4–10.5)
CHLORIDE SERPL-SCNC: 98 MMOL/L — SIGNIFICANT CHANGE UP (ref 96–108)
CO2 SERPL-SCNC: 26 MMOL/L — SIGNIFICANT CHANGE UP (ref 22–31)
CREAT SERPL-MCNC: 0.56 MG/DL — SIGNIFICANT CHANGE UP (ref 0.5–1.3)
EGFR: 93 ML/MIN/1.73M2 — SIGNIFICANT CHANGE UP
GLUCOSE SERPL-MCNC: 108 MG/DL — HIGH (ref 70–99)
HCT VFR BLD CALC: 32.3 % — LOW (ref 34.5–45)
HGB BLD-MCNC: 10.8 G/DL — LOW (ref 11.5–15.5)
MCHC RBC-ENTMCNC: 29.2 PG — SIGNIFICANT CHANGE UP (ref 27–34)
MCHC RBC-ENTMCNC: 33.4 G/DL — SIGNIFICANT CHANGE UP (ref 32–36)
MCV RBC AUTO: 87.3 FL — SIGNIFICANT CHANGE UP (ref 80–100)
NRBC # BLD: 0 /100 WBCS — SIGNIFICANT CHANGE UP (ref 0–0)
PLATELET # BLD AUTO: 242 K/UL — SIGNIFICANT CHANGE UP (ref 150–400)
POTASSIUM SERPL-MCNC: 3.8 MMOL/L — SIGNIFICANT CHANGE UP (ref 3.5–5.3)
POTASSIUM SERPL-SCNC: 3.8 MMOL/L — SIGNIFICANT CHANGE UP (ref 3.5–5.3)
RBC # BLD: 3.7 M/UL — LOW (ref 3.8–5.2)
RBC # FLD: 15.9 % — HIGH (ref 10.3–14.5)
SODIUM SERPL-SCNC: 136 MMOL/L — SIGNIFICANT CHANGE UP (ref 135–145)
WBC # BLD: 8.46 K/UL — SIGNIFICANT CHANGE UP (ref 3.8–10.5)
WBC # FLD AUTO: 8.46 K/UL — SIGNIFICANT CHANGE UP (ref 3.8–10.5)

## 2024-11-27 RX ORDER — GABAPENTIN 300 MG/1
400 CAPSULE ORAL EVERY 8 HOURS
Refills: 0 | Status: DISCONTINUED | OUTPATIENT
Start: 2024-11-27 | End: 2024-12-01

## 2024-11-27 RX ORDER — BACITRACIN ZINC 500 UNIT/G
1 OINTMENT (GRAM) TOPICAL
Refills: 0 | Status: DISCONTINUED | OUTPATIENT
Start: 2024-11-27 | End: 2024-11-30

## 2024-11-27 RX ADMIN — ROSUVASTATIN CALCIUM 10 MILLIGRAM(S): 5 TABLET, FILM COATED ORAL at 22:01

## 2024-11-27 RX ADMIN — ACETAMINOPHEN 500MG 650 MILLIGRAM(S): 500 TABLET, COATED ORAL at 18:28

## 2024-11-27 RX ADMIN — ACETAMINOPHEN 500MG 650 MILLIGRAM(S): 500 TABLET, COATED ORAL at 09:31

## 2024-11-27 RX ADMIN — METOPROLOL TARTRATE 50 MILLIGRAM(S): 100 TABLET, FILM COATED ORAL at 05:36

## 2024-11-27 RX ADMIN — POLYETHYLENE GLYCOL 3350 17 GRAM(S): 17 POWDER, FOR SOLUTION ORAL at 05:36

## 2024-11-27 RX ADMIN — GABAPENTIN 300 MILLIGRAM(S): 300 CAPSULE ORAL at 13:40

## 2024-11-27 RX ADMIN — METOPROLOL TARTRATE 50 MILLIGRAM(S): 100 TABLET, FILM COATED ORAL at 18:28

## 2024-11-27 RX ADMIN — GABAPENTIN 300 MILLIGRAM(S): 300 CAPSULE ORAL at 05:36

## 2024-11-27 RX ADMIN — GABAPENTIN 400 MILLIGRAM(S): 300 CAPSULE ORAL at 22:01

## 2024-11-27 RX ADMIN — PANTOPRAZOLE SODIUM 40 MILLIGRAM(S): 40 TABLET, DELAYED RELEASE ORAL at 05:36

## 2024-11-27 RX ADMIN — TRIAMTERENE AND HYDROCHLOROTHIAZIDE 1 TABLET(S): 25; 37.5 CAPSULE ORAL at 05:37

## 2024-11-27 RX ADMIN — METHOCARBAMOL 750 MILLIGRAM(S): 500 TABLET, FILM COATED ORAL at 13:22

## 2024-11-27 RX ADMIN — ACETAMINOPHEN 500MG 650 MILLIGRAM(S): 500 TABLET, COATED ORAL at 10:15

## 2024-11-27 RX ADMIN — Medication 1 APPLICATION(S): at 18:27

## 2024-11-27 RX ADMIN — ENOXAPARIN SODIUM 40 MILLIGRAM(S): 30 INJECTION SUBCUTANEOUS at 18:28

## 2024-11-27 RX ADMIN — METHOCARBAMOL 750 MILLIGRAM(S): 500 TABLET, FILM COATED ORAL at 05:36

## 2024-11-27 RX ADMIN — ACETAMINOPHEN 500MG 650 MILLIGRAM(S): 500 TABLET, COATED ORAL at 02:56

## 2024-11-27 RX ADMIN — METHOCARBAMOL 750 MILLIGRAM(S): 500 TABLET, FILM COATED ORAL at 22:01

## 2024-11-27 RX ADMIN — ACETAMINOPHEN 500MG 650 MILLIGRAM(S): 500 TABLET, COATED ORAL at 02:11

## 2024-11-27 NOTE — PROGRESS NOTE ADULT - SUBJECTIVE AND OBJECTIVE BOX
79 year  old RHD with PMH of HTN, HLD, Afib atrial flutter in 2013 ( on Xarelto per patient surgeon advised to stop for 10 days last dose 11/10/24), h/o sick sinus syndrome/ haert block,  atrial flutter s/p cardiac ablation x1 ( 2016), PPM  dependent dual chamberfemale  with  c/o non radiating lower back pain for  many years, pain become more  troublesome for last 4 yrs  with  severe pain and lower extremity weakness of which sometimes worse in right side is worse . She tried all pain management modalities like NSAIDS, , physical therapy, epidural  injects and facet blocks with no relief at all".   Pt states she is having scoliosis which made symptoms worse with few  episodes of right side sciatic pain. Her pain increases with ambulation and movement and decreases with sitting. Worse pain is standing still. No recent trauma. S/p surgical consult & scheduled for T11-Pelvis posterior stabilization and fusion on 11/19/2024.    Vital Signs Last 24 Hrs  T(C): 36.6 (27 Nov 2024 09:05), Max: 37.1 (27 Nov 2024 05:38)  T(F): 97.9 (27 Nov 2024 09:05), Max: 98.8 (27 Nov 2024 05:38)  HR: 66 (27 Nov 2024 09:05) (64 - 84)  BP: 123/64 (27 Nov 2024 09:05) (123/64 - 159/88)  BP(mean): --  RR: 18 (27 Nov 2024 09:05) (18 - 18)  SpO2: 95% (27 Nov 2024 09:05) (94% - 96%)    Parameters below as of 27 Nov 2024 09:05  Patient On (Oxygen Delivery Method): room air    Drains: left deep 15 cc/24h; R deep 50cc/24h; R superficial 120cc/24h      PHYSICAL EXAM:  Neurological:    Motor exam:         [x] Upper extremity                 D             B          T          WE       WF      HI                                               R         5/5        5/5        5/5       5/5     5/5       5/5                                               L          5/5        5/5        5/5       5/5     5/5       5/5         [x] Lower extremity               HF            KF        KE         EHL        FHL                                               R      3/5        4/5       4/5       5/5      4/5                                               L       3//5        5/5       5/5       5/5          5/5                                                   Sensation: [x] intact to light touch  [] decreased:     Cardiovascular: Regular  Respiratory: Clear bilaterally  Gastrointestinal: Abd soft + BS non tender  Genitourinary:  Extremities: -C/C/E  Incision/Wound: Provena incisional vac removed and healing well, left HMVC removed; right drains x2     LABS:                          10.8   8.46  )-----------( 242      ( 27 Nov 2024 07:47 )             32.3   11-27    136  |  98  |  12  ----------------------------<  108[H]  3.8   |  26  |  0.56    Ca    8.6      27 Nov 2024 07:47    MEDICATIONS  (STANDING):  bisacodyl Suppository 10 milliGRAM(s) Rectal daily  enoxaparin Injectable 40 milliGRAM(s) SubCutaneous <User Schedule>  gabapentin 300 milliGRAM(s) Oral three times a day  methocarbamol 750 milliGRAM(s) Oral every 8 hours  metoprolol tartrate 50 milliGRAM(s) Oral two times a day  pantoprazole   Suspension 40 milliGRAM(s) Oral before breakfast  polyethylene glycol 3350 17 Gram(s) Oral two times a day  rosuvastatin 10 milliGRAM(s) Oral at bedtime  senna 2 Tablet(s) Oral at bedtime  triamterene 37.5 mG/hydrochlorothiazide 25 mG Tablet 1 Tablet(s) Oral daily    MEDICATIONS  (PRN):  acetaminophen     Tablet .. 650 milliGRAM(s) Oral every 6 hours PRN Temp greater or equal to 38C (100.4F), Mild Pain (1 - 3)    IMAGING:  < from: [facility] Duplex Upper Ext Vein Scan, Left (11.25.24 @ 13:05) >  IMPRESSION:    No evidence of left upper extremity deep venous thrombosis.    < end of copied text >        < from: MR Thoracic Spine No Cont (11.25.24 @ 12:27) >  IMPRESSION:    MRI THORACIC SPINE:  Redemonstration of posterior fusion of T11-S1, bilateral sacroiliac   fusion, and multilevel facetectomies.    No gross evidence for abnormal intrinsic cord signal.    No spinal cord compression.    No significant spinal canal stenosis in the thoracic region. Mild   multilevel neural foraminal narrowing lower thoracic region.    Expected postoperative edema throughout the posterior paraspinal soft   tissues from T9 through the inferior extent of the examination.    MRI LUMBAR SPINE:  Redemonstration of posterior fusion of T11-S1, bilateral sacroiliac   fusion, and multilevel facetectomies.    Mild multilevel mild spinal canal stenosis and neural foraminal narrowing   secondary to multilevel degenerative changes characterized by   degenerative disc disease and facet/ligamentous hypertrophy.    Expected postoperative edema throughout the posterior paraspinal soft   tissues at the levels of surgery.    < end of copied text >   79 year  old RHD with PMH of HTN, HLD, Afib atrial flutter in 2013 ( on Xarelto per patient surgeon advised to stop for 10 days last dose 11/10/24), h/o sick sinus syndrome/ haert block,  atrial flutter s/p cardiac ablation x1 ( 2016), PPM  dependent dual chamberfemale  with  c/o non radiating lower back pain for  many years, pain become more  troublesome for last 4 yrs  with  severe pain and lower extremity weakness of which sometimes worse in right side is worse . She tried all pain management modalities like NSAIDS, , physical therapy, epidural  injects and facet blocks with no relief at all".   Pt states she is having scoliosis which made symptoms worse with few  episodes of right side sciatic pain. Her pain increases with ambulation and movement and decreases with sitting. Worse pain is standing still. No recent trauma. S/p surgical consult & scheduled for T11-Pelvis posterior stabilization and fusion on 11/19/2024.    Vital Signs Last 24 Hrs  T(C): 36.6 (27 Nov 2024 09:05), Max: 37.1 (27 Nov 2024 05:38)  T(F): 97.9 (27 Nov 2024 09:05), Max: 98.8 (27 Nov 2024 05:38)  HR: 66 (27 Nov 2024 09:05) (64 - 84)  BP: 123/64 (27 Nov 2024 09:05) (123/64 - 159/88)  BP(mean): --  RR: 18 (27 Nov 2024 09:05) (18 - 18)  SpO2: 95% (27 Nov 2024 09:05) (94% - 96%)    Parameters below as of 27 Nov 2024 09:05  Patient On (Oxygen Delivery Method): room air    Drains: left deep 15 cc/24h; R deep 50cc/24h; R superficial 120cc/24h      PHYSICAL EXAM:  Neurological:    Motor exam:         [x] Upper extremity                 D             B          T          WE       WF      HI                                               R         5/5        5/5        5/5       5/5     5/5       5/5                                               L          5/5        5/5        5/5       5/5     5/5       5/5         [x] Lower extremity               HF            KF        KE         EHL        FHL                                               R      4/5        4/5       4/5       5/5      4/5                                               L       4//5        5/5       5/5       5/5          5/5                                                   Sensation: [x] intact to light touch  [] decreased:     Cardiovascular: Regular  Respiratory: Clear bilaterally  Gastrointestinal: Abd soft + BS non tender  Genitourinary: external catheter  Extremities: -C/C/E  Incision/Wound: Provena incisional vac removed and healing well, left HMVC removed; right drains x2     LABS:                          10.8   8.46  )-----------( 242      ( 27 Nov 2024 07:47 )             32.3   11-27    136  |  98  |  12  ----------------------------<  108[H]  3.8   |  26  |  0.56    Ca    8.6      27 Nov 2024 07:47    MEDICATIONS  (STANDING):  bisacodyl Suppository 10 milliGRAM(s) Rectal daily  enoxaparin Injectable 40 milliGRAM(s) SubCutaneous <User Schedule>  gabapentin 300 milliGRAM(s) Oral three times a day  methocarbamol 750 milliGRAM(s) Oral every 8 hours  metoprolol tartrate 50 milliGRAM(s) Oral two times a day  pantoprazole   Suspension 40 milliGRAM(s) Oral before breakfast  polyethylene glycol 3350 17 Gram(s) Oral two times a day  rosuvastatin 10 milliGRAM(s) Oral at bedtime  senna 2 Tablet(s) Oral at bedtime  triamterene 37.5 mG/hydrochlorothiazide 25 mG Tablet 1 Tablet(s) Oral daily    MEDICATIONS  (PRN):  acetaminophen     Tablet .. 650 milliGRAM(s) Oral every 6 hours PRN Temp greater or equal to 38C (100.4F), Mild Pain (1 - 3)    IMAGING:  < from: [facility] Duplex Upper Ext Vein Scan, Left (11.25.24 @ 13:05) >  IMPRESSION:    No evidence of left upper extremity deep venous thrombosis.    < end of copied text >        < from: MR Thoracic Spine No Cont (11.25.24 @ 12:27) >  IMPRESSION:    MRI THORACIC SPINE:  Redemonstration of posterior fusion of T11-S1, bilateral sacroiliac   fusion, and multilevel facetectomies.    No gross evidence for abnormal intrinsic cord signal.    No spinal cord compression.    No significant spinal canal stenosis in the thoracic region. Mild   multilevel neural foraminal narrowing lower thoracic region.    Expected postoperative edema throughout the posterior paraspinal soft   tissues from T9 through the inferior extent of the examination.    MRI LUMBAR SPINE:  Redemonstration of posterior fusion of T11-S1, bilateral sacroiliac   fusion, and multilevel facetectomies.    Mild multilevel mild spinal canal stenosis and neural foraminal narrowing   secondary to multilevel degenerative changes characterized by   degenerative disc disease and facet/ligamentous hypertrophy.    Expected postoperative edema throughout the posterior paraspinal soft   tissues at the levels of surgery.    < end of copied text >

## 2024-11-27 NOTE — PROGRESS NOTE ADULT - ASSESSMENT
79 year  old RHD with PMH of HTN, HLD, Afib atrial flutter in 2013 ( on Xarelto per patient surgeon advised to stop for 10 days last dose 11/10/24), h/o sick sinus syndrome/ haert block,  atrial flutter s/p cardiac ablation x1 ( 2016), PPM  dependent dual chamberfemale  with  c/o non radiating lower back pain for  many years, pain become more  troublesome for last 4 yrs  with  severe pain and lower extremity weakness of which sometimes worse in right side is worse . She tried all pain management modalities like NSAIDS, , physical therapy, epidural  injects and facet blocks with no relief at all".   Pt states she is having scoliosis which made symptoms worse with few  episodes of right side sciatic pain. Her pain increases with ambulation and movement and decreases with sitting. Worse pain is standing still. No recent trauma. S/p surgical consult & scheduled for T11-Pelvis posterior stabilization and fusion on 11/19/2024.    Procedure s/p T11-Pelvis posterior stabilization and fusion on 11/19/2024.    PLAN:  NEURO:  c/w neuro checks q 4 hrs  11/25 Urgent thoracic and Lumbar MRI reviewed, stable  Monitor remaining drain outputs  C/w Gabapentin and Methocarbamol TID  TLSO when OOB  PT/OT/ PMR acute rehab upon d/c  Standing scoli films when tolerates  Await Acute rehab    CARDIOVASCULAR:  Hemodynamically stable  C/w Metoprolol and Triamterene    PULMONARY:  Keep O2 sat > 91%  Continue incentive spirometer    RENAL:  Voiding   IVL    GI:  Tolerating diet  Bowel regimen: Miralax/senna  PPX: Protonix  Last BM : 11/25/24    HEME:  11/27 H/H stable     ID: Afebrile    ENDO: no issue    DVT PROPHYLAXIS:  [x] Venodynes                                [x] Heparin/Lovenox    FALL RISK:  [x] Low Risk                                    [] Impulsive    Dispo: PT/OT/PMR- acute rehab upon d/c once remaining drains removed    Will discuss with Dr Allen  [id number]     79 year  old RHD with PMH of HTN, HLD, Afib atrial flutter in 2013 ( on Xarelto per patient surgeon advised to stop for 10 days last dose 11/10/24), h/o sick sinus syndrome/ haert block,  atrial flutter s/p cardiac ablation x1 ( 2016), PPM  dependent dual chamberfemale  with  c/o non radiating lower back pain for  many years, pain become more  troublesome for last 4 yrs  with  severe pain and lower extremity weakness of which sometimes worse in right side is worse . She tried all pain management modalities like NSAIDS, , physical therapy, epidural  injects and facet blocks with no relief at all".   Pt states she is having scoliosis which made symptoms worse with few  episodes of right side sciatic pain. Her pain increases with ambulation and movement and decreases with sitting. Worse pain is standing still. No recent trauma. S/p surgical consult & scheduled for T11-Pelvis posterior stabilization and fusion on 11/19/2024.    Procedure s/p T11-Pelvis posterior stabilization and fusion on 11/19/2024.    PLAN:  NEURO:  c/w neuro checks q 4 hrs  11/25 Urgent thoracic and Lumbar MRI reviewed, stable  Monitor remaining drain outputs x2  C/w Methocarbamol TID and increase gabapentin to 400mg TID  TLSO when ambulating only now per Dr Allen as when sitting it is putting pressure on her flank/thighs causing pain  PT/OT/ PMR acute rehab upon d/c  Standing scoli films when tolerates  Await Acute rehab    CARDIOVASCULAR:  Hemodynamically stable  C/w Metoprolol and Triamterene    PULMONARY:  Keep O2 sat > 91%  Continue incentive spirometer    RENAL:  Voiding   IVL    GI:  Tolerating diet  Bowel regimen: Miralax/senna  PPX: Protonix  Last BM : 11/25/24    HEME:  11/27 H/H stable     ID: Afebrile    ENDO: no issue    DVT PROPHYLAXIS:  [x] Venodynes                                [x] Heparin/Lovenox    FALL RISK:  [x] Low Risk                                    [] Impulsive    Dispo: PT/OT/PMR- acute rehab upon d/c once remaining drains removed    discussed with Dr Allen  [id number]

## 2024-11-28 DIAGNOSIS — Z29.9 ENCOUNTER FOR PROPHYLACTIC MEASURES, UNSPECIFIED: ICD-10-CM

## 2024-11-28 DIAGNOSIS — I10 ESSENTIAL (PRIMARY) HYPERTENSION: ICD-10-CM

## 2024-11-28 DIAGNOSIS — I48.0 PAROXYSMAL ATRIAL FIBRILLATION: ICD-10-CM

## 2024-11-28 DIAGNOSIS — M41.9 SCOLIOSIS, UNSPECIFIED: ICD-10-CM

## 2024-11-28 PROCEDURE — 99222 1ST HOSP IP/OBS MODERATE 55: CPT

## 2024-11-28 RX ORDER — CEFAZOLIN SODIUM 10 G
2000 VIAL (EA) INJECTION EVERY 8 HOURS
Refills: 0 | Status: DISCONTINUED | OUTPATIENT
Start: 2024-11-28 | End: 2024-11-30

## 2024-11-28 RX ADMIN — GABAPENTIN 400 MILLIGRAM(S): 300 CAPSULE ORAL at 22:19

## 2024-11-28 RX ADMIN — ACETAMINOPHEN 500MG 650 MILLIGRAM(S): 500 TABLET, COATED ORAL at 22:18

## 2024-11-28 RX ADMIN — METHOCARBAMOL 750 MILLIGRAM(S): 500 TABLET, FILM COATED ORAL at 22:19

## 2024-11-28 RX ADMIN — Medication 100 MILLIGRAM(S): at 13:59

## 2024-11-28 RX ADMIN — ACETAMINOPHEN 500MG 650 MILLIGRAM(S): 500 TABLET, COATED ORAL at 16:07

## 2024-11-28 RX ADMIN — GABAPENTIN 400 MILLIGRAM(S): 300 CAPSULE ORAL at 05:25

## 2024-11-28 RX ADMIN — GABAPENTIN 400 MILLIGRAM(S): 300 CAPSULE ORAL at 13:59

## 2024-11-28 RX ADMIN — METOPROLOL TARTRATE 50 MILLIGRAM(S): 100 TABLET, FILM COATED ORAL at 17:25

## 2024-11-28 RX ADMIN — METOPROLOL TARTRATE 50 MILLIGRAM(S): 100 TABLET, FILM COATED ORAL at 05:26

## 2024-11-28 RX ADMIN — ACETAMINOPHEN 500MG 650 MILLIGRAM(S): 500 TABLET, COATED ORAL at 04:50

## 2024-11-28 RX ADMIN — ROSUVASTATIN CALCIUM 10 MILLIGRAM(S): 5 TABLET, FILM COATED ORAL at 22:19

## 2024-11-28 RX ADMIN — Medication 1 APPLICATION(S): at 05:26

## 2024-11-28 RX ADMIN — ACETAMINOPHEN 500MG 650 MILLIGRAM(S): 500 TABLET, COATED ORAL at 04:05

## 2024-11-28 RX ADMIN — PANTOPRAZOLE SODIUM 40 MILLIGRAM(S): 40 TABLET, DELAYED RELEASE ORAL at 05:27

## 2024-11-28 RX ADMIN — METHOCARBAMOL 750 MILLIGRAM(S): 500 TABLET, FILM COATED ORAL at 13:59

## 2024-11-28 RX ADMIN — Medication 100 MILLIGRAM(S): at 22:19

## 2024-11-28 RX ADMIN — Medication 1 APPLICATION(S): at 17:25

## 2024-11-28 RX ADMIN — METHOCARBAMOL 750 MILLIGRAM(S): 500 TABLET, FILM COATED ORAL at 05:25

## 2024-11-28 RX ADMIN — TRIAMTERENE AND HYDROCHLOROTHIAZIDE 1 TABLET(S): 25; 37.5 CAPSULE ORAL at 05:26

## 2024-11-28 RX ADMIN — ENOXAPARIN SODIUM 40 MILLIGRAM(S): 30 INJECTION SUBCUTANEOUS at 17:25

## 2024-11-28 RX ADMIN — ACETAMINOPHEN 500MG 650 MILLIGRAM(S): 500 TABLET, COATED ORAL at 16:37

## 2024-11-28 NOTE — CONSULT NOTE ADULT - SUBJECTIVE AND OBJECTIVE BOX
Patient is a 79y old  Female who presents with a chief complaint of for lumbar surgery (21 Nov 2024 18:18)    HPI:  79 year  old RHD  female  with  c/o non radiating lower back pain for  many years, pain become more  troublesome for last 4 yrs  with  severe pain and lower extremity weakness of which sometimes worse in right side is worse . She tried all pain management modalities like NSAIDS, , physical therapy, epidural  injects and facet blocks with no relief at all".   Pt states she is having scoliosis which made symptoms worse with few  episodes of right side sciatic pain. Her pain increases with ambulation and movement and decreases with sitting. Worse pain is standing still. No recent trauma. S/p surgical consult & scheduled for T11-Pelvis posterior stabilization and fusion on 11/19/2024.    PMH : HTN, HLD, Afib atrial flutter in 2013 ( on Xarelto per patient surgeon advised to stop for 10 days last dose 11/10/24), h/o sick sinus syndrome/ haert block,  atrial flutter s/p cardiac ablation x1 ( 2016), PPM  dependent dual chamber status since 2013( revised / replaced batter in 2023/last interrogation in 06/2024). history of breast augmentation  (silicon breast implants) & chronic lower back pain.    79yF was admitted on 11-20, s/p T11-pelvis fusion, patient seen today, denies pain, feels right leg is weaker.       REVIEW OF SYSTEMS  Denies chest pain, SOB, N/V, F/C, abdominal pain     VITALS  T(C): 36.6 (11-22-24 @ 11:00), Max: 47 (11-21-24 @ 12:00)  HR: 64 (11-22-24 @ 11:00) (60 - 69)  BP: --  RR: 22 (11-22-24 @ 11:00) (12 - 29)  SpO2: 98% (11-22-24 @ 11:00) (94% - 100%)  Wt(kg): --    PAST MEDICAL & SURGICAL HISTORY  HTN (hypertension)    HLD (hyperlipidemia)    Afib    H/O atrial flutter    Chronic back pain    H/O Hashimoto thyroiditis    H/O sick sinus syndrome    H/O heart block    Status cardiac pacemaker    H/O breast augmentation    H/O cardiac radiofrequency ablation        FUNCTIONAL HISTORY  Lives with daughter, 2 steps to enter home, works as an   Independent AMB and ADLs PTA     CURRENT FUNCTIONAL STATUS  PT  11/21  bed mobility mod assist x 2  transfers mod assist x 2  gait mod assist x 2, 5 feet   sitting EOB with CGA     OT 11/21  bed mobility mod assist x 2  transfers mod assist x 2  standing balance fair   dressing max assist     RECENT LABS/IMAGING  CBC Full  -  ( 22 Nov 2024 10:05 )  WBC Count : 9.86 K/uL  RBC Count : 2.11 M/uL  Hemoglobin : 6.3 g/dL  Hematocrit : 19.2 %  Platelet Count - Automated : 131 K/uL  Mean Cell Volume : 91.0 fl  Mean Cell Hemoglobin : 29.9 pg  Mean Cell Hemoglobin Concentration : 32.8 g/dL  Auto Neutrophil # : 8.85 K/uL  Auto Lymphocyte # : 0.36 K/uL  Auto Monocyte # : 0.64 K/uL  Auto Eosinophil # : 0.00 K/uL  Auto Basophil # : 0.00 K/uL  Auto Neutrophil % : 89.8 %  Auto Lymphocyte % : 3.7 %  Auto Monocyte % : 6.5 %  Auto Eosinophil % : 0.0 %  Auto Basophil % : 0.0 %    11-21    138  |  103  |  19  ----------------------------<  149[H]  4.0   |  24  |  0.65    Ca    8.1[L]      21 Nov 2024 23:32  Phos  3.2     11-21  Mg     1.9     11-21      Urinalysis Basic - ( 21 Nov 2024 23:32 )    Color: x / Appearance: x / SG: x / pH: x  Gluc: 149 mg/dL / Ketone: x  / Bili: x / Urobili: x   Blood: x / Protein: x / Nitrite: x   Leuk Esterase: x / RBC: x / WBC x   Sq Epi: x / Non Sq Epi: x / Bacteria: x    < from: Xray Spine Thoracolumbar Junction 2 Views (11.20.24 @ 17:41) >    IMPRESSION:  Localizing melissa superimposes inferior aspect of L3 vertebral body.    Slight grade 1 anterolisthesis of L4 and L5 and slight retrolistheses of   L1 on L2 and L2 on L3 without spondylolysis defects and with variable   disc narrowing also noted.    Partially visualized distal ends of cardiac pacing wire leads over   inferior heart shadow at superior image margin.    Also correlate with preoperative workup and intraoperative findings.    --- End of Report ---    < end of copied text >      ALLERGIES  No Known Allergies      MEDICATIONS   acetaminophen     Tablet .. 650 milliGRAM(s) Oral every 6 hours PRN  ceFAZolin   IVPB 2000 milliGRAM(s) IV Intermittent every 8 hours  enoxaparin Injectable 40 milliGRAM(s) SubCutaneous <User Schedule>  gabapentin 300 milliGRAM(s) Oral every 8 hours  methocarbamol 750 milliGRAM(s) Oral every 8 hours  metoprolol tartrate 75 milliGRAM(s) Oral two times a day  oxyCODONE    IR 5 milliGRAM(s) Oral every 4 hours PRN  oxyCODONE    IR 10 milliGRAM(s) Oral every 8 hours PRN  pantoprazole    Tablet 40 milliGRAM(s) Oral before breakfast  polyethylene glycol 3350 17 Gram(s) Oral two times a day  rosuvastatin 10 milliGRAM(s) Oral at bedtime  senna 2 Tablet(s) Oral at bedtime  triamterene 37.5 mG/hydrochlorothiazide 25 mG Tablet 1 Tablet(s) Oral daily      ----------------------------------------------------------------------------------------  PHYSICAL EXAM  Constitutional - NAD, Comfortable, in bed   +NGT +drains  Chest - Breathing comfortably with O2 by NC  Cardiovascular - S1S2   Extremities - +LE edema   Neurologic Exam -    follows commands                 Cognitive - Awake, Alert, AAO to self, place, date, year, situation     Communication - Fluent, No dysarthria        Motor -                     LEFT    UE - ShAB 5/5, EF 5/5, EE 5/5, WE 5/5,  5/5                    RIGHT UE - ShAB 5/5, EF 5/5, EE 5/5, WE 5/5,  5/5                    LEFT    LE - HF 5/5, KE 5/5, DF 5/5, PF 5/5                    RIGHT LE - 4/5        Sensory - Intact to LT     Psychiatric - Mood stable, Affect WNL  ----------------------------------------------------------------------------------------  ASSESSMENT/PLAN  79yFemale h/o  with functional deficits after T11-pelvis fusion, L4-5, L5-S1 TLIFs, T12-L1, L1-2, L3-4 PCOs.   TLSO brace for OOB   anemia, H/H 6.3/19.2, continue to monitor   +NGT, s/s eval pending   bowel regimen   Pain - Tylenol, oxycodone, dilaudid, gabapentin, robaxin, seen by pain management   DVT PPX - SCDs lovenox   Rehab -    patient requires mod assist with mobility    continue bedside therapy while admitted to prevent secondary complications of immobility, bed mobility, transfer training, progressive ambulation, equipment evaluation, ADLs   OOB throughout the day with staff, OOB to chair with meals/3 hours daily        Recommend ACUTE inpatient rehabilitation for the functional deficits consisting of 3 hours of multidisciplinary intense therapy/day x 5 days/week x 2-4 weeks depending on progress at rehabilitation facility, 24 hour RN/daily PMR physician for comorbid medical management.      Patient will be able to participate in and benefit from intense rehabilitation therapies for 3 hours a day x 5 days/week to maximize independence.     Rehab recommendations are dependent on functional progress and participation with bedside therapy      at this time, patient states she prefers discharge to home with home therapy, daughter is a PT, states will have home nursing  Will continue to follow for ongoing rehab needs and recommendations.       55 minutes spent, reviewing hospital course, therapy notes, relevant imaging, exam, education about inpatient rehabilitation, documentation, and discussion with  and rehabilitation team  
Chief Complaint:  Patient is a 79y old  Female who presents with a chief complaint of low back pain    HPI:  79 year  old RHD  female  with  c/o non radiating lower back pain for  many years, pain become more  troublesome for last 4 yrs  with  severe pain and lower extremity weakness of which sometimes worse in right side is worse . She tried all pain management modalities like NSAIDS, physical therapy, epidural  injects and facet blocks with no relief.   Pt states she has scoliosis which made symptoms worse with few  episodes of right side sciatic pain. Her pain increases with ambulation and movement and decreases with sitting. Worse pain is standing still.     PMH includes HTN, HLD, Afib atrial flutter in 2013 ( on Xarelto per patient surgeon advised to stop for 10 days last dose 11/10/24), h/o sick sinus syndrome/ heart block,  atrial flutter s/p cardiac ablation x1 ( 2016), PPM  dependent dual chamber status since 2013 (revised / replaced batter in 2023/last interrogation in 06/2024). breast augmentation (silicon breast implants) & chronic lower back pain.  S/P T11-pelvis instrumentation and fusion; L4-L5, L5-S1 TLIFs, T12-L1, L1-L2, L2-L3, L3-L4 PCO 11/20.     Current Pain Score: 5/10    Current out- patient pain regimen: none    Out Patient Pain Management provider: none    [facility] Prescription Monitoring Program: Reference #[id number]    PAST MEDICAL & SURGICAL HISTORY:  HTN (hypertension)      Afib      H/O atrial flutter      Chronic back pain      H/O Hashimoto thyroiditis      H/O sick sinus syndrome      H/O heart block      Status cardiac pacemaker      H/O breast augmentation      H/O cardiac radiofrequency ablation      SOCIAL HISTORY:  Tobacco Use: denies  Alcohol Use: denies  Recreational Marijuana: denies  Illicit Drug Use: denies    Opioid Risk Tool (ORT-OUD) Score: low      Allergies    No Known Allergies    Intolerances        REVIEW OF SYSTEMS:  CONSTITUTIONAL: No fever, weight loss, fatigue, falls  NEURO: No headaches, memory loss, tremors, dizziness or blurred vision  RESP: No shortness of breath, cough  CV: No chest pain, palpitations  GI: No abdominal pain, nausea, vomiting, diarrhea, constipation   : No urinary incontinence/retention, dysuria  MSK: No joint pain; + low back and B/L LE pain; no upper or lower motor strength weakness; no saddle anesthesia   SKIN: No itching, burning, rashes    PSYCHIATRIC: No depression, anxiety, mood swings, or difficulty sleeping      MEDICATIONS  (STANDING):  ceFAZolin   IVPB 2000 milliGRAM(s) IV Intermittent every 8 hours  chlorhexidine 4% Liquid 1 Application(s) Topical daily  chlorhexidine 4% Liquid 1 Application(s) Topical <User Schedule>  dexAMETHasone  Injectable 4 milliGRAM(s) IV Push every 6 hours  enoxaparin Injectable 40 milliGRAM(s) SubCutaneous <User Schedule>  gabapentin 300 milliGRAM(s) Oral every 8 hours  lactated ringers. 1000 milliLiter(s) (75 mL/Hr) IV Continuous <Continuous>  methocarbamol 750 milliGRAM(s) Oral every 8 hours  metoprolol tartrate 75 milliGRAM(s) Oral two times a day  niCARdipine Infusion 5 mG/Hr (25 mL/Hr) IV Continuous <Continuous>  pantoprazole    Tablet 40 milliGRAM(s) Oral before breakfast  polyethylene glycol 3350 17 Gram(s) Oral daily  rosuvastatin 10 milliGRAM(s) Oral at bedtime  senna 2 Tablet(s) Oral at bedtime  senna 1 Tablet(s) Oral daily  triamterene 37.5 mG/hydrochlorothiazide 25 mG Tablet 1 Tablet(s) Oral daily    MEDICATIONS  (PRN):  acetaminophen     Tablet .. 650 milliGRAM(s) Oral every 6 hours PRN Mild Pain (1 - 3)  HYDROmorphone  Injectable 0.5 milliGRAM(s) IV Push every 6 hours PRN breakthrough pain  ondansetron Injectable 4 milliGRAM(s) IV Push once PRN Nausea and/or Vomiting  oxyCODONE    IR 5 milliGRAM(s) Oral every 4 hours PRN Moderate Pain (4 - 6)  oxyCODONE    IR 10 milliGRAM(s) Oral every 8 hours PRN Severe Pain (7 - 10)  sodium chloride 0.9% lock flush 10 milliLiter(s) IV Push every 1 hour PRN Pre/post blood products, medications, blood draw, and to maintain line patency      PHYSICAL EXAM  GENERAL: seen at bedside; NAD; well developed, well groomed; no signs of toxicity  HEENT: head atraumatic, normocephalic; anicteric;  speech clear and fluent; O2 on  NEURO: A + O X 3; good concentration; Cranial Nerves II- XII intact; SILT  CARDIAC: RRR on cardiac monitor  GI: abdomen soft, non distended; NPO/ NGT; last BM yesterday  : indwelling bladder catheter  EXTREMITIES/ PV: MIRAMONTES; 2+ peripheral pulses; no cyanosis, edema or clubbing  MUSCULOSKELETAL: motor strength 5/5 B/L DF, PF, HF; independent ambulator preadmission  SKIN: no rashes, lesions; HMV x 3  PSYCH: affect normal; good eye contact; no signs of depression or anxiety  Vital Signs:  T(C): 36.4 (11-21-24 @ 14:00)  HR: 60 (11-21-24 @ 16:01)  BP: --  RR: 19 (11-21-24 @ 16:01)  SpO2: 100% (11-21-24 @ 16:01)    Pertinent labs/radiology:                        9.4    8.61  )-----------( 176      ( 20 Nov 2024 20:47 )             28.2   11-20    141  |  102  |  19  ----------------------------<  162[H]  3.8   |  22  |  0.74    Ca    8.4      20 Nov 2024 20:47  Phos  4.0     11-20  Mg     2.0     11-20    < from: CT Lumbar Spine No Cont (04.03.24 @ 10:34) >  IMPRESSION:  Scoliotic curvature of the spine.  Lumbar spondylosis, most notably at L3/L4, resulting moderate spinal   canal narrowing and mild to moderate foraminal narrowing, and at L4/L5,   resulting in severe spinal canal narrowing and moderate right foraminal   narrowing.               
NEUROSURGERY - [facility] MEDICINE CO-MANAGEMENT INITIAL VISIT NOTE    CHIEF COMPLAINT: Patient is a 79y old  Female who presents with a chief complaint of scoliosis (25 Nov 2024 10:08)      HPI: 79F PMH AFib/flutter (DOAC stopped for OR), sick sinus syndrome s/p dual chamber PPM, HTN/HLD. (held since 11/10) admitted for T11-pelvis fusion to address Low back pain and coronal scoliosis.     Now s/p T11-pelvis instrumentation and fusion; L4-L5, L5-S1 TLIFs, T12-L1, L1-L2, L2-L3, L3-L4 posterior column osteotomies for coronal scoliosis deformity correction 11/20/2024.     Intraop EBL 1.5L given 3UPRBc, 3U FFP, 2U PLT s/p 2 U PRBC on 11/22.    Persistent R sided weakness post procedure, now improving.       Allergies    No Known Allergies    Home Medications:  Crestor 10 mg oral tablet: 1 tab(s) orally once a day (at bedtime) (20 Nov 2024 05:54)  metoprolol tartrate 75 mg oral tablet: 1 tab(s) orally 2 times a day (20 Nov 2024 05:54)  triamterene-hydrochlorothiazide 37.5 mg-25 mg oral capsule: 1 cap(s) orally once a day (20 Nov 2024 05:54)      MEDICATIONS  (STANDING):  bacitracin   Ointment 1 Application(s) Topical two times a day  bisacodyl Suppository 10 milliGRAM(s) Rectal daily  ceFAZolin   IVPB 2000 milliGRAM(s) IV Intermittent every 8 hours  enoxaparin Injectable 40 milliGRAM(s) SubCutaneous <User Schedule>  gabapentin 400 milliGRAM(s) Oral every 8 hours  methocarbamol 750 milliGRAM(s) Oral every 8 hours  metoprolol tartrate 50 milliGRAM(s) Oral two times a day  pantoprazole   Suspension 40 milliGRAM(s) Oral before breakfast  polyethylene glycol 3350 17 Gram(s) Oral two times a day  rosuvastatin 10 milliGRAM(s) Oral at bedtime  senna 2 Tablet(s) Oral at bedtime  triamterene 37.5 mG/hydrochlorothiazide 25 mG Tablet 1 Tablet(s) Oral daily    MEDICATIONS  (PRN):  acetaminophen     Tablet .. 650 milliGRAM(s) Oral every 6 hours PRN Temp greater or equal to 38C (100.4F), Mild Pain (1 - 3)      PAST MEDICAL & SURGICAL HISTORY:  HTN (hypertension)      Afib      H/O atrial flutter      Chronic back pain      H/O Hashimoto thyroiditis      H/O sick sinus syndrome      H/O heart block      Status cardiac pacemaker      H/O breast augmentation      H/O cardiac radiofrequency ablation      [x ] Reviewed     Functional Assessment: [ ] Independent  [ x] Assistance  [ ] Total care  [ ] Non-ambulatory    SOCIAL HISTORY:  Residence: [ ] [facility]  [ ] SNF  [x ] Community  [x ] Substance abuse: None    FAMILY HISTORY:  [ x] No pertinent family history in first degree relatives of scoliosis    REVIEW OF SYSTEMS:    CONSTITUTIONAL: No fever, weight loss  EYES: No eye pain, visual disturbances, or discharge  ENMT:  No difficulty hearing, tinnitus, vertigo; No sinus or throat pain  NECK: No pain or stiffness  BREASTS: No pain, masses, or nipple discharge  RESPIRATORY: No cough, wheezing, chills or hemoptysis; No shortness of breath  CARDIOVASCULAR: No chest pain, palpitations, dizziness, or leg swelling  GASTROINTESTINAL: No abdominal or epigastric pain. No nausea, vomiting, or hematemesis; No diarrhea or constipation. No melena or hematochezia.  GENITOURINARY: No dysuria, frequency, hematuria, or incontinence  NEUROLOGICAL: No headaches, memory loss, or tremors  SKIN: No itching, burning, rashes, or lesions   LYMPH NODES: No enlarged glands  ENDOCRINE: No heat or cold intolerance; No hair loss  MUSCULOSKELETAL: No muscle or back pain  PSYCHIATRIC: No depression, anxiety, mood swings, or difficulty sleeping  HEME/LYMPH: No easy bruising, or bleeding gums  ALLERGY AND IMMUNOLOGIC: No hives or eczema    [x  ] All other ROS negative  [  ] Unable to obtain due to poor mental status    PHYSICAL EXAM:    Vital Signs Last 24 Hrs  T(C): 36.6 (28 Nov 2024 07:59), Max: 37.5 (28 Nov 2024 05:00)  T(F): 97.8 (28 Nov 2024 07:59), Max: 99.5 (28 Nov 2024 05:00)  HR: 63 (28 Nov 2024 07:59) (61 - 78)  BP: 115/66 (28 Nov 2024 07:59) (112/76 - 132/72)  BP(mean): --  RR: 18 (28 Nov 2024 07:59) (18 - 18)  SpO2: 94% (28 Nov 2024 07:59) (92% - 95%)    Parameters below as of 28 Nov 2024 07:59  Patient On (Oxygen Delivery Method): room air        CONSTITUTIONAL: Well-groomed, in no apparent distress  EYES: No conjunctival or scleral injection, non-icteric; PERRLA and symmetric  ENMT: No external nasal lesions; nasal mucosa not inflamed; normal dentition; no pharyngeal injection or exudates, oral mucosa with moist membranes  NECK: Trachea midline without palpable neck mass; thyroid not enlarged and non-tender  RESPIRATORY: Breathing comfortably; no dullness to percussion; lungs CTA without wheeze/rhonchi/rales  CARDIOVASCULAR: +S1S2, RRR, no M/G/R; no carotid bruits; pedal pulses full and symmetric; no lower extremity edema  GASTROINTESTINAL: No palpable masses or tenderness, +BS throughout, no rebound/guarding; no hepatosplenomegaly; no hernia palpated  LYMPHATIC: No cervical LAD or tenderness; no axillary LAD or tenderness; no inguinal LAD or tenderness  SKIN: No rashes or ulcers noted; no subcutaneous nodules or induration palpable  NEUROLOGIC: Mild RLE weakness- 4/5  PSYCHIATRIC: A+O x 3; mood and affect appropriate; appropriate insight and judgment    LABS:                        10.8   8.46  )-----------( 242      ( 27 Nov 2024 07:47 )             32.3     Hemoglobin: 10.8 g/dL (11-27 @ 07:47)    11-27    136  |  98  |  12  ----------------------------<  108[H]  3.8   |  26  |  0.56    Ca    8.6      27 Nov 2024 07:47        Urinalysis Basic - ( 27 Nov 2024 07:47 )    Color: x / Appearance: x / SG: x / pH: x  Gluc: 108 mg/dL / Ketone: x  / Bili: x / Urobili: x   Blood: x / Protein: x / Nitrite: x   Leuk Esterase: x / RBC: x / WBC x   Sq Epi: x / Non Sq Epi: x / Bacteria: x      CAPILLARY BLOOD GLUCOSE            RADIOLOGY & ADDITIONAL STUDIES:    EKG:   Personally Reviewed:  [ ] YES     Imaging:   Personally Reviewed:  [x ] YES- NSR              [ ] Consultant(s) Notes Reviewed  [x] Care Discussed with Consultants/Other Providers: Neurosurgery Team    [x ] Fall risks identified:

## 2024-11-28 NOTE — PROGRESS NOTE ADULT - ASSESSMENT
ASSESSMENT: 79F PMH AFib/flutter (DOAC stopped for OR), sick sinus syndrome s/p dual chamber PPM, HTN/HLD. (held since 11/10) admitted for T11-pelvis fusion to address Low back pain and coronal scoliosis.   now s/p T11-pelvis instrumentation and fusion; L4-L5, L5-S1 TLIFs, T12-L1, L1-L2, L2-L3, L3-L4 posterior column osteotomies for coronal scoliosis deformity correction 11/20/2024     NEURO: Neurochecks q4h   - MRI T/L Spine reviewed   - Remaining drains output in 24H: R deep 60cc, R superficial 95cc. Serosanguineous output.   - Standing scoli XR films when able    - pain management with tylenol PRN, Methocarbamol and gabapentin (inc yesterday 400 tid)   - TLSO when ambulating only  Activity: oob as robert w/ brace PT/Ot/PMR rec AR     PULM: on room air, sat >92%   Incentive spirometer at bedside encouraged, mobilize as tolerated    CV: -160 mmHg   h/o  PPM interrogated pre-post mri 11/25  continue home medication metoprolol   DOAC on HOLD given recent neurosurgical procedure     RENAL: IVL, voiding   replete electrolytes prn     GI: reg diet, probiotic while on ancef   continue bowel regimen with miralax and senna. LBM 11/27     ENDO: Goal euglycemia (-180)    HEME/ONC: intraop EBL 1.5L given 3UPRBc, 3U FFP, 2U PLT s/p 2 U PRBC on 11/22; CBC stable this AM   VTE prophylaxis: [x] SCDs [x] chemoprophylaxis  SQL, lower ext dopplers neg for DVT     ID: afebrile, no leukocytosis. Continue Ancef while drains in     will discuss with Dr. Allen   [id number]  ASSESSMENT: 79F PMH AFib/flutter (DOAC stopped for OR), sick sinus syndrome s/p dual chamber PPM, HTN/HLD. (held since 11/10) admitted for T11-pelvis fusion to address Low back pain and coronal scoliosis.   now s/p T11-pelvis instrumentation and fusion; L4-L5, L5-S1 TLIFs, T12-L1, L1-L2, L2-L3, L3-L4 posterior column osteotomies for coronal scoliosis deformity correction 11/20/2024     NEURO: Neurochecks q4h   - MRI T/L Spine reviewed   - Remaining drains output in 24H: R deep 60cc, R superficial 95cc. Serosanguineous output.   - Standing scoli XR films when able    - pain management with tylenol PRN, Methocarbamol and gabapentin (inc yesterday 400 tid)   - TLSO when ambulating only  Activity: oob as robert w/ brace PT/Ot/PMR rec AR     PULM: on room air, sat >92%   Incentive spirometer at bedside encouraged, mobilize as tolerated    CV: -160 mmHg   h/o  PPM interrogated pre-post mri 11/25  continue home medication metoprolol and triamterene   DOAC on HOLD given recent neurosurgical procedure     RENAL: IVL, voiding   replete electrolytes prn     GI: reg diet, probiotic while on ancef   continue bowel regimen with miralax and senna. LBM 11/27     ENDO: Goal euglycemia (-180)    HEME/ONC: intraop EBL 1.5L given 3UPRBc, 3U FFP, 2U PLT s/p 2 U PRBC on 11/22; CBC stable this AM   VTE prophylaxis: [x] SCDs [x] chemoprophylaxis  SQL, lower ext dopplers neg for DVT     ID: afebrile, no leukocytosis. Continue Ancef while drains in     will discuss with Dr. Allen   [id number]

## 2024-11-28 NOTE — CONSULT NOTE ADULT - PROBLEM SELECTOR RECOMMENDATION 2
Now in sinus/paced rhythm. On Metoprolol 50mg bid.     H/o sick sinus syndrome/ heart block,  atrial flutter s/p cardiac ablation x1 ( 2016), PPM dependent dual chamber status since 2013 ( revised / replaced batter in 2023/last interrogation in 06/2024)    Xarelto- per patient surgeon advised to stop for 10 days last dose 11/10/24- resume when cleared

## 2024-11-28 NOTE — CONSULT NOTE ADULT - ASSESSMENT
79 year  old RHD  female  with  c/o non radiating lower back pain for  many years, pain become more  troublesome for last 4 yrs  with  severe pain and lower extremity weakness of which sometimes worse in right side is worse . She tried all pain management modalities like NSAIDS, physical therapy, epidural  injects and facet blocks with no relief.   Pt states she has scoliosis which made symptoms worse with few  episodes of right side sciatic pain. Her pain increases with ambulation and movement and decreases with sitting. Worse pain is standing still.     PMH includes HTN, HLD, Afib atrial flutter in 2013 ( on Xarelto per patient surgeon advised to stop for 10 days last dose 11/10/24), h/o sick sinus syndrome/ heart block,  atrial flutter s/p cardiac ablation x1 ( 2016), PPM  dependent dual chamber status since 2013 (revised / replaced batter in 2023/last interrogation in 06/2024). breast augmentation (silicon breast implants) & chronic lower back pain.  S/P T11-pelvis instrumentation and fusion; L4-L5, L5-S1 TLIFs, T12-L1, L1-L2, L2-L3, L3-L4 PCO 11/20.     Current out- patient pain regimen: none  Out Patient Pain Management provider: none  [facility] Prescription Monitoring Program: Reference #[id number]    Current Pain Score: 5/10    Pt with acute on chronic back pain, scoliosis, spondylolisthesis.     PCA discontinued.  Oxy IR 5 mg Q 4 hours PRN moderate pain and 10 mg Q 4 hours PRN severe pain.  IV dilaudid 0.5 mg Q 6 hours PRN severe breakthrough pain.  Increase neurontin to 300 mg Q 8 hours- current CrCl is 72.7.  Continue robaxin 750 mg Q 8 hours.  Ofirmev 1000 mg IV Q 6 hours PRN.  Narcan 0.2 mg IV q 3 minutes x 3 doses PRN respiratory depression/ suspected OD.    Monitor for sedation and respiratory depression.  Bowel regimen.  Incentive spirometer.  OOB/ PT per primary team.    Discharge with a narcan rescue kit (naloxone 4 mg/ 0.1 ml nasal spray- 1 spray Q 2-3 minutes alternating between nostrils).  If continued pain management is needed after discharge, can see Dr Steve Matson [phone].      Minutes spent on total encounter: 60 minutes      Chronic Pain Service  [phone]      
79F PMH AFib/flutter (DOAC stopped for OR), sick sinus syndrome s/p dual chamber PPM, HTN/HLD admitted for T11-pelvis fusion to address Low back pain and coronal scoliosis.     Now s/p T11-pelvis instrumentation and fusion; L4-L5, L5-S1 TLIFs, T12-L1, L1-L2, L2-L3, L3-L4 posterior column osteotomies for coronal scoliosis deformity correction 11/20/2024.     Intraop EBL 1.5L given 3UPRBc, 3U FFP, 2U PLT s/p 2 U PRBC on 11/22.    Persistent R sided weakness post procedure, now improving.

## 2024-11-28 NOTE — PROGRESS NOTE ADULT - SUBJECTIVE AND OBJECTIVE BOX
SUBJECTIVE: Patient evaluated this AM, endorsing continued RLE weakness at the hip that is stable. Gabapentin inc yesterday. Vitals and labs reviewed, stable.     Vital Signs Last 24 Hrs  T(C): 36.6 (11-28-24 @ 07:59), Max: 37.5 (11-28-24 @ 05:00)  T(F): 97.8 (11-28-24 @ 07:59), Max: 99.5 (11-28-24 @ 05:00)  HR: 63 (11-28-24 @ 07:59) (61 - 78)  BP: 115/66 (11-28-24 @ 07:59) (112/76 - 135/70)  BP(mean): --  RR: 18 (11-28-24 @ 07:59) (18 - 18)  SpO2: 94% (11-28-24 @ 07:59) (92% - 96%)    PHYSICAL EXAM:  Constitutional: No Acute Distress   Neurological: Awake/Alert, oriented x3 (person, place and time), EOMI, no facial asymmetry, speech fluent, Motor exam upper extremities b/l 5/5 strength equal sensation, RLE HF 4-/5, KF/KE 4/5, distally 4+/5, LLE 5/5 except HF 4/5. sensation intact   Incision: thoraco lumbar incision is clean dry intact, overlying dressing dry  Drains: 2 surgical HMV drains, serosanguineous output   Pulmonary: Clear lungs   Cardiovascular: irreg rate   Gastrointestinal: Soft, Non-tender, Non-distended abdomen +bowel sounds presents   Extremities: No calf tenderness bilaterally, no edema. RUE hand/forearm aging ecchymosis stable does not go beyond previously drawn borders. Pulse 1+ RUE hand is warm with adequate perfusion     LABS:                      10.8   8.46  )-----------( 242      ( 27 Nov 2024 07:47 )             32.3    11-27    136  |  98  |  12  ----------------------------<  108[H]  3.8   |  26  |  0.56    Ca    8.6      27 Nov 2024 07:47    11-27 @ 07:01  -  11-28 @ 07:00  --------------------------------------------------------  IN: 720 mL / OUT: 555 mL / NET: 165 mL    IMAGING:   < from: MR Lumbar Spine No Cont (11.25.24 @ 12:26) >  ACC: [id number] EXAM:  MR SPINE THORACIC   ORDERED BY: REX COTE   ACC: [id number] EXAM:  MR SPINE LUMBAR   ORDERED BY: REX COTE   PROCEDURE DATE:  11/25/2024    INTERPRETATION:  MRI of the thoracic and lumbar spine without contrast  CLINICAL INDICATION: Status post T11 through pelvic fusion, increasing   right lower extremity weakness    IMPRESSION:  MRI THORACIC SPINE:  Redemonstration of posterior fusion of T11-S1, bilateral sacroiliac   fusion, and multilevel facetectomies.  No gross evidence for abnormal intrinsic cord signal.  No spinal cord compression.  No significant spinal canal stenosis in the thoracic region. Mild   multilevel neural foraminal narrowing lower thoracic region.  Expected postoperative edema throughout the posterior paraspinal soft   tissues from T9 through the inferior extent of the examination.    MRI LUMBAR SPINE:  Redemonstration of posterior fusion of T11-S1, bilateral sacroiliac   fusion, and multilevel facetectomies.    Mild multilevel mild spinal canal stenosis and neural foraminal narrowing   secondary to multilevel degenerative changes characterized by   degenerative disc disease and facet/ligamentous hypertrophy.    Expected postoperative edema throughout the posterior paraspinal soft   tissues at the levels of surgery.  --- End of Report ---  MARIAJOSE MUNOZ MD; Attending Radiologist  This document has been electronically signed. Nov 25 2024  5:02PM  < end of copied text >    MEDICATIONS  (STANDING):  bacitracin   Ointment 1 Application(s) Topical two times a day  bisacodyl Suppository 10 milliGRAM(s) Rectal daily  enoxaparin Injectable 40 milliGRAM(s) SubCutaneous <User Schedule>  gabapentin 400 milliGRAM(s) Oral every 8 hours  methocarbamol 750 milliGRAM(s) Oral every 8 hours  metoprolol tartrate 50 milliGRAM(s) Oral two times a day  pantoprazole   Suspension 40 milliGRAM(s) Oral before breakfast  polyethylene glycol 3350 17 Gram(s) Oral two times a day  rosuvastatin 10 milliGRAM(s) Oral at bedtime  senna 2 Tablet(s) Oral at bedtime  triamterene 37.5 mG/hydrochlorothiazide 25 mG Tablet 1 Tablet(s) Oral daily    MEDICATIONS  (PRN):  acetaminophen     Tablet .. 650 milliGRAM(s) Oral every 6 hours PRN Temp greater or equal to 38C (100.4F), Mild Pain (1 - 3)    DIET: reg    SUBJECTIVE: Patient evaluated this AM, endorsing improved RLE Hip weakness, observed ambulating with PT. Gabapentin inc yesterday. Vitals and labs reviewed, stable.     Vital Signs Last 24 Hrs  T(C): 36.6 (11-28-24 @ 07:59), Max: 37.5 (11-28-24 @ 05:00)  T(F): 97.8 (11-28-24 @ 07:59), Max: 99.5 (11-28-24 @ 05:00)  HR: 63 (11-28-24 @ 07:59) (61 - 78)  BP: 115/66 (11-28-24 @ 07:59) (112/76 - 135/70)  BP(mean): --  RR: 18 (11-28-24 @ 07:59) (18 - 18)  SpO2: 94% (11-28-24 @ 07:59) (92% - 96%)    PHYSICAL EXAM:  Constitutional: No Acute Distress   Neurological: Awake/Alert, oriented x3 (person, place and time), EOMI, no facial asymmetry, speech fluent, Motor exam upper extremities b/l 5/5 strength equal sensation, RLE HF 4/5, KF/KE 4/5, distally 4+/5, LLE 5/5 except HF 4+/5. sensation intact   Incision: thoraco lumbar incision is clean dry intact, overlying dressing dry  Drains: 2 surgical HMV drains, serosanguineous output   Pulmonary: Clear lungs   Cardiovascular: irreg rate   Gastrointestinal: Soft, Non-tender, Non-distended abdomen +bowel sounds presents   Extremities: No calf tenderness bilaterally, no edema. RUE hand/forearm aging ecchymosis stable does not go beyond previously drawn borders. Pulse 1+ RUE hand is warm with adequate perfusion     LABS:                      10.8   8.46  )-----------( 242      ( 27 Nov 2024 07:47 )             32.3    11-27    136  |  98  |  12  ----------------------------<  108[H]  3.8   |  26  |  0.56    Ca    8.6      27 Nov 2024 07:47    11-27 @ 07:01  -  11-28 @ 07:00  --------------------------------------------------------  IN: 720 mL / OUT: 555 mL / NET: 165 mL    IMAGING:   < from: MR Lumbar Spine No Cont (11.25.24 @ 12:26) >  ACC: [id number] EXAM:  MR SPINE THORACIC   ORDERED BY: REX COTE   ACC: [id number] EXAM:  MR SPINE LUMBAR   ORDERED BY: REX COTE   PROCEDURE DATE:  11/25/2024    INTERPRETATION:  MRI of the thoracic and lumbar spine without contrast  CLINICAL INDICATION: Status post T11 through pelvic fusion, increasing   right lower extremity weakness    IMPRESSION:  MRI THORACIC SPINE:  Redemonstration of posterior fusion of T11-S1, bilateral sacroiliac   fusion, and multilevel facetectomies.  No gross evidence for abnormal intrinsic cord signal.  No spinal cord compression.  No significant spinal canal stenosis in the thoracic region. Mild   multilevel neural foraminal narrowing lower thoracic region.  Expected postoperative edema throughout the posterior paraspinal soft   tissues from T9 through the inferior extent of the examination.    MRI LUMBAR SPINE:  Redemonstration of posterior fusion of T11-S1, bilateral sacroiliac   fusion, and multilevel facetectomies.    Mild multilevel mild spinal canal stenosis and neural foraminal narrowing   secondary to multilevel degenerative changes characterized by   degenerative disc disease and facet/ligamentous hypertrophy.    Expected postoperative edema throughout the posterior paraspinal soft   tissues at the levels of surgery.  --- End of Report ---  MARIAJOSE MUNOZ MD; Attending Radiologist  This document has been electronically signed. Nov 25 2024  5:02PM  < end of copied text >    MEDICATIONS  (STANDING):  bacitracin   Ointment 1 Application(s) Topical two times a day  bisacodyl Suppository 10 milliGRAM(s) Rectal daily  enoxaparin Injectable 40 milliGRAM(s) SubCutaneous <User Schedule>  gabapentin 400 milliGRAM(s) Oral every 8 hours  methocarbamol 750 milliGRAM(s) Oral every 8 hours  metoprolol tartrate 50 milliGRAM(s) Oral two times a day  pantoprazole   Suspension 40 milliGRAM(s) Oral before breakfast  polyethylene glycol 3350 17 Gram(s) Oral two times a day  rosuvastatin 10 milliGRAM(s) Oral at bedtime  senna 2 Tablet(s) Oral at bedtime  triamterene 37.5 mG/hydrochlorothiazide 25 mG Tablet 1 Tablet(s) Oral daily    MEDICATIONS  (PRN):  acetaminophen     Tablet .. 650 milliGRAM(s) Oral every 6 hours PRN Temp greater or equal to 38C (100.4F), Mild Pain (1 - 3)    DIET: reg

## 2024-11-29 ENCOUNTER — TRANSCRIPTION ENCOUNTER (OUTPATIENT)
Age: 79
End: 2024-11-29

## 2024-11-29 PROCEDURE — 99232 SBSQ HOSP IP/OBS MODERATE 35: CPT

## 2024-11-29 RX ORDER — METHOCARBAMOL 500 MG/1
750 TABLET, FILM COATED ORAL EVERY 8 HOURS
Refills: 0 | Status: DISCONTINUED | OUTPATIENT
Start: 2024-11-29 | End: 2024-12-01

## 2024-11-29 RX ORDER — TRAMADOL HYDROCHLORIDE 300 MG/1
25 CAPSULE ORAL EVERY 6 HOURS
Refills: 0 | Status: DISCONTINUED | OUTPATIENT
Start: 2024-11-29 | End: 2024-12-02

## 2024-11-29 RX ADMIN — TRAMADOL HYDROCHLORIDE 25 MILLIGRAM(S): 300 CAPSULE ORAL at 17:01

## 2024-11-29 RX ADMIN — PANTOPRAZOLE SODIUM 40 MILLIGRAM(S): 40 TABLET, DELAYED RELEASE ORAL at 06:13

## 2024-11-29 RX ADMIN — TRAMADOL HYDROCHLORIDE 25 MILLIGRAM(S): 300 CAPSULE ORAL at 18:01

## 2024-11-29 RX ADMIN — Medication 1 APPLICATION(S): at 17:01

## 2024-11-29 RX ADMIN — ENOXAPARIN SODIUM 40 MILLIGRAM(S): 30 INJECTION SUBCUTANEOUS at 17:01

## 2024-11-29 RX ADMIN — GABAPENTIN 400 MILLIGRAM(S): 300 CAPSULE ORAL at 14:41

## 2024-11-29 RX ADMIN — GABAPENTIN 400 MILLIGRAM(S): 300 CAPSULE ORAL at 05:46

## 2024-11-29 RX ADMIN — Medication 100 MILLIGRAM(S): at 22:22

## 2024-11-29 RX ADMIN — GABAPENTIN 400 MILLIGRAM(S): 300 CAPSULE ORAL at 22:23

## 2024-11-29 RX ADMIN — Medication 2 TABLET(S): at 22:22

## 2024-11-29 RX ADMIN — METOPROLOL TARTRATE 50 MILLIGRAM(S): 100 TABLET, FILM COATED ORAL at 05:45

## 2024-11-29 RX ADMIN — METHOCARBAMOL 750 MILLIGRAM(S): 500 TABLET, FILM COATED ORAL at 05:45

## 2024-11-29 RX ADMIN — METOPROLOL TARTRATE 50 MILLIGRAM(S): 100 TABLET, FILM COATED ORAL at 18:41

## 2024-11-29 RX ADMIN — Medication 100 MILLIGRAM(S): at 14:43

## 2024-11-29 RX ADMIN — TRIAMTERENE AND HYDROCHLOROTHIAZIDE 1 TABLET(S): 25; 37.5 CAPSULE ORAL at 05:46

## 2024-11-29 RX ADMIN — Medication 1 APPLICATION(S): at 05:45

## 2024-11-29 RX ADMIN — ROSUVASTATIN CALCIUM 10 MILLIGRAM(S): 5 TABLET, FILM COATED ORAL at 22:23

## 2024-11-29 RX ADMIN — TRAMADOL HYDROCHLORIDE 25 MILLIGRAM(S): 300 CAPSULE ORAL at 23:22

## 2024-11-29 RX ADMIN — Medication 100 MILLIGRAM(S): at 05:45

## 2024-11-29 NOTE — DISCHARGE NOTE PROVIDER - REASON FOR ADMISSION
11/20 s/p T11-pelvis instrumentation and fusion; L4-L5, L5-S1 TLIFs, T12-L1, L1-L2, L2-L3, L3-L4 posterior column osteotomies for coronal scoliosis deformity correction

## 2024-11-29 NOTE — DISCHARGE NOTE PROVIDER - NSDCFUADDINST_GEN_ALL_CORE_FT
Please make all necessary appointments and follow up. Please DO NOT take any NSAIDs (Advil, Aleve, Motrin, Ibuprofen) until cleared by your Neurosurgeon. Please DO NOT do any heavy lifting, bending, twisting and straining. You may shower, but DO NOT do any scrubbing directly at incision site. Pat dry only. Please come to the emergency room for any of the following: altered mental status, seizures, pain uncontrolled by pain medications, fevers, leaking / bleeding from surgical site, chest pain and shortness of breath.

## 2024-11-29 NOTE — CHART NOTE - NSCHARTNOTEFT_GEN_A_CORE
CAPRINI SCORE [CLOT] Score on Admission for     AGE RELATED RISK FACTORS                                                       MOBILITY RELATED FACTORS  [ ] Age 41-60 years                                            (1 Point)                  [ ] Bed rest                                                        (1 Point)  [ ] Age: 61-74 years                                           (2 Points)                 [ ] Plaster cast                                                   (2 Points)  [x ] Age= 75 years                                              (3 Points)                 [ ] Bed bound for more than 72 hours                 (2 Points)    DISEASE RELATED RISK FACTORS                                               GENDER SPECIFIC FACTORS  [ ] Edema in the lower extremities                       (1 Point)                  [ ] Pregnancy                                                     (1 Point)  [ ] Varicose veins                                               (1 Point)                  [ ] Post-partum < 6 weeks                                   (1 Point)             [x ] BMI > 25 Kg/m2                                            (1 Point)                  [ ] Hormonal therapy  or oral contraception          (1 Point)                 [ ] Sepsis (in the previous month)                        (1 Point)                  [ ] History of pregnancy complications                 (1 point)  [ ] Pneumonia or serious lung disease                                               [ ] Unexplained or recurrent                     (1 Point)           (in the previous month)                               (1 Point)  [ ] Abnormal pulmonary function test                     (1 Point)                 SURGERY RELATED RISK FACTORS (include planned surgeries)  [ ] Acute myocardial infarction                              (1 Point)                 [ ]  Section                                             (1 Point)  [ ] Congestive heart failure (in the previous month)  (1 Point)         [ ] Minor surgery                                                  (1 Point)   [ ] Inflammatory bowel disease                             (1 Point)                 [ ] Arthroscopic surgery                                        (2 Points)  [ ] Central venous access                                      (2 Points)                [ x] General surgery lasting more than 45 minutes   (2 Points)       [ ] Stroke (in the previous month)                          (5 Points)               [ ] Elective arthroplasty                                         (5 Points)            [ ] current or past malignancy                              (2 Points)                                                                                                       HEMATOLOGY RELATED FACTORS                                                 TRAUMA RELATED RISK FACTORS  [ ] Prior episodes of VTE                                     (3 Points)                [ ] Fracture of the hip, pelvis, or leg                       (5 Points)  [ ] Positive family history for VTE                         (3 Points)                 [ ] Acute spinal cord injury (in the previous month)  (5 Points)  [ ] Prothrombin 13847 A                                     (3 Points)                 [ ] Paralysis  (less than 1 month)                             (5 Points)  [ ] Factor V Leiden                                             (3 Points)                  [ ] Multiple Trauma within 1 month                        (5 Points)  [ ] Lupus anticoagulants                                     (3 Points)                                                           [ ] Anticardiolipin antibodies                               (3 Points)                                                       [ ] High homocysteine in the blood                      (3 Points)                                             [ ] Other congenital or acquired thrombophilia      (3 Points)                                                [ ] Heparin induced thrombocytopenia                  (3 Points)                                          Total Score [   6       ]    Risk:  Very low 0   Low 1 to 2   Moderate 3 to 4   High =5       VTE Prophylasix Recommednations:  [x ] mechanical pneumatic compression devices                                      [ ] contraindicated: _____________________  [ ] chemo prophylasix                                                                                   [x ] contraindicated _____________________    **** HIGH LIKELIHOOD DVT PRESENT ON ADMISSION  [x ] (please order LE dopplers within 24 hours of admission)
Gastroenterology Clinic Follow up Visit    Kadie Nicholas  [id number]  Chief Complaint   Patient presents with    Follow-up     BM every 3 days, painful. Denies blood in stool. HPI and A/P at last visit summarized below:  Patient is here for follow-up.,  Patient reports having abdominal pain prior to defecation and takes Bentyl with relief of symptoms. Colonoscopy in June 2022 showed diverticulosis and hemorrhoid, CT abdomen pelvis in May 2022 which only showed inguinal lymphadenopathy otherwise unremarkable, as evident stool in the large bowel. At times patient experience nausea and diaphoresis during a BM. Patient has been using Bentyl as needed but it takes about an hour to see an effect. Review of Systems   Constitutional: Negative. HENT: Negative. Eyes: Negative. Respiratory: Negative. Cardiovascular: Negative. Gastrointestinal: Negative. Negative for abdominal distention, abdominal pain, anal bleeding, blood in stool, constipation, diarrhea, nausea, rectal pain and vomiting. Intermittent abdominal pain   Endocrine: Negative. Genitourinary: Negative. Musculoskeletal: Negative. Skin: Negative. Allergic/Immunologic: Positive for food allergies. Neurological: Negative. Hematological: Negative. Psychiatric/Behavioral: Negative. Past medical history, past surgical history, medication list, social and familyhistory reviewed    Pulse 78, height 5' 2\" (1.575 m), weight 192 lb (87.1 kg), last menstrual period 04/01/2021, SpO2 99 %, not currently breastfeeding. Physical Exam  Constitutional:       Appearance: She is well-developed. HENT:      Head: Normocephalic and atraumatic. Eyes:      Conjunctiva/sclera: Conjunctivae normal.      Pupils: Pupils are equal, round, and reactive to light. Cardiovascular:      Rate and Rhythm: Normal rate.    Pulmonary:      Effort: Pulmonary effort is normal.   Abdominal:      General: Bowel sounds are normal.
R DEEP surgical drain cleared for removal per neurosurgeon Dr. Allen. Drain off-suction and removed without complication.  sterile dressing applied..  Patient tolerated procedure well.
79 year  old RHD  female  with  c/o non radiating lower back pain for  many years, pain become more  troublesome for last 4 yrs  with  severe pain and lower extremity weakness of which sometimes worse in right side is worse . She tried all pain management modalities like NSAIDS, physical therapy, epidural  injects and facet blocks with no relief.   Pt states she has scoliosis which made symptoms worse with few  episodes of right side sciatic pain. Her pain increases with ambulation and movement and decreases with sitting. Worse pain is standing still.     PMH includes HTN, HLD, Afib atrial flutter in 2013 ( on Xarelto per patient surgeon advised to stop for 10 days last dose 11/10/24), h/o sick sinus syndrome/ heart block,  atrial flutter s/p cardiac ablation x1 ( 2016), PPM  dependent dual chamber status since 2013 (revised / replaced batter in 2023/last interrogation in 06/2024). breast augmentation (silicon breast implants) & chronic lower back pain.  S/P T11-pelvis instrumentation and fusion; L4-L5, L5-S1 TLIFs, T12-L1, L1-L2, L2-L3, L3-L4 PCO 11/20.     Current out- patient pain regimen: none  Out Patient Pain Management provider: none  [facility] Prescription Monitoring Program: Reference #[id number]    Pt with acute on chronic back pain, scoliosis, spondylolisthesis.     EMR reviewed, patient w/ 0/10 pain documented, no opioids required since PCA Dilaudid discontinued 24+ hrs ago.    Continue current regimen    Signing off, reconsult as needed    Chronic Pain Service  [phone]
Measured B-B and adjusted [place] TLSO based on patient being intubated and not allowed to get out of bed for the time being. Adjusted circumferential abdominal waist belt and shoulder straps 2 daughters were present for OOB and don/doff explanation. Explained to always wear a article of clothing to buffer skin. Gave [facility]  [phone]

## 2024-11-29 NOTE — DISCHARGE NOTE PROVIDER - HOSPITAL COURSE
79 year  old RHD  female  with  c/o non radiating lower back pain for  many years, pain become more  troublesome for last 4 yrs  with  severe pain and lower extremity weakness of which sometimes worse in right side is worse . She tried all pain management modalities like NSAIDS, , physical therapy, epidural  injects and facet blocks with no relief at all".   Pt states she is having scoliosis which made symptoms worse with few  episodes of right side sciatic pain. Her pain increases with ambulation and movement and decreases with sitting. Worse pain is standing still. No recent trauma. S/p surgical consult & scheduled for T11-Pelvis posterior stabilization and fusion on 11/19/2024.  PMH : HTN, HLD, Afib atrial flutter in 2013 ( on Xarelto per patient surgeon advised to stop for 10 days last dose 11/10/24), h/o sick sinus syndrome/ haert block,  atrial flutter s/p cardiac ablation x1 ( 2016), PPM  dependent dual chamber status since 2013( revised / replaced batter in 2023/last interrogation in 06/2024). history of breast augmentation  (silicon breast implants) & chronic lower back pain.  ?  patient now s/p T11-pelvis instrumentation and fusion; L4-L5, L5-S1 TLIFs, T12-L1, L1-L2, L2-L3, L3-L4 posterior column osteotomies for coronal scoliosis deformity correction with Dr. Allen on 11/20 1 prevena vac and 3 surgical drains placed. post op monitored in NSCU. initially no cuff leak postop given [id number] x24H. Extubated in NSCU. 11/21. placed on PCA, passed swallow assessment diet upgrade. 11/21 received 2u pRBC for Hgb drop to 6.3 from 8.0. CT T/L-spine done with good hardware placement. RUE Duplex Venous and Arterial because of ecchymosis around previous IV and A-line sites. Pulses remain 1+. On 11/25  Patient c/o RLE pain overnight, this morning she c/o Rt anterior thigh pain which is worsened with movement, RLE is weaker on exam. MRI performed. 79 year  old RHD  female  with  c/o non radiating lower back pain for  many years, pain become more  troublesome for last 4 yrs  with  severe pain and lower extremity weakness of which sometimes worse in right side is worse . She tried all pain management modalities like NSAIDS, , physical therapy, epidural  injects and facet blocks with no relief at all".   Pt states she is having scoliosis which made symptoms worse with few  episodes of right side sciatic pain. Her pain increases with ambulation and movement and decreases with sitting. Worse pain is standing still. No recent trauma. S/p surgical consult & scheduled for T11-Pelvis posterior stabilization and fusion on 11/19/2024.  PMH : HTN, HLD, Afib atrial flutter in 2013 ( on Xarelto per patient surgeon advised to stop for 10 days last dose 11/10/24), h/o sick sinus syndrome/ haert block,  atrial flutter s/p cardiac ablation x1 ( 2016), PPM  dependent dual chamber status since 2013( revised / replaced batter in 2023/last interrogation in 06/2024). history of breast augmentation  (silicon breast implants) & chronic lower back pain.  ?  patient now s/p T11-pelvis instrumentation and fusion; L4-L5, L5-S1 TLIFs, T12-L1, L1-L2, L2-L3, L3-L4 posterior column osteotomies for coronal scoliosis deformity correction with Dr. Allen on 11/20 1 prevena vac and 3 surgical drains placed. post op monitored in NSCU. initially no cuff leak postop given [id number] x24H. Extubated in NSCU. 11/21. placed on PCA, passed swallow assessment diet upgrade. 11/21 received 2u pRBC for Hgb drop to 6.3 from 8.0. CT T/L-spine done with good hardware placement. RUE Duplex Venous and Arterial because of ecchymosis around previous IV and A-line sites. Pulses remain 1+. On 11/25  Patient c/o RLE pain overnight, this morning she c/o Rt anterior thigh pain which is worsened with movement, RLE is weaker on exam. MRI performed which showed redemonstration of posterior fusion of T11-S1, bilateral sacroiliac fusion, and multilevel facetectomies. No gross evidence for abnormal intrinsic cord signal. No spinal cord compression. No significant spinal canal stenosis in the thoracic region. Mild multilevel neural foraminal narrowing lower thoracic region. Expected postoperative edema throughout the posterior paraspinal soft tissues from T9 through the inferior extent of the examination. Her RLE weakness improved. Her surgical drains and Prevena wound vac were removed prior to discharge. PT/OT/PMR saw patient and deemed candidate for acute rehab. On day of discharge, patient is neurologically and hemodynamically stable.    79 year  old RHD  female  with  c/o non radiating lower back pain for  many years, pain become more  troublesome for last 4 yrs  with  severe pain and lower extremity weakness of which sometimes worse in right side is worse . She tried all pain management modalities like NSAIDS, , physical therapy, epidural  injects and facet blocks with no relief at all".   Pt states she is having scoliosis which made symptoms worse with few  episodes of right side sciatic pain. Her pain increases with ambulation and movement and decreases with sitting. Worse pain is standing still. No recent trauma. S/p surgical consult & scheduled for T11-Pelvis posterior stabilization and fusion on 11/19/2024.  PMH : HTN, HLD, Afib atrial flutter in 2013 ( on Xarelto per patient surgeon advised to stop for 10 days last dose 11/10/24), h/o sick sinus syndrome/ haert block,  atrial flutter s/p cardiac ablation x1 ( 2016), PPM  dependent dual chamber status since 2013( revised / replaced batter in 2023/last interrogation in 06/2024). history of breast augmentation  (silicon breast implants) & chronic lower back pain.  ?  patient now s/p T11-pelvis instrumentation and fusion; L4-L5, L5-S1 TLIFs, T12-L1, L1-L2, L2-L3, L3-L4 posterior column osteotomies for coronal scoliosis deformity correction with Dr. Allen on 11/20 1 prevena vac and 3 surgical drains placed. post op monitored in NSCU. initially no cuff leak postop given [id number] x24H. Extubated in NSCU. 11/21. placed on PCA, passed swallow assessment diet upgrade. 11/21 received 2u pRBC for Hgb drop to 6.3 from 8.0. CT T/L-spine done with good hardware placement. RUE Duplex Venous and Arterial because of ecchymosis around previous IV and A-line sites which reveals right radial artery is demonstrates diminished flow velocity at the mid forearm and is occluded at distal forearm. There is retrograde right radial artery flow at the wrist. Surrounding soft tissue edema is noted. Antegrade flow is noted of the right ulnar artery. Pulses remain 1+. Patients right hand remains to have good capillary refill, warm. On 11/25 patient c/o RLE pain overnight, and in the morning she c/o Rt anterior thigh pain which is worsened with movement, RLE is weaker on exam. MRI performed which showed redemonstration of posterior fusion of T11-S1, bilateral sacroiliac fusion, and multilevel facetectomies. No gross evidence for abnormal intrinsic cord signal. No spinal cord compression. No significant spinal canal stenosis in the thoracic region. Mild multilevel neural foraminal narrowing lower thoracic region. Expected postoperative edema throughout the posterior paraspinal soft tissues from T9 through the inferior extent of the examination. Her RLE weakness improved. Her surgical drains and Prevena wound vac were removed prior to discharge. PT/OT/PMR saw patient and deemed candidate for acute rehab. On day of discharge, patient is neurologically and hemodynamically stable.

## 2024-11-29 NOTE — DISCHARGE NOTE PROVIDER - NSDCMRMEDTOKEN_GEN_ALL_CORE_FT
Crestor 10 mg oral tablet: 1 tab(s) orally once a day (at bedtime)  metoprolol tartrate 75 mg oral tablet: 1 tab(s) orally 2 times a day  Patient is s/p T1-Pelvic Fusion: For when OOB  triamterene-hydrochlorothiazide 37.5 mg-25 mg oral capsule: 1 cap(s) orally once a day   acetaminophen 325 mg oral tablet: 2 tab(s) orally every 6 hours As needed Temp greater or equal to 38C (100.4F), Mild Pain (1 - 3)  bisacodyl 10 mg rectal suppository: 1 suppository(ies) rectal once a day  Crestor 10 mg oral tablet: 1 tab(s) orally once a day (at bedtime)  enoxaparin: 40 milligram(s) subcutaneous once a day (at bedtime)  gabapentin 400 mg oral capsule: 1 cap(s) orally once a day (in the morning)  gabapentin 600 mg oral tablet: 1 tab(s) orally once a day (at bedtime)  methocarbamol 500 mg oral tablet: 1 tab(s) orally every 8 hours As needed Muscle Spasm  metoprolol tartrate 50 mg oral tablet: 1 tab(s) orally 2 times a day  pantoprazole 40 mg oral delayed release tablet: 1 tab(s) orally once a day (before a meal)  Patient is s/p T1-Pelvic Fusion: For when OOB  polyethylene glycol 3350 oral powder for reconstitution: 17 gram(s) orally 2 times a day  senna leaf extract oral tablet: 2 tab(s) orally once a day (at bedtime)  traMADol 50 mg oral tablet: 0.5 tab(s) orally every 6 hours As needed Moderate Pain (4 - 6)  triamterene-hydrochlorothiazide 37.5 mg-25 mg oral capsule: 1 cap(s) orally once a day

## 2024-11-29 NOTE — PROGRESS NOTE ADULT - ASSESSMENT
ASSESSMENT: 79F PMH AFib/flutter (DOAC stopped for OR), sick sinus syndrome s/p dual chamber PPM, HTN/HLD. (held since 11/10) admitted for T11-pelvis fusion to address Low back pain and coronal scoliosis.   now s/p T11-pelvis instrumentation and fusion; L4-L5, L5-S1 TLIFs, T12-L1, L1-L2, L2-L3, L3-L4 posterior column osteotomies for coronal scoliosis deformity correction 11/20/2024     NEURO: Neurochecks q4h   - MRI T/L Spine reviewed   - Remaining drains output in 24H: R deep 45cc, R superficial 70cc. Serosanguineous output. - Deep drain discontinued today 11/29  - Standing scoli XR films when able    - pain management with tylenol PRN, Methocarbamol and gabapentin (inc yesterday 400 tid)   - TLSO when ambulating only  Activity: oob as robert w/ brace PT/Ot/PMR rec AR     PULM: on room air, sat >92%   Incentive spirometer at bedside encouraged, mobilize as tolerated    CV: -160 mmHg   h/o  PPM interrogated pre-post mri 11/25  continue home medication metoprolol and triamterene   DOAC on HOLD given recent neurosurgical procedure     RENAL: IVL, voiding   replete electrolytes prn     GI: reg diet, probiotic while on ancef   continue bowel regimen with miralax and senna. LBM 11/27     ENDO: Goal euglycemia (-180)    HEME/ONC: intraop EBL 1.5L given 3UPRBc, 3U FFP, 2U PLT s/p 2 U PRBC on 11/22; CBC stable this AM   VTE prophylaxis: [x] SCDs [x] chemoprophylaxis  SQL, lower ext dopplers neg for DVT     ID: afebrile, no leukocytosis. Continue Ancef while drains in     will discuss with Dr. Allen   [id number]  ASSESSMENT: 79F PMH AFib/flutter (DOAC stopped for OR), sick sinus syndrome s/p dual chamber PPM, HTN/HLD. (held since 11/10) admitted for T11-pelvis fusion to address Low back pain and coronal scoliosis.   now s/p T11-pelvis instrumentation and fusion; L4-L5, L5-S1 TLIFs, T12-L1, L1-L2, L2-L3, L3-L4 posterior column osteotomies for coronal scoliosis deformity correction 11/20/2024     NEURO: Neurochecks q4h   - MRI T/L Spine reviewed   - Remaining drains output in 24H: R deep 45cc, R superficial 70cc. Serosanguineous output. - Deep drain discontinued today 11/29  - Standing scoli XR films when able    - pain management with tylenol PRN, Methocarbamol and gabapentin (inc yesterday 400 tid)   - TLSO when ambulating only  Activity: oob as robert w/ brace PT/Ot/PMR rec AR     PULM: on room air, sat >92%   Incentive spirometer at bedside encouraged, mobilize as tolerated    CV: -160 mmHg   h/o  PPM interrogated pre-post mri 11/25  continue home medication metoprolol and triamterene   DOAC on HOLD given recent neurosurgical procedure     RENAL: IVL, voiding   replete electrolytes prn     GI: reg diet, probiotic while on ancef   continue bowel regimen with miralax and senna. LBM 11/27     ENDO: Goal euglycemia (-180)    HEME/ONC: intraop EBL 1.5L given 3UPRBc, 3U FFP, 2U PLT s/p 2 U PRBC on 11/22; CBC stable this AM   VTE prophylaxis: [x] SCDs [x] chemoprophylaxis  SQL, lower ext dopplers neg for DVT     ID: afebrile, no leukocytosis. Continue Ancef while drains in     will discuss with Dr. Allen   [id number]

## 2024-11-29 NOTE — PROGRESS NOTE ADULT - SUBJECTIVE AND OBJECTIVE BOX
Negrito Servin   contact via pager or TEAMS        CC: Patient is a 79y old  Female who presents with a chief complaint of scoliosis (28 Nov 2024 11:07)      SUBJECTIVE / OVERNIGHT EVENTS:    MEDICATIONS  (STANDING):  bacitracin   Ointment 1 Application(s) Topical two times a day  bisacodyl Suppository 10 milliGRAM(s) Rectal daily  ceFAZolin   IVPB 2000 milliGRAM(s) IV Intermittent every 8 hours  enoxaparin Injectable 40 milliGRAM(s) SubCutaneous <User Schedule>  gabapentin 400 milliGRAM(s) Oral every 8 hours  methocarbamol 750 milliGRAM(s) Oral every 8 hours  metoprolol tartrate 50 milliGRAM(s) Oral two times a day  pantoprazole   Suspension 40 milliGRAM(s) Oral before breakfast  polyethylene glycol 3350 17 Gram(s) Oral two times a day  rosuvastatin 10 milliGRAM(s) Oral at bedtime  senna 2 Tablet(s) Oral at bedtime  triamterene 37.5 mG/hydrochlorothiazide 25 mG Tablet 1 Tablet(s) Oral daily    MEDICATIONS  (PRN):  acetaminophen     Tablet .. 650 milliGRAM(s) Oral every 6 hours PRN Temp greater or equal to 38C (100.4F), Mild Pain (1 - 3)      Vital Signs Last 24 Hrs  T(C): 36.6 (29 Nov 2024 09:21), Max: 37.8 (28 Nov 2024 20:52)  T(F): 97.8 (29 Nov 2024 09:21), Max: 100 (28 Nov 2024 20:52)  HR: 60 (29 Nov 2024 09:21) (60 - 79)  BP: 115/54 (29 Nov 2024 09:21) (115/54 - 151/53)  BP(mean): --  RR: 18 (29 Nov 2024 09:21) (18 - 18)  SpO2: 94% (29 Nov 2024 09:21) (92% - 94%)  CAPILLARY BLOOD GLUCOSE        I&O's Summary    28 Nov 2024 07:01  -  29 Nov 2024 07:00  --------------------------------------------------------  IN: 0 mL / OUT: 115 mL / NET: -115 mL      tele:    PHYSICAL EXAM:    GENERAL: NAD   HEENT: EOMI, PERRL  PULM: Clear to auscultation bilaterally  CV: Regular rate and rhythm; nl S1, S2; No murmurs, rubs, or gallops  ABDOMEN: Soft, Nontender, Nondistended; Bowel sounds present  EXTREMITIES/MSK:  No edema, calf tenderness   PSYCH: AAOx3  NEUROLOGY: non-focal          LABS:                      RADIOLOGY & ADDITIONAL TESTS:    Imaging Personally Reviewed:    Consultant(s) Notes Reviewed:      Care Discussed with Consultants/Other Providers:   Negrito Servin   contact via pager or TEAMS        CC: Patient is a 79y old  Female who presents with a chief complaint of scoliosis (28 Nov 2024 11:07)      SUBJECTIVE / OVERNIGHT EVENTS: improved RLE weakness. Had more difficulty with LE strength while walking this am. +BM. b/l feet paresthesias    MEDICATIONS  (STANDING):  bacitracin   Ointment 1 Application(s) Topical two times a day  bisacodyl Suppository 10 milliGRAM(s) Rectal daily  ceFAZolin   IVPB 2000 milliGRAM(s) IV Intermittent every 8 hours  enoxaparin Injectable 40 milliGRAM(s) SubCutaneous <User Schedule>  gabapentin 400 milliGRAM(s) Oral every 8 hours  methocarbamol 750 milliGRAM(s) Oral every 8 hours  metoprolol tartrate 50 milliGRAM(s) Oral two times a day  pantoprazole   Suspension 40 milliGRAM(s) Oral before breakfast  polyethylene glycol 3350 17 Gram(s) Oral two times a day  rosuvastatin 10 milliGRAM(s) Oral at bedtime  senna 2 Tablet(s) Oral at bedtime  triamterene 37.5 mG/hydrochlorothiazide 25 mG Tablet 1 Tablet(s) Oral daily    MEDICATIONS  (PRN):  acetaminophen     Tablet .. 650 milliGRAM(s) Oral every 6 hours PRN Temp greater or equal to 38C (100.4F), Mild Pain (1 - 3)      Vital Signs Last 24 Hrs  T(C): 36.6 (29 Nov 2024 09:21), Max: 37.8 (28 Nov 2024 20:52)  T(F): 97.8 (29 Nov 2024 09:21), Max: 100 (28 Nov 2024 20:52)  HR: 60 (29 Nov 2024 09:21) (60 - 79)  BP: 115/54 (29 Nov 2024 09:21) (115/54 - 151/53)  BP(mean): --  RR: 18 (29 Nov 2024 09:21) (18 - 18)  SpO2: 94% (29 Nov 2024 09:21) (92% - 94%)  CAPILLARY BLOOD GLUCOSE        I&O's Summary    28 Nov 2024 07:01  -  29 Nov 2024 07:00  --------------------------------------------------------  IN: 0 mL / OUT: 115 mL / NET: -115 mL      tele: nsr    PHYSICAL EXAM:    GENERAL: NAD   HEENT: EOMI, PERRL  PULM: Clear to auscultation bilaterally  CV: Regular rate and rhythm; nl S1, S2; No murmurs, rubs, or gallops  ABDOMEN: Soft, Nontender, Nondistended; Bowel sounds present  EXTREMITIES/MSK:  No edema, calf tenderness   PSYCH: AAOx3  NEUROLOGY: RLE HF 4/5, KF/KE 4/5, distally 4+/5, LLE 5/5 except HF 4+/5          LABS:                      RADIOLOGY & ADDITIONAL TESTS:    Imaging Personally Reviewed:    Consultant(s) Notes Reviewed:      Care Discussed with Consultants/Other Providers: neurosurg

## 2024-11-29 NOTE — DISCHARGE NOTE PROVIDER - NSDCCPCAREPLAN_GEN_ALL_CORE_FT
PRINCIPAL DISCHARGE DIAGNOSIS  Diagnosis: Back pain, chronic  Assessment and Plan of Treatment: 11/20 s/p T11-pelvis instrumentation and fusion; L4-L5, L5-S1 TLIFs, T12-L1, L1-L2, L2-L3, L3-L4 posterior column osteotomies for coronal scoliosis deformity correction  Please make an appointment and follow up with Dr. Allen in 1-2 weeks after discharge from rehab. Please wear your TLSO brace whenever you are out of bed. You may shower however please do NOT scrub directly at incision site, gently pat dry. Please keep incision clean. You make take Tylenol Acetaminophen) as needed for mild pain, please do NOT exceed 4grams/per day of Tylenol. You may take Tramdol for moderate to severe pain. Tramadol helps to control pain. Tramadol is an additive medication so it is important to limit the use of this medication.  Side effects include nausea, vomiting, constipation, dry mouth, lightheadedness, dizziness or drowsiness.  It is important to tell your physician if you experience any of these side effects. You are on Neurontin (Gabapentin). Neurontin helps control nerve pain and headaches.  Most common side effects include drowsiness, tiredness or weakness, dizziness and or unsteadiness, nausea and or vomiting, diarrhea or constipation, weight change, swelling of the hands, feet, ankles or lower legs.  A rare but serious side effect is a rash.  Please notify your physician if you notice any serious side effects. Please do NOT take any NSAIDs (Advil, Aleve, Motrin, Ibuprofen) as these medications can increase your risk of bleeding after surgery. Please do NOT do any heavy lifting, bending or twisting.      SECONDARY DISCHARGE DIAGNOSES  Diagnosis: Afib  Assessment and Plan of Treatment: Please continue Metoprolol as prescribed. You may restart Xarelto on 12/9/24. Please make an appointment and follow up with your cardiologist / primary care provider in 1-2 weeks after discharge from rehab.    Diagnosis: Pacemaker  Assessment and Plan of Treatment: You have a pacemaker, this was interrogated on 11/25 for MRI. It is [employer]if[employer], model Elena S DR. It was found to have measured data WNL, normal pacemaker function, NOT dependent. Please make an appointment and follow up with your cardiologist in 1-2 weeks after discharge from rehab.    Diagnosis: Hypertension  Assessment and Plan of Treatment: Please continue Maxzide as prescribed. Please make an appointment and follow up with your cardiologist / primary care provider in 1-2 weeks after discharge from rehab.    Diagnosis: Hyperlipidemia  Assessment and Plan of Treatment: Please continue Lipitor as prescribed. Please make an appointment and follow up with your cardiologist / primary care provider in 1-2 weeks after discharge from rehab.     PRINCIPAL DISCHARGE DIAGNOSIS  Diagnosis: Back pain, chronic  Assessment and Plan of Treatment: 11/20 s/p T11-pelvis instrumentation and fusion; L4-L5, L5-S1 TLIFs, T12-L1, L1-L2, L2-L3, L3-L4 posterior column osteotomies for coronal scoliosis deformity correction  Please make an appointment and follow up with Dr. Allen in 1-2 weeks after discharge from rehab. Please wear your TLSO brace whenever you are out of bed. You may shower however please do NOT scrub directly at incision site, gently pat dry. Please keep incision clean. You make take Tylenol Acetaminophen) as needed for mild pain, please do NOT exceed 4grams/per day of Tylenol. You may take Tramdol for moderate to severe pain. Tramadol helps to control pain. Tramadol is an additive medication so it is important to limit the use of this medication.  Side effects include nausea, vomiting, constipation, dry mouth, lightheadedness, dizziness or drowsiness.  It is important to tell your physician if you experience any of these side effects. You are on Neurontin (Gabapentin). Neurontin helps control nerve pain and headaches.  Most common side effects include drowsiness, tiredness or weakness, dizziness and or unsteadiness, nausea and or vomiting, diarrhea or constipation, weight change, swelling of the hands, feet, ankles or lower legs.  A rare but serious side effect is a rash.  Please notify your physician if you notice any serious side effects. You are also on Robaxin for muscle spasms. Robaxin is a muscle relaxant  Side effects of robaxin: tiredness, drowsiness. Please do NOT take any NSAIDs (Advil, Aleve, Motrin, Ibuprofen) as these medications can increase your risk of bleeding after surgery. Please do NOT do any heavy lifting, bending or twisting.      SECONDARY DISCHARGE DIAGNOSES  Diagnosis: Afib  Assessment and Plan of Treatment: Please continue Metoprolol as prescribed. You may restart Xarelto on 12/9/24. Please make an appointment and follow up with your cardiologist / primary care provider in 1-2 weeks after discharge from rehab.    Diagnosis: Pacemaker  Assessment and Plan of Treatment: You have a pacemaker, this was interrogated on 11/25 for MRI. It is Multistat, model [place] S DR. It was found to have measured data WNL, normal pacemaker function, NOT dependent. Please make an appointment and follow up with your cardiologist in 1-2 weeks after discharge from rehab.    Diagnosis: Hypertension  Assessment and Plan of Treatment: Please continue Maxzide as prescribed. Please make an appointment and follow up with your cardiologist / primary care provider in 1-2 weeks after discharge from rehab.    Diagnosis: Hyperlipidemia  Assessment and Plan of Treatment: Please continue Crestor as prescribed. Please make an appointment and follow up with your cardiologist / primary care provider in 1-2 weeks after discharge from rehab.    Diagnosis: Radial artery occlusion, right  Assessment and Plan of Treatment: On 11/23 you had a right upper extremity arterial duplex because of bruising on your right arm, it showed - Right radial artery demonstrates diminished flow velocity at the mid forearm and is occluded at distal forearm. There is retrograde right radial artery flow at the wrist. Surrounding soft tissue edema is noted. Antegrade flow is noted of the right ulnar artery. Your right hand is warm, skin is pink and good capillary refills, pulses 1+. Please monitor for signs of ischemia while in rehab facility.

## 2024-11-29 NOTE — PROGRESS NOTE ADULT - ASSESSMENT
79F PMH AFib/flutter (DOAC stopped for OR), sick sinus syndrome s/p dual chamber PPM, HTN/HLD admitted for T11-pelvis fusion to address Low back pain and coronal scoliosis.     Now s/p T11-pelvis instrumentation and fusion; L4-L5, L5-S1 TLIFs, T12-L1, L1-L2, L2-L3, L3-L4 posterior column osteotomies for coronal scoliosis deformity correction 11/20/2024.     Intraop EBL 1.5L given 3UPRBc, 3U FFP, 2U PLT s/p 2 U PRBC on 11/22.    Persistent R sided weakness post procedure, now improving.

## 2024-11-29 NOTE — PROGRESS NOTE ADULT - SUBJECTIVE AND OBJECTIVE BOX
SUBJECTIVE: Patient evaluated this AM, RLE weakness stable &improving per patient. Ambulating w/ walker and assistance. Vitals and labs reviewed, stable.     Vital Signs Last 24 Hrs  T(C): 36.6 (11-29-24 @ 09:21), Max: 37.8 (11-28-24 @ 20:52)  T(F): 97.8 (11-29-24 @ 09:21), Max: 100 (11-28-24 @ 20:52)  HR: 60 (11-29-24 @ 09:21) (60 - 79)  BP: 115/54 (11-29-24 @ 09:21) (115/54 - 151/53)  BP(mean): --  RR: 18 (11-29-24 @ 09:21) (18 - 18)  SpO2: 94% (11-29-24 @ 09:21) (92% - 94%)    PHYSICAL EXAM:  Constitutional: No Acute Distress   Neurological: Awake/Alert, oriented x3 (person, place and time), EOMI, no facial asymmetry, speech fluent, Motor exam upper extremities b/l 5/5 strength equal sensation, RLE HF 4/5, KF/KE 4/5, distally 4+/5, LLE 5/5 except HF 4+/5. sensation intact   Incision: thoraco lumbar incision is clean dry intact sutures. overlying dressing dry  Drains: 1 surgical HMV drains, serosanguineous output   Pulmonary: Clear lungs   Cardiovascular: irreg rate   Gastrointestinal: Soft, Non-tender, Non-distended abdomen +bowel sounds presents   Extremities: No calf tenderness bilaterally, no edema. RUE hand/forearm aging ecchymosis stable does not go beyond previously drawn borders. Pulse 1+ RUE hand is warm with adequate perfusion     LABS:        reviewed                 I&O's Summary  28 Nov 2024 07:01  -  29 Nov 2024 07:00  --------------------------------------------------------  IN: 0 mL / OUT: 115 mL / NET: -115 mL    IMAGING:   < from: MR Lumbar Spine No Cont (11.25.24 @ 12:26) >  ACC: [id number] EXAM:  MR SPINE THORACIC   ORDERED BY: REX COTE   ACC: [id number] EXAM:  MR SPINE LUMBAR   ORDERED BY: REX COTE   PROCEDURE DATE:  11/25/2024    INTERPRETATION:  MRI of the thoracic and lumbar spine without contrast  CLINICAL INDICATION: Status post T11 through pelvic fusion, increasing   right lower extremity weakness    IMPRESSION:  MRI THORACIC SPINE:  Redemonstration of posterior fusion of T11-S1, bilateral sacroiliac   fusion, and multilevel facetectomies.  No gross evidence for abnormal intrinsic cord signal.  No spinal cord compression.  No significant spinal canal stenosis in the thoracic region. Mild   multilevel neural foraminal narrowing lower thoracic region.  Expected postoperative edema throughout the posterior paraspinal soft   tissues from T9 through the inferior extent of the examination.    MRI LUMBAR SPINE:  Redemonstration of posterior fusion of T11-S1, bilateral sacroiliac   fusion, and multilevel facetectomies.    Mild multilevel mild spinal canal stenosis and neural foraminal narrowing   secondary to multilevel degenerative changes characterized by   degenerative disc disease and facet/ligamentous hypertrophy.    Expected postoperative edema throughout the posterior paraspinal soft   tissues at the levels of surgery.  --- End of Report ---  MARIAJOSE MUNOZ MD; Attending Radiologist  This document has been electronically signed. Nov 25 2024  5:02PM  < end of copied text >    MEDICATIONS  (STANDING):  bacitracin   Ointment 1 Application(s) Topical two times a day  bisacodyl Suppository 10 milliGRAM(s) Rectal daily  ceFAZolin   IVPB 2000 milliGRAM(s) IV Intermittent every 8 hours  enoxaparin Injectable 40 milliGRAM(s) SubCutaneous <User Schedule>  gabapentin 400 milliGRAM(s) Oral every 8 hours  metoprolol tartrate 50 milliGRAM(s) Oral two times a day  pantoprazole   Suspension 40 milliGRAM(s) Oral before breakfast  polyethylene glycol 3350 17 Gram(s) Oral two times a day  rosuvastatin 10 milliGRAM(s) Oral at bedtime  senna 2 Tablet(s) Oral at bedtime  triamterene 37.5 mG/hydrochlorothiazide 25 mG Tablet 1 Tablet(s) Oral daily    MEDICATIONS  (PRN):  acetaminophen     Tablet .. 650 milliGRAM(s) Oral every 6 hours PRN Temp greater or equal to 38C (100.4F), Mild Pain (1 - 3)  methocarbamol 750 milliGRAM(s) Oral every 8 hours PRN Muscle Spasm    DIET: reg    SUBJECTIVE: Patient evaluated this AM, RLE weakness stable per patient has intermittent pain to R quad. Ambulating w/ walker and assistance. Vitals and labs reviewed, stable.     Vital Signs Last 24 Hrs  T(C): 36.6 (11-29-24 @ 09:21), Max: 37.8 (11-28-24 @ 20:52)  T(F): 97.8 (11-29-24 @ 09:21), Max: 100 (11-28-24 @ 20:52)  HR: 60 (11-29-24 @ 09:21) (60 - 79)  BP: 115/54 (11-29-24 @ 09:21) (115/54 - 151/53)  BP(mean): --  RR: 18 (11-29-24 @ 09:21) (18 - 18)  SpO2: 94% (11-29-24 @ 09:21) (92% - 94%)    PHYSICAL EXAM:  Constitutional: No Acute Distress   Neurological: Awake/Alert, oriented x3 (person, place and time), EOMI, no facial asymmetry, speech fluent, Motor exam upper extremities b/l 5/5 strength equal sensation, RLE HF 4/5, KF/KE 4/5, distally 4+/5, LLE 5/5 except HF 4+/5. sensation intact   Incision: thoraco lumbar incision is clean dry intact sutures. overlying dressing dry  Drains: 1 surgical HMV drains, serosanguineous output   Pulmonary: Clear lungs   Cardiovascular: irreg rate   Gastrointestinal: Soft, Non-tender, Non-distended abdomen +bowel sounds presents   Extremities: No calf tenderness bilaterally, no edema. RUE hand/forearm aging ecchymosis stable does not go beyond previously drawn borders. Pulse 1+ RUE hand is warm with adequate perfusion     LABS:        reviewed                 I&O's Summary  28 Nov 2024 07:01  -  29 Nov 2024 07:00  --------------------------------------------------------  IN: 0 mL / OUT: 115 mL / NET: -115 mL    IMAGING:   < from: MR Lumbar Spine No Cont (11.25.24 @ 12:26) >  ACC: [id number] EXAM:  MR SPINE THORACIC   ORDERED BY: REX COTE   ACC: [id number] EXAM:  MR SPINE LUMBAR   ORDERED BY: REX COTE   PROCEDURE DATE:  11/25/2024    INTERPRETATION:  MRI of the thoracic and lumbar spine without contrast  CLINICAL INDICATION: Status post T11 through pelvic fusion, increasing   right lower extremity weakness    IMPRESSION:  MRI THORACIC SPINE:  Redemonstration of posterior fusion of T11-S1, bilateral sacroiliac   fusion, and multilevel facetectomies.  No gross evidence for abnormal intrinsic cord signal.  No spinal cord compression.  No significant spinal canal stenosis in the thoracic region. Mild   multilevel neural foraminal narrowing lower thoracic region.  Expected postoperative edema throughout the posterior paraspinal soft   tissues from T9 through the inferior extent of the examination.    MRI LUMBAR SPINE:  Redemonstration of posterior fusion of T11-S1, bilateral sacroiliac   fusion, and multilevel facetectomies.    Mild multilevel mild spinal canal stenosis and neural foraminal narrowing   secondary to multilevel degenerative changes characterized by   degenerative disc disease and facet/ligamentous hypertrophy.    Expected postoperative edema throughout the posterior paraspinal soft   tissues at the levels of surgery.  --- End of Report ---  MARIAJOSE MUNOZ MD; Attending Radiologist  This document has been electronically signed. Nov 25 2024  5:02PM  < end of copied text >    MEDICATIONS  (STANDING):  bacitracin   Ointment 1 Application(s) Topical two times a day  bisacodyl Suppository 10 milliGRAM(s) Rectal daily  ceFAZolin   IVPB 2000 milliGRAM(s) IV Intermittent every 8 hours  enoxaparin Injectable 40 milliGRAM(s) SubCutaneous <User Schedule>  gabapentin 400 milliGRAM(s) Oral every 8 hours  metoprolol tartrate 50 milliGRAM(s) Oral two times a day  pantoprazole   Suspension 40 milliGRAM(s) Oral before breakfast  polyethylene glycol 3350 17 Gram(s) Oral two times a day  rosuvastatin 10 milliGRAM(s) Oral at bedtime  senna 2 Tablet(s) Oral at bedtime  triamterene 37.5 mG/hydrochlorothiazide 25 mG Tablet 1 Tablet(s) Oral daily    MEDICATIONS  (PRN):  acetaminophen     Tablet .. 650 milliGRAM(s) Oral every 6 hours PRN Temp greater or equal to 38C (100.4F), Mild Pain (1 - 3)  methocarbamol 750 milliGRAM(s) Oral every 8 hours PRN Muscle Spasm    DIET: reg

## 2024-11-29 NOTE — DISCHARGE NOTE PROVIDER - CARE PROVIDER_API CALL
Jony Allen  Neurosurgery  [address], [address]  [place] [address]  Phone: [phone]  Fax: [phone]  Follow Up Time:     DREW AMAYA  [address], [address]  [place] [address]  Phone: ([phone]  Fax: ([phone]  Follow Up Time:

## 2024-11-30 PROCEDURE — 99232 SBSQ HOSP IP/OBS MODERATE 35: CPT

## 2024-11-30 RX ADMIN — METOPROLOL TARTRATE 50 MILLIGRAM(S): 100 TABLET, FILM COATED ORAL at 05:27

## 2024-11-30 RX ADMIN — ACETAMINOPHEN 500MG 650 MILLIGRAM(S): 500 TABLET, COATED ORAL at 21:17

## 2024-11-30 RX ADMIN — GABAPENTIN 400 MILLIGRAM(S): 300 CAPSULE ORAL at 14:29

## 2024-11-30 RX ADMIN — METOPROLOL TARTRATE 50 MILLIGRAM(S): 100 TABLET, FILM COATED ORAL at 18:25

## 2024-11-30 RX ADMIN — ENOXAPARIN SODIUM 40 MILLIGRAM(S): 30 INJECTION SUBCUTANEOUS at 18:26

## 2024-11-30 RX ADMIN — TRAMADOL HYDROCHLORIDE 25 MILLIGRAM(S): 300 CAPSULE ORAL at 00:22

## 2024-11-30 RX ADMIN — POLYETHYLENE GLYCOL 3350 17 GRAM(S): 17 POWDER, FOR SOLUTION ORAL at 05:28

## 2024-11-30 RX ADMIN — GABAPENTIN 400 MILLIGRAM(S): 300 CAPSULE ORAL at 05:28

## 2024-11-30 RX ADMIN — ACETAMINOPHEN 500MG 650 MILLIGRAM(S): 500 TABLET, COATED ORAL at 22:00

## 2024-11-30 RX ADMIN — GABAPENTIN 400 MILLIGRAM(S): 300 CAPSULE ORAL at 21:17

## 2024-11-30 RX ADMIN — Medication 100 MILLIGRAM(S): at 05:28

## 2024-11-30 RX ADMIN — ROSUVASTATIN CALCIUM 10 MILLIGRAM(S): 5 TABLET, FILM COATED ORAL at 21:17

## 2024-11-30 RX ADMIN — Medication 1 APPLICATION(S): at 05:28

## 2024-11-30 RX ADMIN — TRIAMTERENE AND HYDROCHLOROTHIAZIDE 1 TABLET(S): 25; 37.5 CAPSULE ORAL at 05:28

## 2024-11-30 NOTE — PROGRESS NOTE ADULT - ASSESSMENT
79F PMH AFib/flutter (DOAC stopped for OR), sick sinus syndrome s/p dual chamber PPM, HTN/HLD admitted for T11-pelvis fusion to address Low back pain and coronal scoliosis.     Now s/p T11-pelvis instrumentation and fusion; L4-L5, L5-S1 TLIFs, T12-L1, L1-L2, L2-L3, L3-L4 posterior column osteotomies for coronal scoliosis deformity correction 11/20/2024.     Intraop EBL 1.5L given 3UPRBc, 3U FFP, 2U PLT s/p 2 U PRBC on 11/22.    Worsened RLE  weakness 11/25. Rpt MT T/L spine unremarkable. RLE weakness improved     Plan     Neuro stable HMV drain removed wo difficulty . TLSO brace when OOB ambulating   Vitals stable  Afib/ flutter/ PPM/ HTN  On Metoprolol 50mg q12 & Maxzide 37.5/25mg QD . Xarelto on hold periop.   Pain well controlled On Neurontin 400mg q8 Tramadol prn Robaxin prn   PT/OT acute rehab   DVT ppx  Awaiting rehab placement

## 2024-11-30 NOTE — PROGRESS NOTE ADULT - SUBJECTIVE AND OBJECTIVE BOX
SUBJECTIVE:   Doing well. OOB to chair   OVERNIGHT EVENTS: none    Vital Signs Last 24 Hrs  T(C): 37.1 (30 Nov 2024 09:00), Max: 37.1 (30 Nov 2024 09:00)  T(F): 98.7 (30 Nov 2024 09:00), Max: 98.7 (30 Nov 2024 09:00)  HR: 74 (30 Nov 2024 09:00) (68 - 88)  BP: 132/71 (30 Nov 2024 09:00) (120/52 - 155/70)  BP(mean): --  RR: 18 (30 Nov 2024 09:00) (18 - 18)  SpO2: 95% (30 Nov 2024 09:00) (92% - 95%)    Parameters below as of 30 Nov 2024 09:00  Patient On (Oxygen Delivery Method): room air        PHYSICAL EXAM:    Constitutional: No Acute Distress     Neurological: Awake alert Ox3, Speech clear  Following Commands, Moving all Extremities LUE/LLE/ RUE 5/5 RLE HF 4/5 KF 4/5 KE 4/5  DF 4/5 PF 5/5 . dec sensation b/l toes. Midline back incision healing well   HMV 60cc/ 24 hrs serous drainage     Pulmonary: Clear to Auscultation,     Cardiovascular: S1, S2, Regular rate and rhythm     Gastrointestinal: Soft, Non-tender, Non-distended     LABS:               IMAGING:         MEDICATIONS:    ceFAZolin   IVPB 2000 milliGRAM(s) IV Intermittent every 8 hours  acetaminophen     Tablet .. 650 milliGRAM(s) Oral every 6 hours PRN Temp greater or equal to 38C (100.4F), Mild Pain (1 - 3)  gabapentin 400 milliGRAM(s) Oral every 8 hours  methocarbamol 750 milliGRAM(s) Oral every 8 hours PRN Muscle Spasm  traMADol 25 milliGRAM(s) Oral every 6 hours PRN Moderate Pain (4 - 6)  metoprolol tartrate 50 milliGRAM(s) Oral two times a day  triamterene 37.5 mG/hydrochlorothiazide 25 mG Tablet 1 Tablet(s) Oral daily  bisacodyl Suppository 10 milliGRAM(s) Rectal daily  pantoprazole   Suspension 40 milliGRAM(s) Oral before breakfast  polyethylene glycol 3350 17 Gram(s) Oral two times a day  senna 2 Tablet(s) Oral at bedtime  bacitracin   Ointment 1 Application(s) Topical two times a day  enoxaparin Injectable 40 milliGRAM(s) SubCutaneous <User Schedule>  rosuvastatin 10 milliGRAM(s) Oral at bedtime      DIET:

## 2024-11-30 NOTE — PROGRESS NOTE ADULT - SUBJECTIVE AND OBJECTIVE BOX
Patient is a 79y old  Female who presents with a chief complaint of scoliosis (29 Nov 2024 13:42)      SUBJECTIVE / OVERNIGHT EVENTS: Pt up in chair, feels well.     MEDICATIONS  (STANDING):  bacitracin   Ointment 1 Application(s) Topical two times a day  bisacodyl Suppository 10 milliGRAM(s) Rectal daily  ceFAZolin   IVPB 2000 milliGRAM(s) IV Intermittent every 8 hours  enoxaparin Injectable 40 milliGRAM(s) SubCutaneous <User Schedule>  gabapentin 400 milliGRAM(s) Oral every 8 hours  metoprolol tartrate 50 milliGRAM(s) Oral two times a day  pantoprazole   Suspension 40 milliGRAM(s) Oral before breakfast  polyethylene glycol 3350 17 Gram(s) Oral two times a day  rosuvastatin 10 milliGRAM(s) Oral at bedtime  senna 2 Tablet(s) Oral at bedtime  triamterene 37.5 mG/hydrochlorothiazide 25 mG Tablet 1 Tablet(s) Oral daily    MEDICATIONS  (PRN):  acetaminophen     Tablet .. 650 milliGRAM(s) Oral every 6 hours PRN Temp greater or equal to 38C (100.4F), Mild Pain (1 - 3)  methocarbamol 750 milliGRAM(s) Oral every 8 hours PRN Muscle Spasm  traMADol 25 milliGRAM(s) Oral every 6 hours PRN Moderate Pain (4 - 6)      CAPILLARY BLOOD GLUCOSE        I&O's Summary    29 Nov 2024 07:01  -  30 Nov 2024 07:00  --------------------------------------------------------  IN: 1440 mL / OUT: 50 mL / NET: 1390 mL    30 Nov 2024 07:01  -  30 Nov 2024 12:17  --------------------------------------------------------  IN: 0 mL / OUT: 25 mL / NET: -25 mL        PHYSICAL EXAM:  T(C): 37.1 (11-30-24 @ 09:00), Max: 37.1 (11-30-24 @ 09:00)  HR: 74 (11-30-24 @ 09:00) (68 - 88)  BP: 132/71 (11-30-24 @ 09:00) (120/52 - 155/70)  RR: 18 (11-30-24 @ 09:00) (18 - 18)  SpO2: 95% (11-30-24 @ 09:00) (92% - 95%)    Up in chair, awake, alert, RRR, abdomen soft, good air entry anteriorly, LE weakness 4/5      LABS:                    RADIOLOGY & ADDITIONAL TESTS:    Imaging Personally Reviewed:    Consultant(s) Notes Reviewed:      Care Discussed with Consultants/Other Providers: Nsx

## 2024-12-01 PROCEDURE — 99232 SBSQ HOSP IP/OBS MODERATE 35: CPT

## 2024-12-01 RX ORDER — METHOCARBAMOL 500 MG/1
500 TABLET, FILM COATED ORAL EVERY 8 HOURS
Refills: 0 | Status: DISCONTINUED | OUTPATIENT
Start: 2024-12-01 | End: 2024-12-02

## 2024-12-01 RX ORDER — GABAPENTIN 300 MG/1
400 CAPSULE ORAL
Refills: 0 | Status: DISCONTINUED | OUTPATIENT
Start: 2024-12-01 | End: 2024-12-02

## 2024-12-01 RX ORDER — GABAPENTIN 300 MG/1
600 CAPSULE ORAL AT BEDTIME
Refills: 0 | Status: DISCONTINUED | OUTPATIENT
Start: 2024-12-01 | End: 2024-12-02

## 2024-12-01 RX ORDER — PANTOPRAZOLE SODIUM 40 MG/1
40 TABLET, DELAYED RELEASE ORAL
Refills: 0 | Status: DISCONTINUED | OUTPATIENT
Start: 2024-12-01 | End: 2024-12-02

## 2024-12-01 RX ADMIN — PANTOPRAZOLE SODIUM 40 MILLIGRAM(S): 40 TABLET, DELAYED RELEASE ORAL at 06:16

## 2024-12-01 RX ADMIN — ACETAMINOPHEN 500MG 650 MILLIGRAM(S): 500 TABLET, COATED ORAL at 21:39

## 2024-12-01 RX ADMIN — METOPROLOL TARTRATE 50 MILLIGRAM(S): 100 TABLET, FILM COATED ORAL at 05:23

## 2024-12-01 RX ADMIN — TRAMADOL HYDROCHLORIDE 25 MILLIGRAM(S): 300 CAPSULE ORAL at 06:16

## 2024-12-01 RX ADMIN — ACETAMINOPHEN 500MG 650 MILLIGRAM(S): 500 TABLET, COATED ORAL at 22:09

## 2024-12-01 RX ADMIN — GABAPENTIN 400 MILLIGRAM(S): 300 CAPSULE ORAL at 05:23

## 2024-12-01 RX ADMIN — ROSUVASTATIN CALCIUM 10 MILLIGRAM(S): 5 TABLET, FILM COATED ORAL at 21:39

## 2024-12-01 RX ADMIN — GABAPENTIN 600 MILLIGRAM(S): 300 CAPSULE ORAL at 21:39

## 2024-12-01 RX ADMIN — GABAPENTIN 400 MILLIGRAM(S): 300 CAPSULE ORAL at 17:25

## 2024-12-01 RX ADMIN — GABAPENTIN 400 MILLIGRAM(S): 300 CAPSULE ORAL at 13:10

## 2024-12-01 RX ADMIN — METOPROLOL TARTRATE 50 MILLIGRAM(S): 100 TABLET, FILM COATED ORAL at 17:23

## 2024-12-01 RX ADMIN — TRIAMTERENE AND HYDROCHLOROTHIAZIDE 1 TABLET(S): 25; 37.5 CAPSULE ORAL at 05:23

## 2024-12-01 RX ADMIN — TRAMADOL HYDROCHLORIDE 25 MILLIGRAM(S): 300 CAPSULE ORAL at 05:23

## 2024-12-01 RX ADMIN — ENOXAPARIN SODIUM 40 MILLIGRAM(S): 30 INJECTION SUBCUTANEOUS at 17:22

## 2024-12-01 NOTE — PROGRESS NOTE ADULT - SUBJECTIVE AND OBJECTIVE BOX
Patient is a 79y old  Female who presents with a chief complaint of scoliosis (29 Nov 2024 13:42)      SUBJECTIVE / OVERNIGHT EVENTS: Up in chair, feels R leg is weaker today.     MEDICATIONS  (STANDING):  bisacodyl Suppository 10 milliGRAM(s) Rectal daily  enoxaparin Injectable 40 milliGRAM(s) SubCutaneous <User Schedule>  gabapentin 400 milliGRAM(s) Oral every 8 hours  metoprolol tartrate 50 milliGRAM(s) Oral two times a day  pantoprazole    Tablet 40 milliGRAM(s) Oral before breakfast  polyethylene glycol 3350 17 Gram(s) Oral two times a day  rosuvastatin 10 milliGRAM(s) Oral at bedtime  senna 2 Tablet(s) Oral at bedtime  triamterene 37.5 mG/hydrochlorothiazide 25 mG Tablet 1 Tablet(s) Oral daily    MEDICATIONS  (PRN):  acetaminophen     Tablet .. 650 milliGRAM(s) Oral every 6 hours PRN Temp greater or equal to 38C (100.4F), Mild Pain (1 - 3)  methocarbamol 750 milliGRAM(s) Oral every 8 hours PRN Muscle Spasm  traMADol 25 milliGRAM(s) Oral every 6 hours PRN Moderate Pain (4 - 6)      CAPILLARY BLOOD GLUCOSE        I&O's Summary    30 Nov 2024 07:01  -  01 Dec 2024 07:00  --------------------------------------------------------  IN: 0 mL / OUT: 25 mL / NET: -25 mL        PHYSICAL EXAM:  T(C): 36.5 (12-01-24 @ 05:31), Max: 36.9 (11-30-24 @ 16:38)  HR: 85 (12-01-24 @ 05:31) (72 - 87)  BP: 130/65 (12-01-24 @ 05:31) (111/58 - 139/73)  RR: 18 (12-01-24 @ 05:31) (18 - 18)  SpO2: 92% (12-01-24 @ 05:31) (92% - 97%)    Up in chair, RRR, abdomen soft, good air entry anteriorly, LE weakness 4/5    LABS:                    RADIOLOGY & ADDITIONAL TESTS:    Imaging Personally Reviewed:    Consultant(s) Notes Reviewed:      Care Discussed with Consultants/Other Providers:

## 2024-12-01 NOTE — PROGRESS NOTE ADULT - ASSESSMENT
79F PMH AFib/flutter (DOAC stopped for OR), sick sinus syndrome s/p dual chamber PPM, HTN/HLD admitted for T11-pelvis fusion to address Low back pain and coronal scoliosis.     Now s/p T11-pelvis instrumentation and fusion; L4-L5, L5-S1 TLIFs, T12-L1, L1-L2, L2-L3, L3-L4 posterior column osteotomies for coronal scoliosis deformity correction 11/20/2024.     Intraop EBL 1.5L given 3UPRBc, 3U FFP, 2U PLT s/p 2 U PRBC on 11/22.    Worsened RLE  weakness 11/25. Rpt MT T/L spine unremarkable. RLE weakness improved     Plan     Neuro stable. TLSO brace when OOB ambulating   Vitals stable  Afib/ flutter/ PPM/ HTN  On Metoprolol 50mg q12 & Maxzide 37.5/25mg QD . May restart Xarelto 12/9/24   Pain well controlled On Neurontin & Tramadol prn Robaxin prn   am labs   PT/OT acute rehab. Possible d/c to [facility] rehab in am   DVT ppx

## 2024-12-01 NOTE — PROGRESS NOTE ADULT - SUBJECTIVE AND OBJECTIVE BOX
SUBJECTIVE:   Ambulated w PT. + BM   OVERNIGHT EVENTS: none    Vital Signs Last 24 Hrs  T(C): 36.5 (01 Dec 2024 05:31), Max: 36.9 (30 Nov 2024 16:38)  T(F): 97.7 (01 Dec 2024 05:31), Max: 98.4 (30 Nov 2024 16:38)  HR: 85 (01 Dec 2024 05:31) (72 - 87)  BP: 130/65 (01 Dec 2024 05:31) (111/58 - 139/73)  BP(mean): --  RR: 18 (01 Dec 2024 05:31) (18 - 18)  SpO2: 92% (01 Dec 2024 05:31) (92% - 97%)    Parameters below as of 01 Dec 2024 05:31  Patient On (Oxygen Delivery Method): room air        PHYSICAL EXAM:    Neurological: Awake alert Ox3, Speech clear  Following Commands, Moving all Extremities LUE/LLE/ RUE 5/5 RLE HF 4/5 KF 4/5 KE 4/5  DF 4/5 PF 5/5 . dec sensation b/l toes. Midline back incision healing well     Pulmonary: Clear to Auscultation,     Cardiovascular: S1, S2, Regular rate and rhythm     Gastrointestinal: Soft, Non-tender, Non-distended     LABS:             IMAGING:         MEDICATIONS:    acetaminophen     Tablet .. 650 milliGRAM(s) Oral every 6 hours PRN Temp greater or equal to 38C (100.4F), Mild Pain (1 - 3)  gabapentin 400 milliGRAM(s) Oral <User Schedule>  gabapentin 600 milliGRAM(s) Oral at bedtime  methocarbamol 500 milliGRAM(s) Oral every 8 hours PRN Muscle Spasm  traMADol 25 milliGRAM(s) Oral every 6 hours PRN Moderate Pain (4 - 6)  metoprolol tartrate 50 milliGRAM(s) Oral two times a day  triamterene 37.5 mG/hydrochlorothiazide 25 mG Tablet 1 Tablet(s) Oral daily  bisacodyl Suppository 10 milliGRAM(s) Rectal daily  pantoprazole    Tablet 40 milliGRAM(s) Oral before breakfast  polyethylene glycol 3350 17 Gram(s) Oral two times a day  senna 2 Tablet(s) Oral at bedtime  enoxaparin Injectable 40 milliGRAM(s) SubCutaneous <User Schedule>  rosuvastatin 10 milliGRAM(s) Oral at bedtime      DIET:

## 2024-12-01 NOTE — PROGRESS NOTE ADULT - ASSESSMENT
79F PMH AFib/flutter (DOAC stopped for OR), sick sinus syndrome s/p dual chamber PPM, HTN/HLD admitted for T11-pelvis fusion to address Low back pain and coronal scoliosis.     Now s/p T11-pelvis instrumentation and fusion; L4-L5, L5-S1 TLIFs, T12-L1, L1-L2, L2-L3, L3-L4 posterior column osteotomies for coronal scoliosis deformity correction 11/20/2024.     Intraop EBL 1.5L given 3UPRBc, 3U FFP, 2U PLT s/p 2 U PRBC on 11/22.    Persistent R sided weakness post procedure.

## 2024-12-02 ENCOUNTER — TRANSCRIPTION ENCOUNTER (OUTPATIENT)
Age: 79
End: 2024-12-02

## 2024-12-02 ENCOUNTER — INPATIENT (INPATIENT)
Facility: HOSPITAL | Age: 79
LOS: 11 days | Discharge: HOME | DRG: 552 | End: 2024-12-14
Attending: PHYSICAL MEDICINE & REHABILITATION | Admitting: PHYSICAL MEDICINE & REHABILITATION
Payer: MEDICARE

## 2024-12-02 VITALS
TEMPERATURE: 99 F | OXYGEN SATURATION: 98 % | SYSTOLIC BLOOD PRESSURE: 145 MMHG | RESPIRATION RATE: 18 BRPM | DIASTOLIC BLOOD PRESSURE: 75 MMHG | HEART RATE: 78 BPM

## 2024-12-02 VITALS
RESPIRATION RATE: 18 BRPM | DIASTOLIC BLOOD PRESSURE: 79 MMHG | SYSTOLIC BLOOD PRESSURE: 132 MMHG | HEART RATE: 80 BPM | WEIGHT: 163.58 LBS | HEIGHT: 64 IN | OXYGEN SATURATION: 97 % | TEMPERATURE: 99 F

## 2024-12-02 DIAGNOSIS — Z98.890 OTHER SPECIFIED POSTPROCEDURAL STATES: Chronic | ICD-10-CM

## 2024-12-02 DIAGNOSIS — Z95.0 PRESENCE OF CARDIAC PACEMAKER: Chronic | ICD-10-CM

## 2024-12-02 DIAGNOSIS — M41.9 SCOLIOSIS, UNSPECIFIED: ICD-10-CM

## 2024-12-02 DIAGNOSIS — Z98.82 BREAST IMPLANT STATUS: Chronic | ICD-10-CM

## 2024-12-02 PROBLEM — I10 ESSENTIAL (PRIMARY) HYPERTENSION: Chronic | Status: ACTIVE | Noted: 2024-11-01

## 2024-12-02 PROBLEM — Z86.79 PERSONAL HISTORY OF OTHER DISEASES OF THE CIRCULATORY SYSTEM: Chronic | Status: ACTIVE | Noted: 2024-11-01

## 2024-12-02 PROBLEM — Z86.39 PERSONAL HISTORY OF OTHER ENDOCRINE, NUTRITIONAL AND METABOLIC DISEASE: Chronic | Status: ACTIVE | Noted: 2024-11-01

## 2024-12-02 PROBLEM — I48.91 UNSPECIFIED ATRIAL FIBRILLATION: Chronic | Status: ACTIVE | Noted: 2024-11-01

## 2024-12-02 LAB
ANION GAP SERPL CALC-SCNC: 15 MMOL/L — SIGNIFICANT CHANGE UP (ref 5–17)
BUN SERPL-MCNC: 17 MG/DL — SIGNIFICANT CHANGE UP (ref 7–23)
CALCIUM SERPL-MCNC: 9.4 MG/DL — SIGNIFICANT CHANGE UP (ref 8.4–10.5)
CHLORIDE SERPL-SCNC: 95 MMOL/L — LOW (ref 96–108)
CO2 SERPL-SCNC: 25 MMOL/L — SIGNIFICANT CHANGE UP (ref 22–31)
CREAT SERPL-MCNC: 0.62 MG/DL — SIGNIFICANT CHANGE UP (ref 0.5–1.3)
EGFR: 91 ML/MIN/1.73M2 — SIGNIFICANT CHANGE UP
GLUCOSE SERPL-MCNC: 120 MG/DL — HIGH (ref 70–99)
HCT VFR BLD CALC: 34.2 % — LOW (ref 34.5–45)
HGB BLD-MCNC: 11.2 G/DL — LOW (ref 11.5–15.5)
MCHC RBC-ENTMCNC: 28.6 PG — SIGNIFICANT CHANGE UP (ref 27–34)
MCHC RBC-ENTMCNC: 32.7 G/DL — SIGNIFICANT CHANGE UP (ref 32–36)
MCV RBC AUTO: 87.5 FL — SIGNIFICANT CHANGE UP (ref 80–100)
NRBC # BLD: 0 /100 WBCS — SIGNIFICANT CHANGE UP (ref 0–0)
PLATELET # BLD AUTO: 352 K/UL — SIGNIFICANT CHANGE UP (ref 150–400)
POTASSIUM SERPL-MCNC: 3.5 MMOL/L — SIGNIFICANT CHANGE UP (ref 3.5–5.3)
POTASSIUM SERPL-SCNC: 3.5 MMOL/L — SIGNIFICANT CHANGE UP (ref 3.5–5.3)
RBC # BLD: 3.91 M/UL — SIGNIFICANT CHANGE UP (ref 3.8–5.2)
RBC # FLD: 16.4 % — HIGH (ref 10.3–14.5)
SARS-COV-2 RNA SPEC QL NAA+PROBE: SIGNIFICANT CHANGE UP
SODIUM SERPL-SCNC: 135 MMOL/L — SIGNIFICANT CHANGE UP (ref 135–145)
WBC # BLD: 9.32 K/UL — SIGNIFICANT CHANGE UP (ref 3.8–10.5)
WBC # FLD AUTO: 9.32 K/UL — SIGNIFICANT CHANGE UP (ref 3.8–10.5)

## 2024-12-02 PROCEDURE — 83605 ASSAY OF LACTIC ACID: CPT

## 2024-12-02 PROCEDURE — 72131 CT LUMBAR SPINE W/O DYE: CPT | Mod: MC

## 2024-12-02 PROCEDURE — P9073: CPT

## 2024-12-02 PROCEDURE — 31720 CLEARANCE OF AIRWAYS: CPT

## 2024-12-02 PROCEDURE — 99232 SBSQ HOSP IP/OBS MODERATE 35: CPT

## 2024-12-02 PROCEDURE — P9100: CPT

## 2024-12-02 PROCEDURE — 93005 ELECTROCARDIOGRAM TRACING: CPT

## 2024-12-02 PROCEDURE — 84132 ASSAY OF SERUM POTASSIUM: CPT

## 2024-12-02 PROCEDURE — 86901 BLOOD TYPING SEROLOGIC RH(D): CPT

## 2024-12-02 PROCEDURE — 92610 EVALUATE SWALLOWING FUNCTION: CPT

## 2024-12-02 PROCEDURE — 93970 EXTREMITY STUDY: CPT | Mod: 26

## 2024-12-02 PROCEDURE — 84100 ASSAY OF PHOSPHORUS: CPT

## 2024-12-02 PROCEDURE — P9037: CPT

## 2024-12-02 PROCEDURE — 72148 MRI LUMBAR SPINE W/O DYE: CPT | Mod: MC

## 2024-12-02 PROCEDURE — 97530 THERAPEUTIC ACTIVITIES: CPT

## 2024-12-02 PROCEDURE — 80048 BASIC METABOLIC PNL TOTAL CA: CPT

## 2024-12-02 PROCEDURE — 86923 COMPATIBILITY TEST ELECTRIC: CPT

## 2024-12-02 PROCEDURE — P9045: CPT

## 2024-12-02 PROCEDURE — 93931 UPPER EXTREMITY STUDY: CPT

## 2024-12-02 PROCEDURE — 97162 PT EVAL MOD COMPLEX 30 MIN: CPT

## 2024-12-02 PROCEDURE — 36430 TRANSFUSION BLD/BLD COMPNT: CPT

## 2024-12-02 PROCEDURE — C9399: CPT

## 2024-12-02 PROCEDURE — 92526 ORAL FUNCTION THERAPY: CPT

## 2024-12-02 PROCEDURE — 93971 EXTREMITY STUDY: CPT

## 2024-12-02 PROCEDURE — 85610 PROTHROMBIN TIME: CPT

## 2024-12-02 PROCEDURE — P9016: CPT

## 2024-12-02 PROCEDURE — 97116 GAIT TRAINING THERAPY: CPT

## 2024-12-02 PROCEDURE — 82803 BLOOD GASES ANY COMBINATION: CPT

## 2024-12-02 PROCEDURE — 72146 MRI CHEST SPINE W/O DYE: CPT | Mod: MC

## 2024-12-02 PROCEDURE — 82435 ASSAY OF BLOOD CHLORIDE: CPT

## 2024-12-02 PROCEDURE — 97110 THERAPEUTIC EXERCISES: CPT

## 2024-12-02 PROCEDURE — 94640 AIRWAY INHALATION TREATMENT: CPT

## 2024-12-02 PROCEDURE — 82947 ASSAY GLUCOSE BLOOD QUANT: CPT

## 2024-12-02 PROCEDURE — 83735 ASSAY OF MAGNESIUM: CPT

## 2024-12-02 PROCEDURE — 85018 HEMOGLOBIN: CPT

## 2024-12-02 PROCEDURE — 72080 X-RAY EXAM THORACOLMB 2/> VW: CPT

## 2024-12-02 PROCEDURE — 82962 GLUCOSE BLOOD TEST: CPT

## 2024-12-02 PROCEDURE — C1889: CPT

## 2024-12-02 PROCEDURE — 85730 THROMBOPLASTIN TIME PARTIAL: CPT

## 2024-12-02 PROCEDURE — 97535 SELF CARE MNGMENT TRAINING: CPT

## 2024-12-02 PROCEDURE — 82330 ASSAY OF CALCIUM: CPT

## 2024-12-02 PROCEDURE — 84295 ASSAY OF SERUM SODIUM: CPT

## 2024-12-02 PROCEDURE — 71045 X-RAY EXAM CHEST 1 VIEW: CPT

## 2024-12-02 PROCEDURE — 94003 VENT MGMT INPAT SUBQ DAY: CPT

## 2024-12-02 PROCEDURE — 85014 HEMATOCRIT: CPT

## 2024-12-02 PROCEDURE — 97166 OT EVAL MOD COMPLEX 45 MIN: CPT

## 2024-12-02 PROCEDURE — 93970 EXTREMITY STUDY: CPT

## 2024-12-02 PROCEDURE — 86850 RBC ANTIBODY SCREEN: CPT

## 2024-12-02 PROCEDURE — 93306 TTE W/DOPPLER COMPLETE: CPT

## 2024-12-02 PROCEDURE — 72128 CT CHEST SPINE W/O DYE: CPT | Mod: MC

## 2024-12-02 PROCEDURE — 86900 BLOOD TYPING SEROLOGIC ABO: CPT

## 2024-12-02 PROCEDURE — P9059: CPT

## 2024-12-02 PROCEDURE — 76000 FLUOROSCOPY <1 HR PHYS/QHP: CPT

## 2024-12-02 PROCEDURE — 85027 COMPLETE CBC AUTOMATED: CPT

## 2024-12-02 PROCEDURE — 85025 COMPLETE CBC W/AUTO DIFF WBC: CPT

## 2024-12-02 PROCEDURE — 94002 VENT MGMT INPAT INIT DAY: CPT

## 2024-12-02 PROCEDURE — 36415 COLL VENOUS BLD VENIPUNCTURE: CPT

## 2024-12-02 PROCEDURE — C1769: CPT

## 2024-12-02 PROCEDURE — C1713: CPT

## 2024-12-02 RX ORDER — METOPROLOL TARTRATE 100 MG/1
50 TABLET, FILM COATED ORAL
Refills: 0 | Status: DISCONTINUED | OUTPATIENT
Start: 2024-12-03 | End: 2024-12-14

## 2024-12-02 RX ORDER — ROSUVASTATIN CALCIUM 5 MG/1
10 TABLET, FILM COATED ORAL AT BEDTIME
Refills: 0 | Status: DISCONTINUED | OUTPATIENT
Start: 2024-12-02 | End: 2024-12-14

## 2024-12-02 RX ORDER — SENNOSIDES 8.6 MG
2 TABLET ORAL
Qty: 0 | Refills: 0 | DISCHARGE
Start: 2024-12-02

## 2024-12-02 RX ORDER — ENOXAPARIN SODIUM 30 MG/.3ML
40 INJECTION SUBCUTANEOUS
Refills: 0 | Status: DISCONTINUED | OUTPATIENT
Start: 2024-12-03 | End: 2024-12-02

## 2024-12-02 RX ORDER — METHOCARBAMOL 500 MG/1
1 TABLET, FILM COATED ORAL
Qty: 0 | Refills: 0 | DISCHARGE
Start: 2024-12-02

## 2024-12-02 RX ORDER — POTASSIUM CHLORIDE 600 MG/1
20 TABLET, EXTENDED RELEASE ORAL
Refills: 0 | Status: COMPLETED | OUTPATIENT
Start: 2024-12-02 | End: 2024-12-02

## 2024-12-02 RX ORDER — TRIAMTERENE AND HYDROCHLOROTHIAZIDE 25; 37.5 MG/1; MG/1
1 CAPSULE ORAL DAILY
Refills: 0 | Status: DISCONTINUED | OUTPATIENT
Start: 2024-12-03 | End: 2024-12-14

## 2024-12-02 RX ORDER — POLYETHYLENE GLYCOL 3350 17 G/17G
17 POWDER, FOR SOLUTION ORAL
Refills: 0 | Status: DISCONTINUED | OUTPATIENT
Start: 2024-12-02 | End: 2024-12-14

## 2024-12-02 RX ORDER — ENOXAPARIN SODIUM 30 MG/.3ML
40 INJECTION SUBCUTANEOUS
Refills: 0 | Status: DISCONTINUED | OUTPATIENT
Start: 2024-12-03 | End: 2024-12-08

## 2024-12-02 RX ORDER — METHOCARBAMOL 500 MG/1
500 TABLET, FILM COATED ORAL EVERY 8 HOURS
Refills: 0 | Status: DISCONTINUED | OUTPATIENT
Start: 2024-12-02 | End: 2024-12-14

## 2024-12-02 RX ORDER — GABAPENTIN 300 MG/1
600 CAPSULE ORAL AT BEDTIME
Refills: 0 | Status: DISCONTINUED | OUTPATIENT
Start: 2024-12-02 | End: 2024-12-05

## 2024-12-02 RX ORDER — SENNOSIDES 8.6 MG
2 TABLET ORAL AT BEDTIME
Refills: 0 | Status: DISCONTINUED | OUTPATIENT
Start: 2024-12-02 | End: 2024-12-14

## 2024-12-02 RX ORDER — ACETAMINOPHEN 500MG 500 MG/1
2 TABLET, COATED ORAL
Qty: 0 | Refills: 0 | DISCHARGE
Start: 2024-12-02

## 2024-12-02 RX ORDER — GABAPENTIN 300 MG/1
1 CAPSULE ORAL
Qty: 0 | Refills: 0 | DISCHARGE
Start: 2024-12-02

## 2024-12-02 RX ORDER — PANTOPRAZOLE SODIUM 40 MG/1
40 TABLET, DELAYED RELEASE ORAL
Refills: 0 | Status: DISCONTINUED | OUTPATIENT
Start: 2024-12-03 | End: 2024-12-14

## 2024-12-02 RX ORDER — PANTOPRAZOLE SODIUM 40 MG/1
1 TABLET, DELAYED RELEASE ORAL
Qty: 0 | Refills: 0 | DISCHARGE
Start: 2024-12-02

## 2024-12-02 RX ORDER — TRAMADOL HYDROCHLORIDE 300 MG/1
25 CAPSULE ORAL EVERY 6 HOURS
Refills: 0 | Status: DISCONTINUED | OUTPATIENT
Start: 2024-12-02 | End: 2024-12-03

## 2024-12-02 RX ORDER — RIVAROXABAN 10 MG/1
20 TABLET, FILM COATED ORAL DAILY
Refills: 0 | Status: DISCONTINUED | OUTPATIENT
Start: 2024-12-09 | End: 2024-12-14

## 2024-12-02 RX ORDER — POLYETHYLENE GLYCOL 3350 17 G/17G
17 POWDER, FOR SOLUTION ORAL
Qty: 0 | Refills: 0 | DISCHARGE
Start: 2024-12-02

## 2024-12-02 RX ORDER — ACETAMINOPHEN 500MG 500 MG/1
650 TABLET, COATED ORAL EVERY 6 HOURS
Refills: 0 | Status: DISCONTINUED | OUTPATIENT
Start: 2024-12-02 | End: 2024-12-14

## 2024-12-02 RX ORDER — TRAMADOL HYDROCHLORIDE 300 MG/1
0.5 CAPSULE ORAL
Qty: 0 | Refills: 0 | DISCHARGE
Start: 2024-12-02

## 2024-12-02 RX ORDER — ENOXAPARIN SODIUM 30 MG/.3ML
40 INJECTION SUBCUTANEOUS
Qty: 0 | Refills: 0 | DISCHARGE
Start: 2024-12-02

## 2024-12-02 RX ORDER — GABAPENTIN 300 MG/1
400 CAPSULE ORAL
Refills: 0 | Status: DISCONTINUED | OUTPATIENT
Start: 2024-12-03 | End: 2024-12-05

## 2024-12-02 RX ORDER — METOPROLOL TARTRATE 100 MG/1
1 TABLET, FILM COATED ORAL
Qty: 0 | Refills: 0 | DISCHARGE
Start: 2024-12-02

## 2024-12-02 RX ADMIN — POTASSIUM CHLORIDE 20 MILLIEQUIVALENT(S): 600 TABLET, EXTENDED RELEASE ORAL at 10:57

## 2024-12-02 RX ADMIN — TRAMADOL HYDROCHLORIDE 25 MILLIGRAM(S): 300 CAPSULE ORAL at 22:47

## 2024-12-02 RX ADMIN — METOPROLOL TARTRATE 50 MILLIGRAM(S): 100 TABLET, FILM COATED ORAL at 05:28

## 2024-12-02 RX ADMIN — METHOCARBAMOL 500 MILLIGRAM(S): 500 TABLET, FILM COATED ORAL at 21:28

## 2024-12-02 RX ADMIN — ROSUVASTATIN CALCIUM 10 MILLIGRAM(S): 5 TABLET, FILM COATED ORAL at 21:28

## 2024-12-02 RX ADMIN — TRIAMTERENE AND HYDROCHLOROTHIAZIDE 1 TABLET(S): 25; 37.5 CAPSULE ORAL at 05:28

## 2024-12-02 RX ADMIN — Medication 2 TABLET(S): at 21:29

## 2024-12-02 RX ADMIN — METOPROLOL TARTRATE 50 MILLIGRAM(S): 100 TABLET, FILM COATED ORAL at 17:16

## 2024-12-02 RX ADMIN — TRAMADOL HYDROCHLORIDE 25 MILLIGRAM(S): 300 CAPSULE ORAL at 21:28

## 2024-12-02 RX ADMIN — ENOXAPARIN SODIUM 40 MILLIGRAM(S): 30 INJECTION SUBCUTANEOUS at 17:15

## 2024-12-02 RX ADMIN — POTASSIUM CHLORIDE 20 MILLIEQUIVALENT(S): 600 TABLET, EXTENDED RELEASE ORAL at 09:14

## 2024-12-02 RX ADMIN — TRAMADOL HYDROCHLORIDE 25 MILLIGRAM(S): 300 CAPSULE ORAL at 06:00

## 2024-12-02 RX ADMIN — GABAPENTIN 400 MILLIGRAM(S): 300 CAPSULE ORAL at 10:56

## 2024-12-02 RX ADMIN — GABAPENTIN 600 MILLIGRAM(S): 300 CAPSULE ORAL at 21:28

## 2024-12-02 RX ADMIN — PANTOPRAZOLE SODIUM 40 MILLIGRAM(S): 40 TABLET, DELAYED RELEASE ORAL at 05:28

## 2024-12-02 RX ADMIN — TRAMADOL HYDROCHLORIDE 25 MILLIGRAM(S): 300 CAPSULE ORAL at 05:28

## 2024-12-02 NOTE — H&P ADULT - HISTORY OF PRESENT ILLNESS
80 y/o F w/ HTN, HLD, Afib atrial flutter, sick sinus syndrome/ haert block,  atrial flutter s/p cardiac ablation x1 ( 2016), PPM  dependent dual chamber status (2013), breast augmentation, and chronic lower back pain presents to [facility] on 11/20 w/   c/o non radiating lower back pain,  BLE weakness (R>L) and difficulty ambulating.  S/p s/p T11-pelvis instrumentation and fusion; L4-L5, L5-S1 TLIFs, T12-L1, L1-L2, L2-L3, L3-L4 posterior column osteotomies for coronal scoliosis deformity correction with Dr. Allen on 11/20. Post op course c/b post op anemia requiring multiple pRBC, airway edema s/p extubation, treated w/ steroids, dysphagia, persistent R sided weakness/paresthesias post op (XR stable, MRI stable posterior fusion, no spinal cord compression; self limiting). Patient optimized and was evaluated by PM&R and therapy for functional deficits, gait/ADL impairments and acute rehabilitation was recommended. Patient was cleared for discharge to [facility] IRU on 12/2/24. 80 y/o F w/ HTN, HLD, Afib atrial flutter, sick sinus syndrome/ haert block,  atrial flutter s/p cardiac ablation x1 ( 2016), PPM  dependent dual chamber status (2013), breast augmentation, and chronic lower back pain presents to [facility] on 11/20 w/c/o non radiating lower back pain,  BLE weakness (R>L) and difficulty ambulating.  S/p s/p T11-pelvis instrumentation and fusion; L4-L5, L5-S1 TLIFs, T12-L1, L1-L2, L2-L3, L3-L4 posterior column osteotomies for coronal scoliosis deformity correction with Dr. Allen on 11/20. Post op course c/b post op anemia requiring multiple pRBC, airway edema s/p extubation, treated w/ steroids, dysphagia, persistent R sided weakness/paresthesias post op (XR stable, MRI stable posterior fusion, no spinal cord compression; self limiting). Patient optimized and was evaluated by PM&R and therapy for functional deficits, gait/ADL impairments and acute rehabilitation was recommended. Patient was cleared for discharge to [facility] IRU on 12/2/24. 80 y/o F w/ HTN, HLD, Afib atrial flutter, sick sinus syndrome/ heart block,  atrial flutter s/p cardiac ablation x1 ( 2016), PPM  dependent dual chamber status (2013), breast augmentation, and chronic lower back pain presents to [facility] on 11/20 w/c/o non radiating lower back pain,  BLE weakness (R>L) and difficulty ambulating.  S/p s/p T11-pelvis instrumentation and fusion; L4-L5, L5-S1 TLIFs, T12-L1, L1-L2, L2-L3, L3-L4 posterior column osteotomies for coronal scoliosis deformity correction with Dr. Allen on 11/20. Post op course c/b post op anemia requiring multiple pRBC, airway edema s/p extubation, treated w/ steroids, dysphagia, persistent R sided weakness/BLE w/paresthesias post op (XR stable, MRI stable posterior fusion, no spinal cord compression; self limiting). Patient optimized and was evaluated by PM&R and therapy for functional deficits, gait/ADL impairments and acute rehabilitation was recommended. Patient was cleared for discharge to [facility] IRU on 12/2/24. Mrs. Ct Levine 80 y/o F w/ HTN, HLD, Afib atrial flutter, sick sinus syndrome/heart block, atrial flutter s/p cardiac ablation x1 ( 2016), PPM dependent dual chamber status (2013), breast augmentation, and chronic lower back pain who presented to [facility] on 11/20 w/c/o non radiating lower back pain, BLE weakness (R>L) and difficulty ambulating. Surgical management was recommended and she is s/p T11-pelvis instrumentation and fusion; L4-L5, L5-S1 TLIFs, T12-L1, L1-L2, L2-L3, L3-L4 posterior column osteotomies for coronal scoliosis deformity correction with Dr. Allen on 11/20. Post op course c/b post op anemia requiring multiple pRBC, airway edema s/p extubation, treated w/ steroids, dysphagia, persistent R sided weakness/BLE w/paresthesias post op (XR stable, MRI stable posterior fusion, no spinal cord compression; self limiting). Patient was evaluated by PM&R and therapy for functional deficits, gait/ADL impairments and acute rehabilitation was recommended. Patient was cleared for discharge to [facility] IRF on 12/2/24.

## 2024-12-02 NOTE — PROGRESS NOTE ADULT - SUBJECTIVE AND OBJECTIVE BOX
Patient seen for rehab follow up  CC:  spine surgery     no new complaints   participating with therapy     REVIEW OF SYSTEMS  Constitutional - No fever,  No fatigue  HEENT - No vertigo, No neck pain  Neurological - No headaches, No memory loss  Psychiatric - No depression, No anxiety    FUNCTIONAL PROGRESS  SLP 11/27  regular solids, thin liquids     PT 12/1  transfers min assist with RW   gait min assist with RW x 30 feet     OT 12/2  bed mobility min assist   transfers min assist with RW   dressing mod assist     VITALS  T(C): 36.6 (12-02-24 @ 08:00), Max: 37.2 (12-01-24 @ 15:56)  HR: 74 (12-02-24 @ 08:00) (69 - 84)  BP: 132/68 (12-02-24 @ 08:00) (114/67 - 139/81)  RR: 18 (12-02-24 @ 08:00) (18 - 18)  SpO2: 98% (12-02-24 @ 08:00) (93% - 98%)  Wt(kg): --    MEDICATIONS   acetaminophen     Tablet .. 650 milliGRAM(s) every 6 hours PRN  bisacodyl Suppository 10 milliGRAM(s) daily  enoxaparin Injectable 40 milliGRAM(s) <User Schedule>  gabapentin 400 milliGRAM(s) <User Schedule>  gabapentin 600 milliGRAM(s) at bedtime  methocarbamol 500 milliGRAM(s) every 8 hours PRN  metoprolol tartrate 50 milliGRAM(s) two times a day  pantoprazole    Tablet 40 milliGRAM(s) before breakfast  polyethylene glycol 3350 17 Gram(s) two times a day  potassium chloride    Tablet ER 20 milliEquivalent(s) every 2 hours  rosuvastatin 10 milliGRAM(s) at bedtime  senna 2 Tablet(s) at bedtime  traMADol 25 milliGRAM(s) every 6 hours PRN  triamterene 37.5 mG/hydrochlorothiazide 25 mG Tablet 1 Tablet(s) daily      RECENT LABS - Reviewed                        11.2   9.32  )-----------( 352      ( 02 Dec 2024 08:13 )             34.2     12-02    135  |  95[L]  |  17  ----------------------------<  120[H]  3.5   |  25  |  0.62    Ca    9.4      02 Dec 2024 08:13        Urinalysis Basic - ( 02 Dec 2024 08:13 )    Color: x / Appearance: x / SG: x / pH: x  Gluc: 120 mg/dL / Ketone: x  / Bili: x / Urobili: x   Blood: x / Protein: x / Nitrite: x   Leuk Esterase: x / RBC: x / WBC x   Sq Epi: x / Non Sq Epi: x / Bacteria: x          < from: Xray Spine Thoracolumbar Junction 2 Views (11.20.24 @ 17:41) >    IMPRESSION:  Localizing melissa superimposes inferior aspect of L3 vertebral body.    Slight grade 1 anterolisthesis of L4 and L5 and slight retrolistheses of   L1 on L2 and L2 on L3 without spondylolysis defects and with variable   disc narrowing also noted.    Partially visualized distal ends of cardiac pacing wire leads over   inferior heart shadow at superior image margin.    Also correlate with preoperative workup and intraoperative findings.    --- End of Report ---    < end of copied text >      < from: MR Lumbar Spine No Cont (11.25.24 @ 12:26) >    IMPRESSION:    MRI THORACIC SPINE:  Redemonstration of posterior fusion of T11-S1, bilateral sacroiliac   fusion, and multilevel facetectomies.    No gross evidence for abnormal intrinsic cord signal.    No spinal cord compression.    No significant spinal canal stenosis in the thoracic region. Mild   multilevel neural foraminal narrowing lower thoracic region.    Expected postoperative edema throughout the posterior paraspinal soft   tissues from T9 through the inferior extent of the examination.    MRI LUMBAR SPINE:  Redemonstration of posterior fusion of T11-S1, bilateral sacroiliac   fusion, and multilevel facetectomies.    Mild multilevel mild spinal canal stenosis and neural foraminal narrowing   secondary to multilevel degenerative changes characterized by   degenerative disc disease and facet/ligamentous hypertrophy.    Expected postoperative edema throughout the posterior paraspinal soft   tissues at the levels of surgery.        < end of copied text >    ----------------------------------------------------------------------------------------  PHYSICAL EXAM  Constitutional - NAD, Comfortable  Chest - Breathing comfortably on room air   Cardiovascular - S1S2   Extremities - +LE edema, RUE ecchymosis   Neurologic Exam -    follows commands                 Cognitive - Awake, Alert, AAO to self, place, date, year, situation     Communication - Fluent, No dysarthria        Motor -                     LEFT    UE - ShAB 5/5, EF 5/5, EE 5/5, WE 5/5,  5/5                    RIGHT UE - ShAB 5/5, EF 5/5, EE 5/5, WE 5/5,  5/5                    LEFT    LE - HF 5/5, KE 5/5, DF 5/5, PF 5/5                    RIGHT LE - 4/5         Sensory - decreased RLE      Psychiatric - Mood stable, Affect WNL  ----------------------------------------------------------------------------------------  ASSESSMENT/PLAN  79yFemale h/o  with functional deficits after T11-pelvis fusion, L4-5, L5-S1 TLIFs, T12-L1, L1-2, L3-4 PCOs.   MRI as above   TLSO brace for OOB   anemia, s/p transfusion, stable    bowel regimen   Pain - Tylenol, oxycodone, tramadol, gabapentin, robaxin, seen by pain management   DVT PPX - SCDs lovenox   Rehab -    patient requires min assist with mobility    continue bedside therapy while admitted to prevent secondary complications of immobility, bed mobility, transfer training, progressive ambulation, equipment evaluation, ADLs   OOB throughout the day with staff, OOB to chair with meals/3 hours daily        Recommend ACUTE inpatient rehabilitation for the functional deficits consisting of 3 hours of multidisciplinary intense therapy/day x 5 days/week x 1-2 weeks depending on progress at rehabilitation facility, 24 hour RN/daily PMR physician for comorbid medical management.      Patient will be able to participate in and benefit from intense rehabilitation therapies for 3 hours a day x 5 days/week to maximize independence.     Rehab recommendations are dependent on functional progress and participation with bedside therapy     Will continue to follow for ongoing rehab needs and recommendations.           35 minutes spent on total encounter  with chart review of PT/OT/SLP notes, exam, imaging, counseling and education on inpatient rehabilitation, coordination of care with rehab team and , review of screen

## 2024-12-02 NOTE — PATIENT PROFILE ADULT - FALL HARM RISK - UNIVERSAL INTERVENTIONS
Bed in lowest position, wheels locked, appropriate side rails in place/Call bell, personal items and telephone in reach/Instruct patient to call for assistance before getting out of bed or chair/Non-slip footwear when patient is out of bed/[place] to call system/Physically safe environment - no spills, clutter or unnecessary equipment/Purposeful Proactive Rounding/Room/bathroom lighting operational, light cord in reach

## 2024-12-02 NOTE — DISCHARGE NOTE NURSING/CASE MANAGEMENT/SOCIAL WORK - FINANCIAL ASSISTANCE
[facility] provides services at a reduced cost to those who are determined to be eligible through [facility]’s financial assistance program. Information regarding [facility]’s financial assistance program can be found by going to [email or web address] or by calling [phone].

## 2024-12-02 NOTE — PROGRESS NOTE ADULT - PROBLEM SELECTOR PLAN 2
Has been sinus/paced rhythm. Can d/c tele.     On Metoprolol 50mg bid.     H/o sick sinus syndrome/ heart block,  atrial flutter s/p cardiac ablation x1 ( 2016), PPM dependent dual chamber status since 2013 ( revised / replaced batter in 2023/last interrogation in 06/2024)    Xarelto- per patient surgeon advised to stop for 10 days last dose 11/10/24- resume when cleared.
Has been sinus/paced rhythm. Tele d/jamel.     On Metoprolol 50mg bid.     H/o sick sinus syndrome/ heart block,  atrial flutter s/p cardiac ablation x1 ( 2016), PPM dependent dual chamber status since 2013 ( revised / replaced batter in 2023/last interrogation in 06/2024)    Xarelto- per patient surgeon advised to stop for 10 days last dose 11/10/24- resume when cleared.
Has been sinus/paced rhythm. Tele d/jamel. On Metoprolol 50mg bid.     H/o sick sinus syndrome/ heart block,  atrial flutter s/p cardiac ablation x1 ( 2016), PPM dependent dual chamber status since 2013 ( revised / replaced batter in 2023/last interrogation in 06/2024)    PPM [employer], interrogated on 11/25 for MRI - shows measured data WNL, normal pacemaker function, NOT dependent.    Xarelto- per patient surgeon advised to stop for 10 days last dose 11/10/24- resume when cleared.
cont Metoprolol. AC once safe from neurosurg standpoint

## 2024-12-02 NOTE — PATIENT PROFILE ADULT - LIVING ENVIRONMENT
Patient is a 88y old  Female who presents with a chief complaint of syncope (11 Apr 2024 11:01)    Patient was seen and examined.  Has diarrhea again  C/o dizziness  Denies any other new  complaints.    PAST MEDICAL & SURGICAL HISTORY:  Hypertension  Gout  Diverticulitis sigmoid  Rheumatoid arthritis  DVT, lower extremity Bilateral  Pulmonary embolism  Chronic HFrEF (heart failure with reduced ejection fraction)  AICD (automatic cardioverter/defibrillator) present  ESRD on dialysis  History of appendectomy  History of tonsillectomy  History of hysterectomy  H/O hernia repair    Allergies  cephalosporins (Angioedema)  Keflex (Swelling)    MEDICATIONS  (STANDING):  apixaban 2.5 milliGRAM(s) Oral every 12 hours  atorvastatin 80 milliGRAM(s) Oral at bedtime  calcitriol   Capsule 0.5 MICROGram(s) Oral every 12 hours  chlorhexidine 2% Cloths 1 Application(s) Topical <User Schedule>  darbepoetin Injectable Syringe 25 MICROGram(s) IV Push <User Schedule>  ferrous    sulfate 325 milliGRAM(s) Oral <User Schedule>  folic acid 1 milliGRAM(s) Oral daily  guaiFENesin  milliGRAM(s) Oral every 12 hours  metoprolol succinate ER 50 milliGRAM(s) Oral daily  sertraline 25 milliGRAM(s) Oral daily    MEDICATIONS  (PRN):  acetaminophen     Tablet .. 650 milliGRAM(s) Oral every 6 hours PRN Temp greater or equal to 38C (100.4F)  albuterol    90 MICROgram(s) HFA Inhaler 2 Puff(s) Inhalation every 6 hours PRN for shortness of breath and/or wheezing    T(C): 36.5 (04-14-24 @ 07:00), Max: 36.7 (04-14-24 @ 06:16)  HR: 64 (04-14-24 @ 07:00) (64 - 83)  BP: 128/64 (04-14-24 @ 07:00) (124/69 - 158/75)  RR: 18 (04-14-24 @ 07:00) (18 - 19)  SpO2: --    O/E:  Awake, alert, not in distress.  HEENT: atraumatic, EOMI.  Chest: clear.  CVS: SIS2 +, no murmur.  P/A: Soft, BS+  CNS: awake, alert  Ext: no edema feet.  Skin: no rash, no ulcers.  All systems reviewed positive findings as above.                                9.5<L>  7.92  )-----------( 271      ( 14 Apr 2024 06:27 )             31.0<L>                        9.8<L>  7.74  )-----------( 345      ( 13 Apr 2024 07:41 )             32.6<L>  04-14    137  |  100  |  13  ----------------------------<  99  4.1   |  25  |  3.3<H>  04-13    133<L>  |  93<L>  |  21<H>  ----------------------------<  72  4.0   |  23  |  4.4<HH>    Ca    8.6      14 Apr 2024 06:27  Ca    8.6      13 Apr 2024 07:41  Mg     1.9     04-14     no

## 2024-12-02 NOTE — PROGRESS NOTE ADULT - PROBLEM SELECTOR PLAN 1
S/p surgery as above. Continue postop pain control, PT    11/25 MR T/L spine shows redemonstration of posterior fusion of T11-S1, bilateral sacroiliac fusion and multilevel facetectomies, no gross evidence for abnormal intrinsic cord signal. No spinal cord compression, no significant spinal canal stenosis in the thoracic region. Expected post-op edema throughout the posterior paraspinal soft tissue at the levels of the surgery.
S/p surgery as above. Continue postop pain control, PT    On IV Ancef while drains in place.
S/p surgery as above. Continue postop pain control, PT    On IV Ancef while drains in place.
s/p T11-pelvis instrumentation and fusion; L4-L5, L5-S1 TLIFs, T12-L1, L1-L2, L2-L3, L3-L4 posterior column osteotomies for coronal scoliosis deformity correction 11/20/2024. PT and pain control. Neurosurg f/u for LE weakness and paresthesias

## 2024-12-02 NOTE — H&P ADULT - NSHPREVIEWOFSYSTEMS_GEN_ALL_CORE
REVIEW OF SYSTEMS  Constitutional: No fever, No Chills, No fatigue  HEENT: No eye pain, No visual disturbances, No difficulty hearing  Pulm: No cough,  No shortness of breath  Cardio: No chest pain, No palpitations  GI:  No abdominal pain, No nausea, No vomiting, No diarrhea, No constipation, LBM 12/2  : No dysuria, No frequency, No hematuria  Neuro: No headaches, No memory loss, +BLE weakness, + numbness/tingling BL feet, No tremors, no light headed/dizziness   Skin: No itching, No rashes, No lesions   Endo: No temperature intolerance  MSK: +R thigh pain, + R ankle pain, No Neck pain,  No back pain, + BL calf pain   Psych:  No depression, No anxiety Constitutional: No fever, No Chills, No fatigue  HEENT: No eye pain, No visual disturbances, No difficulty hearing  Pulm: No cough,  No shortness of breath  Cardio: No chest pain, No palpitations  GI:  No abdominal pain, No nausea, No vomiting, No diarrhea, No constipation, LBM 12/2  : No dysuria, No frequency, No hematuria  Neuro: No headaches, No memory loss, +BLE weakness, + numbness/tingling BL feet, No tremors, no light headed/dizziness   Skin: No itching, No rashes, No lesions   Endo: No temperature intolerance  MSK: +R thigh pain, + R ankle pain, No Neck pain,  No back pain, + BL calf pain   Psych:  No depression, No anxiety

## 2024-12-02 NOTE — PROGRESS NOTE ADULT - PROBLEM SELECTOR PLAN 3
On home Triamterene/HCTZ. Continue.
can cont triamterene/hctz but monitor for hyponatremia
On home Triamterene/HCTZ. Continue.
On home Triamterene/HCTZ. Continue.

## 2024-12-02 NOTE — H&P ADULT - REASON FOR ADMISSION
Scoliosis Severe kyphoscoliosis of the thoracolumbar spine with trunk shift and severe stenosis at L4-5 s/p surgery

## 2024-12-02 NOTE — PROGRESS NOTE ADULT - PROVIDER SPECIALTY LIST ADULT
NSICU
NSICU
Neurosurgery
Neurosurgery
Hospitalist
NSICU
Neurosurgery
Rehab Medicine
Rehab Medicine
NSICU
NSICU
Neurosurgery
Neurosurgery
NSICU
Neurosurgery
Hospitalist

## 2024-12-02 NOTE — PROGRESS NOTE ADULT - THIS PATIENT HAS THE FOLLOWING CONDITION(S)/DIAGNOSES ON THIS ADMISSION:
Functional Quadriplegia
post-op anemia, stable/Acute Blood Loss Anemia
Functional Quadriplegia
None
Functional Quadriplegia
Functional Quadriplegia
None
Functional Quadriplegia
Functional Quadriplegia
None

## 2024-12-02 NOTE — DISCHARGE NOTE NURSING/CASE MANAGEMENT/SOCIAL WORK - PATIENT PORTAL LINK FT
You can access the FollowMyHealth Patient Portal offered by [facility] by registering at the following website: [email or web address]. By joining [email or web address] [email or web address] portal, you will also be able to view your health information using other applications (apps) compatible with our system.

## 2024-12-02 NOTE — H&P ADULT - ASSESSMENT
Assessment/Plan:  ESEQUIEL SHAW is a 80 y/o F w/ HTN, HLD, Afib atrial flutter, SSS/HB,  A flutter s/p cardiac ablation x1 ( 2016), PPM  dependent dual chamber status (2013), breast augmentation, and chronic lower back pain presents to [facility] on 11/20 w/ back pain,  BLE weakness, and difficulty ambulating.  S/p s/p T11-pelvis instrumentation and fusion; L4-L5, L5-S1 TLIFs, T12-L1, L1-L2, L2-L3, L3-L4 posterior column osteotomies for coronal scoliosis deformity correction with Dr. Allen on 11/20.   Now admitted to [facility] for functional deficits for initiation of a multidisciplinary rehab program consisting focused on functional mobility, transfers and ADLs.    #Scoliosis   #Post op persistent BLE weakness/paresthesias  -s/p T11-pelvis instrumentation and fusion; L4-L5, L5-S1 TLIFs, T12-L1, L1-L2, L2-L3, L3-L4 posterior column osteotomies for coronal scoliosis deformity correction 11/20/2024 w/ Dr. Cox  -11/25 MR T/L spine shows redemonstration of posterior fusion of T11-S1, bilateral sacroiliac fusion and multilevel facetectomies, no gross evidence for abnormal intrinsic cord signal. No spinal cord compression, no significant spinal canal stenosis in the thoracic region. Expected post-op edema throughout the posterior paraspinal soft tissue at the levels of the surgery  -Pain management: Tylenol PRN, Robaxin 500mg TID PRN, Tramadol 25mg q6h PRN, Gabapentin 400?, 600mg HS  -OOB w/ TLSO    Deficits: BLE weakness/paresthesias   Comprehensive Multidisciplinary Rehab Program:  - Start comprehensive rehab program, 3 hours a day, 5 days a week.  - PT 2hr/day: Focused on improving strength, endurance, coordination, balance, functional mobility, and transfers  - OT 1hr/day: Focused on improving strength, fine motor skills, coordination, posture and ADLs.    - Activity Limitations: Decreased social, vocational and leisure activities, decreased self care and ADLs, decreased mobility, decreased ability to manage household and finances.     -----------------------------------------------------------------------------------  Concurrent Medical Problems    #Airway edema s/p extubation  -S/p dexamethasone treatment     #PAF  #PPM  -PPM [employer], interrogated on 11/25 for MRI - shows measured data WNL, normal pacemaker function, NOT dependent.  - 11/22 TTE: LV systolic fxn normal w/ EF 64%, normal RV systolic fxn, mild to modr TR  -Metoprolol 50mg BID  -Xarelto    #Post op anemia (11/22)  -S/p 5U rRBC  -S/p 3U FFP   -Monitor H/H  -Transfuse Hgb <7 PRN    #RUE ecchymosis  #Radial artery occulusion RUE   -11/22 LED negative  - 11/23 RUE Art Duplex: Right radial artery demonstrates diminished flow velocity at the mid forearm and is occluded at distal forearm. There is retrograde right radial artery flow at the wrist. Surrounding soft tissue edema is noted.   Antegrade flow is noted of the right ulnar artery. Patient's right arm is bruised however is warm, skin is pink, 1+pulse with good capillary refills, discussed results with patient.   -11/23 and 11/25 RUE/LUE Dopp: negative for upper ext DVT.   -Monitor for s/s of ischemia     #HTN/HLD  -Usggqbddsfd33.5mg/XXXI55vz daily  -Rosuvastatin 10mg HS    #Mood/Cognition:  Neuropsychology consult PRN    #Sleep:   Maintain quiet hours and low stim environment.  Melatonin 6mg HS PRN to maximize participation in therapy during the day.     #GI/Bowel:  Senna QHS, Miralax PRN Daily, Bisacodyl suppository 10mg daily  GI ppx: Pantoprazole 40mg daily     #/Bladder:   - PVR x1 on admission (SC if > 400)    #Skin/Pressure Injury:   - Skin assessment on admission: ***    #Diet  Current Diet: Regular diet  Nutrition consult     #Precautions / PROPHYLAXIS:   - Falls, Spinal  - ortho: Weight bearing status: WBAT, OOB w/ TLSO brace  - Lungs: Aspiration, Incentive Spirometer   - DVT ppx: Lovenox 40mg daily, SCDs  - Pressure injury/Skin: OOB to Chair, PT/OT      ---------------  Code Status: FULL  Emergency Contact:    Outpatient Follow-up (Specialty/Name of physician):    Jony Allen  Neurosurgery  [address], [address]  [place] [address]  Phone: [phone]  Fax: [phone]  Follow Up Time:     DREW AMAYA  [address], [address]  [place] [address]      --------------  Goals: Safe discharge to Home*****  Estimated Length of Stay: 10-14 days  Rehab Potential: Good  Medical Prognosis: Good  Estimated Disposition: Home with Home Care  ---------------    PRESCREEN COMPARISON:  I have reviewed the prescreen information and I have found no relevant changes between the preadmission screening and my post admission evaluation.    RATIONALE FOR INPATIENT ADMISSION: Patient demonstrates the following:  [X] Medically appropriate for rehabilitation admission  [X] Has attainable rehab goals with an appropriate initial discharge plan  [X]Has rehabilitation potential (expected to make a significant improvement within a reasonable period of time)  [X] Requires close medical management by a rehab physician, rehab nursing care, Hospitalist and comprehensive interdisciplinary team (including PT, OT and/or SLP, Prosthetics and Orthotics)       Assessment/Plan:  ESEQUIEL SHAW is a 80 y/o F w/ HTN, HLD, Afib atrial flutter, SSS/HB,  A flutter s/p cardiac ablation x1 ( 2016), PPM  dependent dual chamber status (2013), breast augmentation, and chronic lower back pain presents to [facility] on 11/20 w/ back pain,  BLE weakness, and difficulty ambulating.  S/p s/p T11-pelvis instrumentation and fusion; L4-L5, L5-S1 TLIFs, T12-L1, L1-L2, L2-L3, L3-L4 posterior column osteotomies for coronal scoliosis deformity correction with Dr. Allen on 11/20.   Now admitted to [facility] for functional deficits for initiation of a multidisciplinary rehab program consisting focused on functional mobility, transfers and ADLs.    #Scoliosis   #Post op persistent BLE weakness/paresthesias  -s/p T11-pelvis instrumentation and fusion; L4-L5, L5-S1 TLIFs, T12-L1, L1-L2, L2-L3, L3-L4 posterior column osteotomies for coronal scoliosis deformity correction 11/20/2024 w/ Dr. Cox  -11/25 MR T/L spine shows redemonstration of posterior fusion of T11-S1, bilateral sacroiliac fusion and multilevel facetectomies, no gross evidence for abnormal intrinsic cord signal. No spinal cord compression, no significant spinal canal stenosis in the thoracic region. Expected post-op edema throughout the posterior paraspinal soft tissue at the levels of the surgery  -Pain management: Tylenol PRN, Robaxin 500mg TID PRN, Tramadol 25mg q6h PRN, Gabapentin 400QD/ 600mg HS  -OOB w/ TLSO    Deficits: BLE weakness/paresthesias   Comprehensive Multidisciplinary Rehab Program:  - Start comprehensive rehab program, 3 hours a day, 5 days a week.  - PT 2hr/day: Focused on improving strength, endurance, coordination, balance, functional mobility, and transfers  - OT 1hr/day: Focused on improving strength, fine motor skills, coordination, posture and ADLs.    - Activity Limitations: Decreased social, vocational and leisure activities, decreased self care and ADLs, decreased mobility, decreased ability to manage household and finances.     -----------------------------------------------------------------------------------  Concurrent Medical Problems    #Airway edema s/p extubation  -S/p dexamethasone treatment     #PAF  #PPM  -PPM [employer], interrogated on 11/25 for MRI - shows measured data WNL, normal pacemaker function, NOT dependent.  - 11/22 TTE: LV systolic fxn normal w/ EF 64%, normal RV systolic fxn, mild to modr TR  -Metoprolol 50mg BID  -Xarelto    #Post op anemia (11/22)   -S/p 5U rRBC- lowest hgb 6.3 - 11/22  -S/p 3U FFP   -Monitor H/H (11.2/34.2 - 12/2)  -Transfuse Hgb <7 PRN    #RUE ecchymosis  #Radial artery occulusion RUE   -11/22 LED negative  - 11/23 RUE Art Duplex: Right radial artery demonstrates diminished flow velocity at the mid forearm and is occluded at distal forearm. There is retrograde right radial artery flow at the wrist. Surrounding soft tissue edema is noted.   Antegrade flow is noted of the right ulnar artery. Patient's right arm is bruised however is warm, skin is pink, 1+pulse with good capillary refills, discussed results with patient.   -11/23 and 11/25 RUE/LUE Dopp: negative for upper ext DVT.   -Monitor for s/s of ischemia     #HTN/HLD  -Pydtanhiklc43.5mg/XRPS61gr daily  -Rosuvastatin 10mg HS    #Mood/Cognition:  Neuropsychology consult PRN    #Sleep:   Maintain quiet hours and low stim environment.  Melatonin 6mg HS PRN to maximize participation in therapy during the day.     #GI/Bowel:  Senna QHS, Miralax PRN Daily, Bisacodyl suppository 10mg daily  GI ppx: Pantoprazole 40mg daily     #/Bladder:   - PVR x1 on admission (SC if > 400)    #Skin/Pressure Injury:   - Skin assessment on admission: ***    #Diet  Current Diet: Regular diet  Nutrition consult     #Precautions / PROPHYLAXIS:   - Falls, Spinal  - ortho: Weight bearing status: WBAT, OOB w/ TLSO brace  - Lungs: Aspiration, Incentive Spirometer   - DVT ppx: Lovenox 40mg daily, SCDs  - Pressure injury/Skin: OOB to Chair, PT/OT      ---------------  Code Status: FULL  Emergency Contact:    Outpatient Follow-up (Specialty/Name of physician):    Jony Allen  Neurosurgery  [address], [address]  [place] [address]  Phone: [phone]  Fax: [phone]  Follow Up Time:     DREW AMAYA  [address], [address]  [place] [address]      --------------  Goals: Safe discharge to Home*****  Estimated Length of Stay: 10-14 days  Rehab Potential: Good  Medical Prognosis: Good  Estimated Disposition: Home with Home Care  ---------------    PRESCREEN COMPARISON:  I have reviewed the prescreen information and I have found no relevant changes between the preadmission screening and my post admission evaluation.    RATIONALE FOR INPATIENT ADMISSION: Patient demonstrates the following:  [X] Medically appropriate for rehabilitation admission  [X] Has attainable rehab goals with an appropriate initial discharge plan  [X]Has rehabilitation potential (expected to make a significant improvement within a reasonable period of time)  [X] Requires close medical management by a rehab physician, rehab nursing care, Hospitalist and comprehensive interdisciplinary team (including PT, OT and/or SLP, Prosthetics and Orthotics)       Assessment/Plan:  ESEQUIEL SHAW is a 78 y/o F w/ HTN, HLD, Afib atrial flutter, SSS/HB,  A flutter s/p cardiac ablation x1 ( 2016), PPM  dependent dual chamber status (2013), breast augmentation, and chronic lower back pain presents to [facility] on 11/20 w/ back pain,  BLE weakness, and difficulty ambulating.  S/p s/p T11-pelvis instrumentation and fusion; L4-L5, L5-S1 TLIFs, T12-L1, L1-L2, L2-L3, L3-L4 posterior column osteotomies for coronal scoliosis deformity correction with Dr. Allen on 11/20.   Now admitted to [facility] for functional deficits for initiation of a multidisciplinary rehab program consisting focused on functional mobility, transfers and ADLs.    #Scoliosis   #Post op persistent BLE weakness/paresthesias  -s/p T11-pelvis instrumentation and fusion; L4-L5, L5-S1 TLIFs, T12-L1, L1-L2, L2-L3, L3-L4 posterior column osteotomies for coronal scoliosis deformity correction 11/20/2024 w/ Dr. Cox  -11/25 MR T/L spine shows redemonstration of posterior fusion of T11-S1, bilateral sacroiliac fusion and multilevel facetectomies, no gross evidence for abnormal intrinsic cord signal. No spinal cord compression, no significant spinal canal stenosis in the thoracic region. Expected post-op edema throughout the posterior paraspinal soft tissue at the levels of the surgery  -Pain management: Tylenol PRN, Robaxin 500mg TID PRN, Tramadol 25mg q6h PRN, Gabapentin 400QD/ 600mg HS  -OOB w/ TLSO    Deficits: BLE weakness/paresthesias   Comprehensive Multidisciplinary Rehab Program:  - Start comprehensive rehab program, 3 hours a day, 5 days a week.  - PT 2hr/day: Focused on improving strength, endurance, coordination, balance, functional mobility, and transfers  - OT 1hr/day: Focused on improving strength, fine motor skills, coordination, posture and ADLs.    - Activity Limitations: Decreased social, vocational and leisure activities, decreased self care and ADLs, decreased mobility, decreased ability to manage household and finances.     -----------------------------------------------------------------------------------  Concurrent Medical Problems    #Airway edema s/p extubation  -S/p dexamethasone treatment     #PAF  #PPM  -PPM [employer], interrogated on 11/25 for MRI - shows measured data WNL, normal pacemaker function, NOT dependent.  - 11/22 TTE: LV systolic fxn normal w/ EF 64%, normal RV systolic fxn, mild to modr TR  -Metoprolol 50mg BID  -Xarelto to resume on 12/9     #Post op anemia (11/22)   -S/p 5U rRBC- lowest hgb 6.3 - 11/22  -S/p 3U FFP   -Monitor H/H (11.2/34.2 - 12/2)  -Transfuse Hgb <7 PRN    #RUE ecchymosis  #Radial artery occulusion RUE   -11/22 LED negative  - 11/23 RUE Art Duplex: Right radial artery demonstrates diminished flow velocity at the mid forearm and is occluded at distal forearm. There is retrograde right radial artery flow at the wrist. Surrounding soft tissue edema is noted.   Antegrade flow is noted of the right ulnar artery. Patient's right arm is bruised however is warm, skin is pink, 1+pulse with good capillary refills, discussed results with patient.   -11/23 and 11/25 RUE/LUE Dopp: negative for upper ext DVT.   -Monitor for s/s of ischemia     #HTN/HLD  -Ndquyiamqkl83.5mg/GNAW71ch daily  -Rosuvastatin 10mg HS    #Mood/Cognition:  Neuropsychology consult PRN    #Sleep:   Maintain quiet hours and low stim environment.  Melatonin 6mg HS PRN to maximize participation in therapy during the day.     #GI/Bowel:  Senna QHS, Miralax PRN Daily, Bisacodyl suppository 10mg daily  GI ppx: Pantoprazole 40mg daily     #/Bladder:   - PVR x1 on admission (SC if > 400)    #Skin/Pressure Injury:   - Skin assessment on admission: ST L groin 4.5x1.5cm, 2.5x0.5cm ST mid upper back, 26cm mid thoracic/lumbar incision, scabbed, CDI w/ 3 drain sites, L gluteal blanchable redness with localized pustules, BLUE ecchymosis (R>L)    #Diet  Current Diet: Regular diet  Nutrition consult     #Precautions / PROPHYLAXIS:   - Falls, Spinal  - ortho: Weight bearing status: WBAT, OOB w/ TLSO brace  - Lungs: Aspiration, Incentive Spirometer   - DVT ppx: Lovenox 40mg daily, SCDs  - Pressure injury/Skin: OOB to Chair, PT/OT      ---------------  Code Status: FULL  Emergency Contact:    Outpatient Follow-up (Specialty/Name of physician):    Jony Allen  Neurosurgery  [address]  [place] [address]  Phone: [phone]  Fax: [phone]  Follow Up Time:     DREW AMAYA  [address], [address]  [place] [address]      --------------  Goals: Safe discharge to Home*****  Estimated Length of Stay: 10-14 days  Rehab Potential: Good  Medical Prognosis: Good  Estimated Disposition: Home with Home Care  ---------------    PRESCREEN COMPARISON:  I have reviewed the prescreen information and I have found no relevant changes between the preadmission screening and my post admission evaluation.    RATIONALE FOR INPATIENT ADMISSION: Patient demonstrates the following:  [X] Medically appropriate for rehabilitation admission  [X] Has attainable rehab goals with an appropriate initial discharge plan  [X]Has rehabilitation potential (expected to make a significant improvement within a reasonable period of time)  [X] Requires close medical management by a rehab physician, rehab nursing care, Hospitalist and comprehensive interdisciplinary team (including PT, OT and/or SLP, Prosthetics and Orthotics)       Assessment/Plan:  ESEQUIEL SHAW is a 80 y/o F w/ HTN, HLD, Afib atrial flutter, SSS/HB,  A flutter s/p cardiac ablation x1 ( 2016), PPM  dependent dual chamber status (2013), breast augmentation, and chronic lower back pain presents to [facility] on 11/20 w/ back pain,  BLE weakness, and difficulty ambulating.  S/p s/p T11-pelvis instrumentation and fusion; L4-L5, L5-S1 TLIFs, T12-L1, L1-L2, L2-L3, L3-L4 posterior column osteotomies for coronal scoliosis deformity correction with Dr. Allen on 11/20.   Now admitted to [facility] for functional deficits for initiation of a multidisciplinary rehab program consisting focused on functional mobility, transfers and ADLs.    #Scoliosis   #Post op persistent BLE weakness/paresthesias  -s/p T11-pelvis instrumentation and fusion; L4-L5, L5-S1 TLIFs, T12-L1, L1-L2, L2-L3, L3-L4 posterior column osteotomies for coronal scoliosis deformity correction 11/20/2024 w/ Dr. Cox  -11/25 MR T/L spine shows redemonstration of posterior fusion of T11-S1, bilateral sacroiliac fusion and multilevel facetectomies, no gross evidence for abnormal intrinsic cord signal. No spinal cord compression, no significant spinal canal stenosis in the thoracic region. Expected post-op edema throughout the posterior paraspinal soft tissue at the levels of the surgery  -Pain management: Tylenol PRN, Robaxin 500mg TID PRN, Tramadol 25mg q6h PRN, Gabapentin 400QD/ 600mg HS  -OOB w/ TLSO    Deficits: BLE weakness/paresthesias   Comprehensive Multidisciplinary Rehab Program:  - Start comprehensive rehab program, 3 hours a day, 5 days a week.  - PT 2hr/day: Focused on improving strength, endurance, coordination, balance, functional mobility, and transfers  - OT 1hr/day: Focused on improving strength, fine motor skills, coordination, posture and ADLs.    - Activity Limitations: Decreased social, vocational and leisure activities, decreased self care and ADLs, decreased mobility, decreased ability to manage household and finances.     -----------------------------------------------------------------------------------  Concurrent Medical Problems    #Airway edema s/p extubation  -S/p dexamethasone treatment     #PAF  #PPM  -PPM [employer], interrogated on 11/25 for MRI - shows measured data WNL, normal pacemaker function, NOT dependent.  - 11/22 TTE: LV systolic fxn normal w/ EF 64%, normal RV systolic fxn, mild to modr TR  -Metoprolol 50mg BID  -Xarelto 20mg daily to resume on 12/9 --> Last dose Lovenox on 12/8    #Post op anemia (11/22)   -S/p 5U rRBC- lowest hgb 6.3 - 11/22  -S/p 3U FFP   -Monitor H/H (11.2/34.2 - 12/2)  -Transfuse Hgb <7 PRN    #RUE ecchymosis  #Radial artery occulusion RUE   -11/22 LED negative  - 11/23 RUE Art Duplex: Right radial artery demonstrates diminished flow velocity at the mid forearm and is occluded at distal forearm. There is retrograde right radial artery flow at the wrist. Surrounding soft tissue edema is noted.   Antegrade flow is noted of the right ulnar artery. Patient's right arm is bruised however is warm, skin is pink, 1+pulse with good capillary refills, discussed results with patient.   -11/23 and 11/25 RUE/LUE Dopp: negative for upper ext DVT.   -Monitor for s/s of ischemia     #HTN/HLD  -Gojaojccdya18.5mg/FALL76kh daily  -Rosuvastatin 10mg HS    #Mood/Cognition:  Neuropsychology consult PRN    #Sleep:   Maintain quiet hours and low stim environment.  Melatonin 6mg HS PRN to maximize participation in therapy during the day.     #GI/Bowel:  Senna QHS, Miralax PRN Daily, Bisacodyl suppository 10mg daily  GI ppx: Pantoprazole 40mg daily     #/Bladder:   - PVR x1 on admission (SC if > 400)    #Skin/Pressure Injury:   - Skin assessment on admission: ST L groin 4.5x1.5cm, 2.5x0.5cm ST mid upper back, 26cm mid thoracic/lumbar incision, scabbed, CDI w/ 3 drain sites, L gluteal blanchable redness with localized pustules, BLUE ecchymosis (R>L)    #Diet  Current Diet: Regular diet  Nutrition consult     #Precautions / PROPHYLAXIS:   - Falls, Spinal  - ortho: Weight bearing status: WBAT, OOB w/ TLSO brace  - Lungs: Aspiration, Incentive Spirometer   - DVT ppx: Lovenox 40mg daily until 12/8, Xarelto 20mg daily starting 12/9, SCDs  - Pressure injury/Skin: OOB to Chair, PT/OT      ---------------  Code Status: FULL  Emergency Contact:    Outpatient Follow-up (Specialty/Name of physician):    Jony Allen  Neurosurgery  [address]  [place] [address]  Phone: [phone]  Fax: [phone]  Follow Up Time:     DREW AMAYA  [address]  [place] [address]      --------------  Goals: Safe discharge to Home*****  Estimated Length of Stay: 10-14 days  Rehab Potential: Good  Medical Prognosis: Good  Estimated Disposition: Home with Home Care  ---------------    PRESCREEN COMPARISON:  I have reviewed the prescreen information and I have found no relevant changes between the preadmission screening and my post admission evaluation.    RATIONALE FOR INPATIENT ADMISSION: Patient demonstrates the following:  [X] Medically appropriate for rehabilitation admission  [X] Has attainable rehab goals with an appropriate initial discharge plan  [X]Has rehabilitation potential (expected to make a significant improvement within a reasonable period of time)  [X] Requires close medical management by a rehab physician, rehab nursing care, Hospitalist and comprehensive interdisciplinary team (including PT, OT and/or SLP, Prosthetics and Orthotics)       Assessment/Plan:  ESEQUIEL SHAW is a 80 y/o F w/ HTN, HLD, Afib atrial flutter, SSS/HB,  A flutter s/p cardiac ablation x1 ( 2016), PPM  dependent dual chamber status (2013), breast augmentation, and chronic lower back pain presents to [facility] on 11/20 w/ back pain,  BLE weakness, and difficulty ambulating.  S/p s/p T11-pelvis instrumentation and fusion; L4-L5, L5-S1 TLIFs, T12-L1, L1-L2, L2-L3, L3-L4 posterior column osteotomies for coronal scoliosis deformity correction with Dr. Allen on 11/20.   Now admitted to [facility] for functional deficits for initiation of a multidisciplinary rehab program consisting focused on functional mobility, transfers and ADLs.    #Scoliosis   #Post op persistent BLE weakness/paresthesias  -s/p T11-pelvis instrumentation and fusion; L4-L5, L5-S1 TLIFs, T12-L1, L1-L2, L2-L3, L3-L4 posterior column osteotomies for coronal scoliosis deformity correction 11/20/2024 w/ Dr. Cox  -11/25 MR T/L spine shows redemonstration of posterior fusion of T11-S1, bilateral sacroiliac fusion and multilevel facetectomies, no gross evidence for abnormal intrinsic cord signal. No spinal cord compression, no significant spinal canal stenosis in the thoracic region. Expected post-op edema throughout the posterior paraspinal soft tissue at the levels of the surgery  -Pain management: Tylenol PRN, Robaxin 500mg TID PRN, Tramadol 25mg q6h PRN, Gabapentin 400QD/ 600mg HS  -OOB w/ TLSO    Deficits: BLE weakness/paresthesias   Comprehensive Multidisciplinary Rehab Program:  - Start comprehensive rehab program, 3 hours a day, 5 days a week.  - PT 2hr/day: Focused on improving strength, endurance, coordination, balance, functional mobility, and transfers  - OT 1hr/day: Focused on improving strength, fine motor skills, coordination, posture and ADLs.    - Activity Limitations: Decreased social, vocational and leisure activities, decreased self care and ADLs, decreased mobility, decreased ability to manage household and finances.     -----------------------------------------------------------------------------------  Concurrent Medical Problems    #Airway edema s/p extubation  -S/p dexamethasone treatment     #PAF  #PPM  -PPM [employer], interrogated on 11/25 for MRI - shows measured data WNL, normal pacemaker function, NOT dependent.  - 11/22 TTE: LV systolic fxn normal w/ EF 64%, normal RV systolic fxn, mild to modr TR  -Metoprolol 50mg BID  -Xarelto 20mg daily to resume on 12/9 --> Last dose Lovenox on 12/8    #Post op anemia (11/22)   -S/p 5U rRBC- lowest hgb 6.3 - 11/22  -S/p 3U FFP   -Monitor H/H (11.2/34.2 - 12/2)  -Transfuse Hgb <7 PRN    #RUE ecchymosis  #Radial artery occulusion RUE   -11/22 LED negative  - 11/23 RUE Art Duplex: Right radial artery demonstrates diminished flow velocity at the mid forearm and is occluded at distal forearm. There is retrograde right radial artery flow at the wrist. Surrounding soft tissue edema is noted.   Antegrade flow is noted of the right ulnar artery. Patient's right arm is bruised however is warm, skin is pink, 1+pulse with good capillary refills, discussed results with patient.   -11/23 and 11/25 RUE/LUE Dopp: negative for upper ext DVT.   -Monitor for s/s of ischemia     #HTN/HLD  -Cdpqyaetxvc93.5mg/LOSC12lb daily  -Rosuvastatin 10mg HS    #Mood/Cognition:  Neuropsychology consult PRN    #Sleep:   Maintain quiet hours and low stim environment.  Melatonin 6mg HS PRN to maximize participation in therapy during the day.     #GI/Bowel:  Senna QHS, Miralax PRN Daily, Bisacodyl suppository 10mg daily  GI ppx: Pantoprazole 40mg daily     #/Bladder:   - PVR x1 on admission (SC if > 400)    #Skin/Pressure Injury:   - Skin assessment on admission: ST L groin 4.5x1.5cm, 2.5x0.5cm ST mid upper back, 26cm mid thoracic/lumbar incision, scabbed, CDI w/ 3 drain sites, L gluteal blanchable redness with localized pustules, BLUE ecchymosis (R>L)    #Diet  Current Diet: Regular diet  Nutrition consult     #Precautions / PROPHYLAXIS:   - Falls, Spinal  - ortho: Weight bearing status: WBAT, OOB w/ TLSO brace  - Lungs: Aspiration, Incentive Spirometer   - DVT ppx: Lovenox 40mg daily until 12/8, Xarelto 20mg daily starting 12/9, SCDs  - BLE Duplex US r/o DVT on admission ---> b/l calf pain  - Pressure injury/Skin: OOB to Chair, PT/OT      ---------------  Code Status: FULL  Emergency Contact:    Outpatient Follow-up (Specialty/Name of physician):    Jony Allen  Neurosurgery  [address], [address]  [place] [address]  Phone: [phone]  Fax: [phone]  Follow Up Time:     DREW AMAYA  [address], [address]  [place] [address]      --------------  Goals: Safe discharge to Home*****  Estimated Length of Stay: 10-14 days  Rehab Potential: Good  Medical Prognosis: Good  Estimated Disposition: Home with Home Care  ---------------    PRESCREEN COMPARISON:  I have reviewed the prescreen information and I have found no relevant changes between the preadmission screening and my post admission evaluation.    RATIONALE FOR INPATIENT ADMISSION: Patient demonstrates the following:  [X] Medically appropriate for rehabilitation admission  [X] Has attainable rehab goals with an appropriate initial discharge plan  [X]Has rehabilitation potential (expected to make a significant improvement within a reasonable period of time)  [X] Requires close medical management by a rehab physician, rehab nursing care, Hospitalist and comprehensive interdisciplinary team (including PT, OT and/or SLP, Prosthetics and Orthotics)       Assessment/Plan:  Mrs. Ct Levine 78 y/o F w/ HTN, HLD, Afib atrial flutter, sick sinus syndrome/heart block, atrial flutter s/p cardiac ablation x1 ( 2016), PPM dependent dual chamber status (2013), breast augmentation, and chronic lower back pain who presented to [facility] on 11/20 w/c/o non radiating lower back pain, BLE weakness (R>L) and difficulty ambulating. Surgical management was recommended and she is s/p T11-pelvis instrumentation and fusion; L4-L5, L5-S1 TLIFs, T12-L1, L1-L2, L2-L3, L3-L4 posterior column osteotomies for coronal scoliosis deformity correction with Dr. Allen on 11/20. Post op course c/b post op anemia requiring multiple pRBC, airway edema s/p extubation, treated w/ steroids, dysphagia, persistent R sided weakness/BLE w/paresthesias post op (XR stable, MRI stable posterior fusion, no spinal cord compression; self limiting). Patient was evaluated by PM&R and therapy for functional deficits, gait/ADL impairments and acute rehabilitation was recommended. Patient was cleared for discharge to [facility] IRF on 12/2/24.    #Severe kyphoscoliosis of the thoracolumbar spine with trunk shift and severe stenosis at L4-5 s/p surgery  - S/P  instrumentation and fusion surgery by Dr. Cox (11/20/2024)      * T11-pelvis instrumentation and fusion      * L4-L5, L5-S1 TLIFs      * T12-L1, L1-L2, L2-L3, L3-L4 posterior column osteotomies for coronal scoliosis deformity correction      * Pain management: Tylenol PRN, Robaxin 500mg TID PRN, Tramadol 25mg q6h PRN, Gabapentin 400QD/ 600mg HS      * OOB w/ TLSO  - Follow-up T/L spine MRI (11/25/24)      * Redemonstration of posterior fusion of T11-S1, bilateral sacroiliac fusion and multilevel facetectomies      * No gross evidence for abnormal intrinsic cord signal. No spinal cord compression, no significant spinal canal stenosis in the thoracic region.       * Expected post-op edema throughout the posterior paraspinal soft tissue at the levels of the surgery  - Deficits: Post op persistent BLE weakness/paresthesias   - Comprehensive Multidisciplinary Rehab Program:      * 3 hours a day, 5 days a week.      * PT 2hr/day: Focused on improving strength, endurance, coordination, balance, functional mobility, and transfers      * OT 1hr/day: Focused on improving strength, fine motor skills, coordination, posture and ADLs.    - Activity Limitations: Decreased social, vocational and leisure activities, decreased self care and ADLs, decreased mobility, decreased ability to manage household and finances.     -----------------------------------------------------------------------------------  Concurrent Medical Problems    #Airway edema s/p extubation  - S/P dexamethasone treatment     #PAF  #PPM  - PPM [employer], interrogated on 11/25 for MRI - shows measured data WNL, normal pacemaker function, NOT dependent.  - 11/22 TTE: LV systolic fxn normal w/ EF 64%, normal RV systolic fxn, mild to modr TR  - Metoprolol 50mg BID  - Xarelto 20mg daily to resume on 12/9 --> Last dose Lovenox on 12/8    #Post op anemia (11/22)   - S/P 5U rRBC- lowest hgb 6.3 - 11/22  - S/P 3U FFP   - Monitor H/H (11.2/34.2 - 12/2)  - Transfuse Hgb <7 PRN    #RUE ecchymosis  #Radial artery occulusion RUE   - 11/22 LED negative  - 11/23 RUE Art Duplex: Right radial artery demonstrates diminished flow velocity at the mid forearm and is occluded at distal forearm. There is retrograde right radial artery flow at the wrist. Surrounding soft tissue edema is noted.   Antegrade flow is noted of the right ulnar artery. Patient's right arm is bruised however is warm, skin is pink, 1+pulse with good capillary refills, discussed results with patient.   - 11/23 and 11/25 RUE/LUE Dopp: negative for upper ext DVT.   - Monitor for s/s of ischemia     #HTN/HLD  - Dwrvateodbs72.5mg/TJWO87ly daily  - Rosuvastatin 10mg HS    #Mood/Cognition:  Neuropsychology consult PRN    #Sleep:   Maintain quiet hours and low stim environment.  Melatonin 6mg HS PRN to maximize participation in therapy during the day.     #GI/Bowel:  Senna QHS, Miralax PRN Daily, Bisacodyl suppository 10mg daily  GI ppx: Pantoprazole 40mg daily     #/Bladder:   - PVR x1 on admission (SC if > 400)    #Skin/Pressure Injury Assessment:   - Skin assessment on admission: ST L groin 4.5x1.5cm, 2.5x0.5cm ST mid upper back, 26cm mid thoracic/lumbar incision, scabbed, CDI w/ 3 drain sites, L gluteal blanchable redness with localized pustules, BLUE ecchymosis (R>L)    #Diet  Current Diet: Regular diet  Nutrition consult     #Precautions / PROPHYLAXIS:   - Falls, Spinal  - ortho: Weight bearing status: WBAT, OOB w/ TLSO brace  - Lungs: Aspiration, Incentive Spirometer   - DVT ppx: Lovenox 40mg daily until 12/8, Xarelto 20mg daily starting 12/9, SCDs  - BLE Duplex US r/o DVT on admission ---> b/l calf pain  - Pressure injury/Skin: OOB to Chair, PT/OT      ---------------  Code Status: FULL  Emergency Contact:    Outpatient Follow-up (Specialty/Name of physician):    Jony Allen  Neurosurgery  [address], [address]  [place] [address]  Phone: [phone]  Fax: [phone]  Follow Up Time:     DREW AMAYA  [address], [address]  [place] [address]      --------------  Goals: Safe discharge to Home*****  Estimated Length of Stay: 10-14 days  Rehab Potential: Good  Medical Prognosis: Good  Estimated Disposition: Home with Home Care  ---------------    PRESCREEN COMPARISON:  I have reviewed the prescreen information and I have found no relevant changes between the preadmission screening and my post admission evaluation.    RATIONALE FOR INPATIENT ADMISSION: Patient demonstrates the following:  [X] Medically appropriate for rehabilitation admission  [X] Has attainable rehab goals with an appropriate initial discharge plan  [X]Has rehabilitation potential (expected to make a significant improvement within a reasonable period of time)  [X] Requires close medical management by a rehab physician, rehab nursing care, Hospitalist and comprehensive interdisciplinary team (including PT, OT and/or SLP, Prosthetics and Orthotics)       Assessment/Plan:  Mrs. Ct Levine 80 y/o F w/ HTN, HLD, Afib atrial flutter, sick sinus syndrome/heart block, atrial flutter s/p cardiac ablation x1 ( 2016), PPM dependent dual chamber status (2013), breast augmentation, and chronic lower back pain who presented to [facility] on 11/20 w/c/o non radiating lower back pain, BLE weakness (R>L) and difficulty ambulating. Surgical management was recommended and she is s/p T11-pelvis instrumentation and fusion; L4-L5, L5-S1 TLIFs, T12-L1, L1-L2, L2-L3, L3-L4 posterior column osteotomies for coronal scoliosis deformity correction with Dr. Allen on 11/20. Post op course c/b post op anemia requiring multiple pRBC, airway edema s/p extubation, treated w/ steroids, dysphagia, persistent R sided weakness/BLE w/paresthesias post op (XR stable, MRI stable posterior fusion, no spinal cord compression; self limiting). Patient was evaluated by PM&R and therapy for functional deficits, gait/ADL impairments and acute rehabilitation was recommended. Patient was cleared for discharge to [facility] IRF on 12/2/24.    #Severe kyphoscoliosis of the thoracolumbar spine with trunk shift and severe stenosis at L4-5 s/p surgery  - S/P  instrumentation and fusion surgery by Dr. Cox (11/20/2024)      * T11-pelvis instrumentation and fusion      * L4-L5, L5-S1 TLIFs      * T12-L1, L1-L2, L2-L3, L3-L4 posterior column osteotomies for coronal scoliosis deformity correction      * Pain management: Tylenol PRN, Robaxin 500mg TID PRN, Tramadol 25mg q6h PRN, Gabapentin 400QD/ 600mg HS      * TLSO on ambulation  - Follow-up T/L spine MRI (11/25/24)      * Redemonstration of posterior fusion of T11-S1, bilateral sacroiliac fusion and multilevel facetectomies      * No gross evidence for abnormal intrinsic cord signal. No spinal cord compression, no significant spinal canal stenosis in the thoracic region.       * Expected post-op edema throughout the posterior paraspinal soft tissue at the levels of the surgery  - Deficits: Post op persistent BLE weakness/paresthesias   - Comprehensive Multidisciplinary Rehab Program:      * 3 hours a day, 5 days a week.      * PT 2hr/day: Focused on improving strength, endurance, coordination, balance, functional mobility, and transfers      * OT 1hr/day: Focused on improving strength, fine motor skills, coordination, posture and ADLs.    - Activity Limitations: Decreased social, vocational and leisure activities, decreased self care and ADLs, decreased mobility, decreased ability to manage household and finances.     -----------------------------------------------------------------------------------  Concurrent Medical Problems    #Airway edema s/p extubation  - S/P dexamethasone treatment     #PAF  #PPM  - PPM [employer], interrogated on 11/25 for MRI - shows measured data WNL, normal pacemaker function, NOT dependent.  - 11/22 TTE: LV systolic fxn normal w/ EF 64%, normal RV systolic fxn, mild to modr TR  - Metoprolol 50mg BID  - Xarelto 20mg daily to resume on 12/9 --> Last dose Lovenox on 12/8    #Post op anemia (11/22)   - S/P 5U rRBC- lowest hgb 6.3 - 11/22  - S/P 3U FFP   - Monitor H/H (11.2/34.2 - 12/2)  - Transfuse Hgb <7 PRN    #RUE ecchymosis  #Radial artery occulusion RUE   - 11/22 LED negative  - 11/23 RUE Art Duplex: Right radial artery demonstrates diminished flow velocity at the mid forearm and is occluded at distal forearm. There is retrograde right radial artery flow at the wrist. Surrounding soft tissue edema is noted.   Antegrade flow is noted of the right ulnar artery. Patient's right arm is bruised however is warm, skin is pink, 1+pulse with good capillary refills, discussed results with patient.   - 11/23 and 11/25 RUE/LUE Dopp: negative for upper ext DVT.   - Monitor for s/s of ischemia     #HTN/HLD  - Ncugeghwzeh37.5mg/PDLB98ky daily  - Rosuvastatin 10mg HS    #Mood/Cognition:  Neuropsychology consult PRN    #Sleep:   Maintain quiet hours and low stim environment.  Melatonin 6mg HS PRN to maximize participation in therapy during the day.     #GI/Bowel:  Senna QHS, Miralax PRN Daily, Bisacodyl suppository 10mg daily  GI ppx: Pantoprazole 40mg daily     #/Bladder:   - PVR x1 on admission (SC if > 400)    #Skin/Pressure Injury Assessment:   - Skin assessment on admission: ST L groin 4.5x1.5cm, 2.5x0.5cm ST mid upper back, 26cm mid thoracic/lumbar incision, scabbed, CDI w/ 3 drain sites, L gluteal blanchable redness with localized pustules, BLUE ecchymosis (R>L)    #Diet  Current Diet: Regular diet  Nutrition consult     #Precautions / PROPHYLAXIS:   - Falls, Spinal  - ortho: Weight bearing status: WBAT, OOB w/ TLSO brace  - Lungs: Aspiration, Incentive Spirometer   - DVT ppx: Lovenox 40mg daily until 12/8, Xarelto 20mg daily starting 12/9, SCDs  - BLE Duplex US r/o DVT on admission ---> b/l calf pain  - Pressure injury/Skin: OOB to Chair, PT/OT      ---------------  Code Status: FULL  Emergency Contact:    Outpatient Follow-up (Specialty/Name of physician):    Jony Allen  Neurosurgery  [address], [address]  [place] [address]  Phone: [phone]  Fax: [phone]  Follow Up Time:     DREW AMAYA  [address], [address]  [place] [address]      --------------  Goals: Safe discharge to Home*****  Estimated Length of Stay: 10-14 days  Rehab Potential: Good  Medical Prognosis: Good  Estimated Disposition: Home with Home Care  ---------------    PRESCREEN COMPARISON:  I have reviewed the prescreen information and I have found no relevant changes between the preadmission screening and my post admission evaluation.    RATIONALE FOR INPATIENT ADMISSION: Patient demonstrates the following:  [X] Medically appropriate for rehabilitation admission  [X] Has attainable rehab goals with an appropriate initial discharge plan  [X]Has rehabilitation potential (expected to make a significant improvement within a reasonable period of time)  [X] Requires close medical management by a rehab physician, rehab nursing care, Hospitalist and comprehensive interdisciplinary team (including PT, OT and/or SLP, Prosthetics and Orthotics)

## 2024-12-02 NOTE — PROGRESS NOTE ADULT - SUBJECTIVE AND OBJECTIVE BOX
Patient is a 79y old  Female who presents with a chief complaint of scoliosis (02 Dec 2024 11:17)      SUBJECTIVE / OVERNIGHT EVENTS: Up in chair, feels well.     MEDICATIONS  (STANDING):  bisacodyl Suppository 10 milliGRAM(s) Rectal daily  enoxaparin Injectable 40 milliGRAM(s) SubCutaneous <User Schedule>  gabapentin 400 milliGRAM(s) Oral <User Schedule>  gabapentin 600 milliGRAM(s) Oral at bedtime  metoprolol tartrate 50 milliGRAM(s) Oral two times a day  pantoprazole    Tablet 40 milliGRAM(s) Oral before breakfast  polyethylene glycol 3350 17 Gram(s) Oral two times a day  rosuvastatin 10 milliGRAM(s) Oral at bedtime  senna 2 Tablet(s) Oral at bedtime  triamterene 37.5 mG/hydrochlorothiazide 25 mG Tablet 1 Tablet(s) Oral daily    MEDICATIONS  (PRN):  acetaminophen     Tablet .. 650 milliGRAM(s) Oral every 6 hours PRN Temp greater or equal to 38C (100.4F), Mild Pain (1 - 3)  methocarbamol 500 milliGRAM(s) Oral every 8 hours PRN Muscle Spasm  traMADol 25 milliGRAM(s) Oral every 6 hours PRN Moderate Pain (4 - 6)      CAPILLARY BLOOD GLUCOSE        I&O's Summary    02 Dec 2024 07:01  -  02 Dec 2024 12:25  --------------------------------------------------------  IN: 480 mL / OUT: 0 mL / NET: 480 mL        PHYSICAL EXAM:  T(C): 36.6 (12-02-24 @ 08:00), Max: 37.2 (12-01-24 @ 15:56)  HR: 74 (12-02-24 @ 08:00) (69 - 84)  BP: 132/68 (12-02-24 @ 08:00) (114/67 - 139/81)  RR: 18 (12-02-24 @ 08:00) (18 - 18)  SpO2: 98% (12-02-24 @ 08:00) (93% - 98%)    Awake, alert, RRR, abdomen soft, good air entry anteriorly, moving all extremities    LABS:                        11.2   9.32  )-----------( 352      ( 02 Dec 2024 08:13 )             34.2     12-02    135  |  95[L]  |  17  ----------------------------<  120[H]  3.5   |  25  |  0.62    Ca    9.4      02 Dec 2024 08:13            Urinalysis Basic - ( 02 Dec 2024 08:13 )    Color: x / Appearance: x / SG: x / pH: x  Gluc: 120 mg/dL / Ketone: x  / Bili: x / Urobili: x   Blood: x / Protein: x / Nitrite: x   Leuk Esterase: x / RBC: x / WBC x   Sq Epi: x / Non Sq Epi: x / Bacteria: x        RADIOLOGY & ADDITIONAL TESTS:    Imaging Personally Reviewed:    Consultant(s) Notes Reviewed:      Care Discussed with Consultants/Other Providers: Nsx

## 2024-12-02 NOTE — H&P ADULT - NS ATTEND AMEND GEN_ALL_CORE FT
I independently performed the documented the history, exam, and medical decision making. I have made amendments to the documentation where necessary. I have personally seen and examined the patient. Medical records were reviewed and I have made amendments to the documentation where necessary and adjusted the history, physical examination, and plan as documented by the Resident Physician.    LAB                        11.3   8.96  )-----------( 357      ( 03 Dec 2024 05:33 )             33.6     12-03    133[L]  |  96  |  16  ----------------------------<  123[H]  3.8   |  32[H]  |  0.71    Ca    8.9      03 Dec 2024 05:33    TPro  6.5  /  Alb  2.5[L]  /  TBili  0.6  /  DBili  x   /  AST  26  /  ALT  24  /  AlkPhos  89  12-03    LIVER FUNCTIONS - ( 03 Dec 2024 05:33 )  Alb: 2.5 g/dL / Pro: 6.5 g/dL / ALK PHOS: 89 U/L / ALT: 24 U/L / AST: 26 U/L / GGT: x             PHYSICAL EXAM  Vital Signs Last 24 Hrs  T(C): 36.6 (03 Dec 2024 19:51), Max: 36.6 (03 Dec 2024 09:10)  T(F): 97.9 (03 Dec 2024 19:51), Max: 97.9 (03 Dec 2024 19:51)  HR: 65 (03 Dec 2024 19:51) (60 - 86)  BP: 129/65 (03 Dec 2024 19:51) (125/74 - 144/53)  RR: 16 (03 Dec 2024 19:51) (16 - 18)  SpO2: 97% (03 Dec 2024 19:51) (92% - 97%)    Gen - NAD, Comfortable  HEENT - NCAT, EOMI, MMM, PERRLA, Normal Conjunctivae  Neck - Supple, No limited ROM  Pulm - CTAB, No wheeze, No rhonchi, No crackles  Cardiovascular - RRR, S1S2, No murmurs  Chest - good chest expansion, good respiratory effort  Abdomen - Soft, NT/ND, +BS  Extremities - No C/C, + B/L calf tenderness, + B/L ankle and pedal edema   Neuro-     Cognitive - awake, alert, oriented to person, place, date, year, and situation.  Able  to follow command     Communication - Fluent, Comprehensible, No dysarthria, No aphasia      Cranial Nerves -No facial asymmetry, Tongue midline, EOMI, Shoulder shrug intact     Motor -                     LEFT    UE - ShAB 5/5, EF 5/5, EE 5/5,  5/5                    RIGHT UE - ShAB 5/5, EF 5/5, EE 5/5,   5/5                    LEFT    LE - HF 2/5, KE 2/5, DF 4/5, PF 5/5                    RIGHT LE - HF 1/5, KE 2/5, DF 2/5, PF 5/5        Sensory - Intact  to LT      Coordination - FTN Intact, Unable to HTS 2/2 BLE weakness     Tone - Normal  MSK: Soreness across R side abd, R anterior thigh, R ankle   Psychiatric - Mood stable, Affect WNL  Skin:  ST L groin 4.5x1.5cm, 2.5x0.5cm ST mid upper back, 26cm mid thoracic/lumbar incision, scabbed, CDI w/ 3 drain sites, L gluteal blanchable redness with localized pustules, BLUE ecchymosis (R>L)

## 2024-12-02 NOTE — H&P ADULT - NSHPSOCIALHISTORY_GEN_ALL_CORE
SOCIAL HISTORY  Smoking - denies  EtOH - denies  Drugs - denies    FUNCTIONAL HISTORY  Patient resides in a pvt home w/ daughter, 2 steps to enter, no additional steps inside. PTA pt was independent with all mobility & ADL's. Did not use an AD for ambulation.    CURRENT FUNCTIONAL STATUS  - Bed Mobility: Mod A; 2PA; bed rails  - Transfers: Mod A; 2PA; FWB; OOB w/ TLSO brace  - Gait: Mod A; 2PA; FWB; 5 feet side stepping; OOB w/ TLSO brace    - ADLs:  -Lower Body Dressing: Max A; 1PA SOCIAL HISTORY  Smoking - denies  EtOH - occasional 1 glass of wine 2x/week   Drugs - denies    Occupation: Elder law/estate trust      FUNCTIONAL HISTORY  Patient resides in a pvt home w/ daughter, 2 steps to enter, no additional steps inside. PTA pt was independent with all mobility & ADL's. Did not use an AD for ambulation.    CURRENT FUNCTIONAL STATUS  - Bed Mobility: Mod A; 2PA; bed rails  - Transfers: Mod A; 2PA; FWB; OOB w/ TLSO brace  - Gait: Mod A; 2PA; FWB; 5 feet side stepping; OOB w/ TLSO brace    - ADLs:  -Lower Body Dressing: Max A; 1PA

## 2024-12-02 NOTE — PROGRESS NOTE ADULT - PROBLEM SELECTOR PLAN 4
On Lovenox for DVT prophylaxis.    Rehab today.
On Lovenox for DVT prophylaxis.
On Lovenox for DVT prophylaxis.
Lovenox per neurosurg

## 2024-12-02 NOTE — H&P ADULT - NSHPPHYSICALEXAM_GEN_ALL_CORE
PHYSICAL EXAM  VITALS  T(C): 37 (12-02-24 @ 19:18), Max: 37.1 (12-02-24 @ 16:22)  HR: 80 (12-02-24 @ 19:18) (72 - 80)  BP: 132/79 (12-02-24 @ 19:18) (118/67 - 145/75)  RR: 18 (12-02-24 @ 19:18) (18 - 18)  SpO2: 97% (12-02-24 @ 19:18) (93% - 99%)    Gen - NAD, Comfortable  HEENT - NCAT, EOMI, MMM, PERRLA, Normal Conjunctivae  Neck - Supple, No limited ROM  Pulm - CTAB, No wheeze, No rhonchi, No crackles  Cardiovascular - RRR, S1S2, No murmurs  Chest - good chest expansion, good respiratory effort  Abdomen - Soft, NT/ND, +BS  Extremities - No C/C, + B/L calf tenderness, + B/L ankle and pedal edema   Neuro-     Cognitive - awake, alert, oriented to person, place, date, year, and situation.  Able  to follow command     Communication - Fluent, Comprehensible, No dysarthria, No aphasia      Cranial Nerves -No facial asymmetry, Tongue midline, EOMI, Shoulder shrug intact     Motor -                     LEFT    UE - ShAB 5/5, EF 5/5, EE 5/5,  5/5                    RIGHT UE - ShAB 5/5, EF 5/5, EE 5/5,   5/5                    LEFT    LE - HF 2/5, KE 2/5, DF 4/5, PF 5/5                    RIGHT LE - HF 1/5, KE 2/5, DF 2/5, PF 5/5        Sensory - Intact  to LT      Coordination - FTN Intact, Unable to HTS 2/2 BLE weakness     Tone - Normal  MSK: Soreness across R side abd, R anterior thigh, R ankle   Psychiatric - Mood stable, Affect WNL  Skin:  ST L groin 4.5x1.5cm, 2.5x0.5cm ST mid upper back, 26cm mid thoracic/lumbar incision, scabbed, CDI w/ 3 drain sites, L gluteal blanchable redness with localized pustules, BLUE ecchymosis (R>L)

## 2024-12-02 NOTE — PROGRESS NOTE ADULT - ASSESSMENT
79F PMH AFib/flutter (DOAC stopped for OR), sick sinus syndrome s/p dual chamber PPM, HTN/HLD admitted for T11-pelvis fusion to address Low back pain and coronal scoliosis.     Now s/p T11-pelvis instrumentation and fusion; L4-L5, L5-S1 TLIFs, T12-L1, L1-L2, L2-L3, L3-L4 posterior column osteotomies for coronal scoliosis deformity correction 11/20/2024.     Intraop EBL 1.5L given 3UPRBc, 3U FFP, 2U PLT s/p 2 U PRBC on 11/22.    Persistent R sided weakness post procedure- now improving.

## 2024-12-02 NOTE — PROGRESS NOTE ADULT - SUBJECTIVE AND OBJECTIVE BOX
SUBJECTIVE: HPI:  79 year  old RHD  female  with  c/o non radiating lower back pain for  many years, pain become more  troublesome for last 4 yrs  with  severe pain and lower extremity weakness of which sometimes worse in right side is worse . She tried all pain management modalities like NSAIDS, , physical therapy, epidural  injects and facet blocks with no relief at all".   Pt states she is having scoliosis which made symptoms worse with few  episodes of right side sciatic pain. Her pain increases with ambulation and movement and decreases with sitting. Worse pain is standing still. No recent trauma. S/p surgical consult & scheduled for T11-Pelvis posterior stabilization and fusion on 11/19/2024.    PMH : HTN, HLD, Afib atrial flutter in 2013 ( on Xarelto per patient surgeon advised to stop for 10 days last dose 11/10/24), h/o sick sinus syndrome/ haert block,  atrial flutter s/p cardiac ablation x1 ( 2016), PPM  dependent dual chamber status since 2013( revised / replaced batter in 2023/last interrogation in 06/2024). history of breast augmentation  (silicon breast implants) & chronic lower back pain.  ? (01 Nov 2024 08:29)      OVERNIGHT EVENTS: No acute events overnight, patient seen and evaluated at bedside this morning, c/o right thigh aching pain however states that this has been improving from before.     Vital Signs Last 24 Hrs  T(C): 36.5 (02 Dec 2024 04:00), Max: 37.2 (01 Dec 2024 15:56)  T(F): 97.7 (02 Dec 2024 04:00), Max: 98.9 (01 Dec 2024 15:56)  HR: 72 (02 Dec 2024 04:00) (69 - 84)  BP: 118/67 (02 Dec 2024 04:00) (114/67 - 139/81)  BP(mean): --  RR: 18 (02 Dec 2024 04:00) (18 - 18)  SpO2: 93% (02 Dec 2024 04:00) (93% - 98%)    Parameters below as of 02 Dec 2024 04:00  Patient On (Oxygen Delivery Method): room air        DRAINS: None    PHYSICAL EXAM:    Constitutional: No Acute Distress     Neurological: AOx3, Following Commands, Moving all Extremities     Motor exam:          Upper extremity                         Delt     Bicep     Tricep    HG                                                 R         5/5        5/5        5/5       5/5                                               L          5/5        5/5        5/5       5/5          Lower extremity                        HF         KF        KE       DF         PF                                                  R        4/5        4/5        4/5       4+/5         4+/5                                               L         5/5        5/5       5/5       5/5          5/5                                                 Sensation: [] intact to light touch  [x] decreased: RLE numbness baseline    Pulmonary: Clear to Auscultation, No rales, No rhonchi, No wheezes     Cardiovascular: S1, S2, Regular rate and rhythm     Gastrointestinal: Soft, Non-tender, Non-distended     Extremities: No calf tenderness     Incision: thoracic to pelvis surgical incision healing well, C/D/I    LABS:                        11.2   9.32  )-----------( 352      ( 02 Dec 2024 08:13 )             34.2            MEDICATIONS:  Antibiotics:    Neuro:  acetaminophen     Tablet .. 650 milliGRAM(s) Oral every 6 hours PRN Temp greater or equal to 38C (100.4F), Mild Pain (1 - 3)  gabapentin 400 milliGRAM(s) Oral <User Schedule>  gabapentin 600 milliGRAM(s) Oral at bedtime  methocarbamol 500 milliGRAM(s) Oral every 8 hours PRN Muscle Spasm  traMADol 25 milliGRAM(s) Oral every 6 hours PRN Moderate Pain (4 - 6)    Cardiac:  metoprolol tartrate 50 milliGRAM(s) Oral two times a day  triamterene 37.5 mG/hydrochlorothiazide 25 mG Tablet 1 Tablet(s) Oral daily    Pulm:    GI/:  bisacodyl Suppository 10 milliGRAM(s) Rectal daily  pantoprazole    Tablet 40 milliGRAM(s) Oral before breakfast  polyethylene glycol 3350 17 Gram(s) Oral two times a day  senna 2 Tablet(s) Oral at bedtime    Other:   enoxaparin Injectable 40 milliGRAM(s) SubCutaneous <User Schedule>  rosuvastatin 10 milliGRAM(s) Oral at bedtime    DIET: [x] Regular [] CCD [] Renal [] Puree [] Dysphagia [] Tube Feeds:     IMAGING:   < from: MR Thoracic Spine No Cont (11.25.24 @ 12:27) >  IMPRESSION:    MRI THORACIC SPINE:  Redemonstration of posterior fusion of T11-S1, bilateral sacroiliac   fusion, and multilevel facetectomies.    No gross evidence for abnormal intrinsic cord signal.    No spinal cord compression.    No significant spinal canal stenosis in the thoracic region. Mild   multilevel neural foraminal narrowing lower thoracic region.    Expected postoperative edema throughout the posterior paraspinal soft   tissues from T9 through the inferior extent of the examination.    MRI LUMBAR SPINE:  Redemonstration of posterior fusion of T11-S1, bilateral sacroiliac   fusion, and multilevel facetectomies.    Mild multilevel mild spinal canal stenosis and neural foraminal narrowing   secondary to multilevel degenerative changes characterized by   degenerative disc disease and facet/ligamentous hypertrophy.    Expected postoperative edema throughout the posterior paraspinal soft   tissues at the levels of surgery.    --- End of Report ---      MARIAJOSE MUNOZ MD; Attending Radiologist  This document has been electronically signed. Nov 25 2024  5:02PM    < end of copied text >    < from: [facility] Duplex Upper Ext Vein Scan, Left (11.25.24 @ 13:05) >    IMPRESSION:    No evidence of left upper extremity deep venous thrombosis.    --- End of Report ---    CARMENZA ANDERSON MD; Attending Interventional Radiologist  This document has been electronically signed. Nov 25 2024  1:15PM    < end of copied text >    < from: VA Duplex Lower Ext Vein Scan, Bilat (11.22.24 @ 13:27) >  IMPRESSION:  No evidence of deep venous thrombosis in either lower extremity.      --- End of Report ---    MARICRUZ HANSON MD; Attending Radiologist  This document has been electronically signed. Nov 22 2024  3:37PM    < end of copied text >

## 2024-12-02 NOTE — PROGRESS NOTE ADULT - ASSESSMENT
ASSESSMENT AND PLAN: 79 year  old RHD  female  with  c/o non radiating lower back pain for  many years, pain become more  troublesome for last 4 yrs  with  severe pain and lower extremity weakness of which sometimes worse in right side is worse . She tried all pain management modalities like NSAIDS, , physical therapy, epidural  injects and facet blocks with no relief at all".   Pt states she is having scoliosis which made symptoms worse with few  episodes of right side sciatic pain. Her pain increases with ambulation and movement and decreases with sitting. Worse pain is standing still. No recent trauma.    11/20 s/p T11-pelvis instrumentation and fusion; L4-L5, L5-S1 TLIFs, T12-L1, L1-L2, L2-L3, L3-L4 posterior column osteotomies for coronal scoliosis deformity correction    NEURO:   - Continue neuro checks q 4  - TLSO whenever OOB  - 11/25 MR T/L spine shows redemonstration of posterior fusion of T11-S1, bilateral sacroiliac fusion and multilevel facetectomies, no gross evidence for abnormal intrinsic cord signal. No spinal cord compression, no significant spinal canal stenosis in the thoracic region. Expected post-op edema throughout the posterior paraspinal soft tissue at the levels of the surgery.   - Pain control w/ Tylenol prn and Tramadol prn  - Gabapentin for neuropathic pain  - Robaxin for muscle spasms  - PT/OT/PMR: Acute Rehab    PULM:   - On room air, O2Sat>93%  - Incentive spirometry    CV:  - -160  - PPM [employer], interrogated on 11/25 for MRI - shows measured data WNL, normal pacemaker function, NOT dependent.  - Afib: continue Metoprolol, Xarelto may be restarted on 12/9/24  - Continue Maxzide for HTN  - 11/22 TTE: LV systolic fxn normal w/ EF 64%, normal RV systolic fxn, mild to modr TR  - Continue Crestor for HLD    ENDO:   - A1c 5.4, goal euglycemia    HEME/ONC:             12/2 CBC post-op anemia stable. 11/22 Transfused 2PRBC.         DVT ppx: SQL, SCDs, 11/22 LED negative    RENAL:   - IVL  - 12/2 BMP K 3.5 - supplemented w/ KCl 20meq x 2doses  - Voiding    ID:   - Afebrile  - Was on Ancef for post-op abx and was on it while drains are on it    GI:    - Continue oral diet  - Senna, Miralax and Dulcolax, last BM 11/29  - Continue Protonix    Appreciate Hospitalist following for co-medical management.     DISCHARGE PLANNING:   AR pending    Plan to be discussed w/ Dr. Allen  [id number]    ASSESSMENT AND PLAN: 79 year  old RHD  female  with  c/o non radiating lower back pain for  many years, pain become more  troublesome for last 4 yrs  with  severe pain and lower extremity weakness of which sometimes worse in right side is worse . She tried all pain management modalities like NSAIDS, , physical therapy, epidural  injects and facet blocks with no relief at all".   Pt states she is having scoliosis which made symptoms worse with few  episodes of right side sciatic pain. Her pain increases with ambulation and movement and decreases with sitting. Worse pain is standing still. No recent trauma.    11/20 s/p T11-pelvis instrumentation and fusion; L4-L5, L5-S1 TLIFs, T12-L1, L1-L2, L2-L3, L3-L4 posterior column osteotomies for coronal scoliosis deformity correction    NEURO:   - Continue neuro checks q 4  - TLSO whenever OOB  - 11/25 MR T/L spine shows redemonstration of posterior fusion of T11-S1, bilateral sacroiliac fusion and multilevel facetectomies, no gross evidence for abnormal intrinsic cord signal. No spinal cord compression, no significant spinal canal stenosis in the thoracic region. Expected post-op edema throughout the posterior paraspinal soft tissue at the levels of the surgery.   - Pain control w/ Tylenol prn and Tramadol prn  - Gabapentin for neuropathic pain  - Robaxin for muscle spasms  - PT/OT/PMR: Acute Rehab    PULM:   - On room air, O2Sat>93%  - Incentive spirometry    CV:  - -160  - PPM [employer], interrogated on 11/25 for MRI - shows measured data WNL, normal pacemaker function, NOT dependent.  - Afib: continue Metoprolol, Xarelto may be restarted on 12/9/24  - Continue Maxzide for HTN  - 11/22 TTE: LV systolic fxn normal w/ EF 64%, normal RV systolic fxn, mild to modr TR  - Continue Crestor for HLD    ENDO:   - A1c 5.4, goal euglycemia    HEME/ONC:             12/2 CBC post-op anemia stable. 11/22 Transfused 2PRBC.         DVT ppx: SQL, SCDs, 11/22 LED negative, 11/23 RUE Art Duplex: Right radial artery demonstrates diminished flow velocity at the mid forearm and is occluded at distal forearm. There is retrograde right radial artery flow at the wrist. Surrounding soft tissue edema is noted.   Antegrade flow is noted of the right ulnar artery. Patient's right arm is bruised however is warm, skin is pink, 1+pulse with good capillary refills, discussed results with patient. 11/23 and 11/25 RUE/LUE Dopp: negative for upper ext DVT.     RENAL:   - IVL  - 12/2 BMP K 3.5 - supplemented w/ KCl 20meq x 2doses  - Voiding    ID:   - Afebrile  - Was on Ancef for post-op abx and was on it while drains are on it    GI:    - Continue oral diet  - Senna, Miralax and Dulcolax, last BM 11/29  - Continue Protonix    Appreciate Hospitalist following for co-medical management.     DISCHARGE PLANNING:   AR pending    Plan to be discussed w/ Dr. Allen  [id number]

## 2024-12-02 NOTE — PROGRESS NOTE ADULT - TIME BILLING
chart review, interview, physical exam, coordination with staff and consultants, medical decision making and documentation
chart review, interview, physical exam, coordination with staff and consultants, medical decision making and documentation, orders
chart review, interview, physical exam, coordination with staff and consultants, medical decision making and documentation
chart review, interview, physical exam, coordination with staff and consultants, medical decision making and documentation
verbal instruction/written material

## 2024-12-02 NOTE — DISCHARGE NOTE NURSING/CASE MANAGEMENT/SOCIAL WORK - NSDCPEFALRISK_GEN_ALL_CORE
For information on Fall & Injury Prevention, visit: [email or web address] OR  [email or web address] OR  https://www.cdc.gov/steadi/patient.html

## 2024-12-02 NOTE — DISCHARGE NOTE NURSING/CASE MANAGEMENT/SOCIAL WORK - NSDCVIVACCINE_GEN_ALL_CORE_FT
Td (adult) preservative free; 26-Dec-2020 10:09; Anum Muñoz (ERNESTINA); [employer]; a118a (Exp. Date: 11-Apr-2021); IntraMuscular; Deltoid Left.; 0.5 milliLiter(s); VIS (VIS Published: 26-Dec-2020, VIS Presented: 26-Dec-2020);

## 2024-12-03 LAB
ALBUMIN SERPL ELPH-MCNC: 2.5 G/DL — LOW (ref 3.3–5)
ALP SERPL-CCNC: 89 U/L — SIGNIFICANT CHANGE UP (ref 40–120)
ALT FLD-CCNC: 24 U/L — SIGNIFICANT CHANGE UP (ref 10–45)
ANION GAP SERPL CALC-SCNC: 5 MMOL/L — SIGNIFICANT CHANGE UP (ref 5–17)
AST SERPL-CCNC: 26 U/L — SIGNIFICANT CHANGE UP (ref 10–40)
BASOPHILS # BLD AUTO: 0.02 K/UL — SIGNIFICANT CHANGE UP (ref 0–0.2)
BASOPHILS NFR BLD AUTO: 0.2 % — SIGNIFICANT CHANGE UP (ref 0–2)
BILIRUB SERPL-MCNC: 0.6 MG/DL — SIGNIFICANT CHANGE UP (ref 0.2–1.2)
BUN SERPL-MCNC: 16 MG/DL — SIGNIFICANT CHANGE UP (ref 7–23)
CALCIUM SERPL-MCNC: 8.9 MG/DL — SIGNIFICANT CHANGE UP (ref 8.4–10.5)
CHLORIDE SERPL-SCNC: 96 MMOL/L — SIGNIFICANT CHANGE UP (ref 96–108)
CO2 SERPL-SCNC: 32 MMOL/L — HIGH (ref 22–31)
CREAT SERPL-MCNC: 0.71 MG/DL — SIGNIFICANT CHANGE UP (ref 0.5–1.3)
EGFR: 87 ML/MIN/1.73M2 — SIGNIFICANT CHANGE UP
EOSINOPHIL # BLD AUTO: 0.13 K/UL — SIGNIFICANT CHANGE UP (ref 0–0.5)
EOSINOPHIL NFR BLD AUTO: 1.5 % — SIGNIFICANT CHANGE UP (ref 0–6)
GLUCOSE SERPL-MCNC: 123 MG/DL — HIGH (ref 70–99)
HCT VFR BLD CALC: 33.6 % — LOW (ref 34.5–45)
HGB BLD-MCNC: 11.3 G/DL — LOW (ref 11.5–15.5)
IMM GRANULOCYTES NFR BLD AUTO: 0.4 % — SIGNIFICANT CHANGE UP (ref 0–0.9)
LYMPHOCYTES # BLD AUTO: 1.16 K/UL — SIGNIFICANT CHANGE UP (ref 1–3.3)
LYMPHOCYTES # BLD AUTO: 12.9 % — LOW (ref 13–44)
MCHC RBC-ENTMCNC: 29.4 PG — SIGNIFICANT CHANGE UP (ref 27–34)
MCHC RBC-ENTMCNC: 33.6 G/DL — SIGNIFICANT CHANGE UP (ref 32–36)
MCV RBC AUTO: 87.3 FL — SIGNIFICANT CHANGE UP (ref 80–100)
MONOCYTES # BLD AUTO: 0.79 K/UL — SIGNIFICANT CHANGE UP (ref 0–0.9)
MONOCYTES NFR BLD AUTO: 8.8 % — SIGNIFICANT CHANGE UP (ref 2–14)
NEUTROPHILS # BLD AUTO: 6.82 K/UL — SIGNIFICANT CHANGE UP (ref 1.8–7.4)
NEUTROPHILS NFR BLD AUTO: 76.2 % — SIGNIFICANT CHANGE UP (ref 43–77)
NRBC # BLD: 0 /100 WBCS — SIGNIFICANT CHANGE UP (ref 0–0)
PLATELET # BLD AUTO: 357 K/UL — SIGNIFICANT CHANGE UP (ref 150–400)
POTASSIUM SERPL-MCNC: 3.8 MMOL/L — SIGNIFICANT CHANGE UP (ref 3.5–5.3)
POTASSIUM SERPL-SCNC: 3.8 MMOL/L — SIGNIFICANT CHANGE UP (ref 3.5–5.3)
PROT SERPL-MCNC: 6.5 G/DL — SIGNIFICANT CHANGE UP (ref 6–8.3)
RBC # BLD: 3.85 M/UL — SIGNIFICANT CHANGE UP (ref 3.8–5.2)
RBC # FLD: 16.2 % — HIGH (ref 10.3–14.5)
SODIUM SERPL-SCNC: 133 MMOL/L — LOW (ref 135–145)
WBC # BLD: 8.96 K/UL — SIGNIFICANT CHANGE UP (ref 3.8–10.5)
WBC # FLD AUTO: 8.96 K/UL — SIGNIFICANT CHANGE UP (ref 3.8–10.5)

## 2024-12-03 PROCEDURE — 99223 1ST HOSP IP/OBS HIGH 75: CPT

## 2024-12-03 PROCEDURE — 93010 ELECTROCARDIOGRAM REPORT: CPT

## 2024-12-03 RX ORDER — TRAMADOL HYDROCHLORIDE 300 MG/1
50 CAPSULE ORAL EVERY 6 HOURS
Refills: 0 | Status: DISCONTINUED | OUTPATIENT
Start: 2024-12-03 | End: 2024-12-09

## 2024-12-03 RX ADMIN — ENOXAPARIN SODIUM 40 MILLIGRAM(S): 30 INJECTION SUBCUTANEOUS at 17:26

## 2024-12-03 RX ADMIN — TRAMADOL HYDROCHLORIDE 50 MILLIGRAM(S): 300 CAPSULE ORAL at 22:10

## 2024-12-03 RX ADMIN — METOPROLOL TARTRATE 50 MILLIGRAM(S): 100 TABLET, FILM COATED ORAL at 05:00

## 2024-12-03 RX ADMIN — GABAPENTIN 600 MILLIGRAM(S): 300 CAPSULE ORAL at 21:09

## 2024-12-03 RX ADMIN — ACETAMINOPHEN 500MG 650 MILLIGRAM(S): 500 TABLET, COATED ORAL at 06:00

## 2024-12-03 RX ADMIN — TRIAMTERENE AND HYDROCHLOROTHIAZIDE 1 TABLET(S): 25; 37.5 CAPSULE ORAL at 05:01

## 2024-12-03 RX ADMIN — GABAPENTIN 400 MILLIGRAM(S): 300 CAPSULE ORAL at 11:20

## 2024-12-03 RX ADMIN — METOPROLOL TARTRATE 50 MILLIGRAM(S): 100 TABLET, FILM COATED ORAL at 17:26

## 2024-12-03 RX ADMIN — TRAMADOL HYDROCHLORIDE 50 MILLIGRAM(S): 300 CAPSULE ORAL at 21:10

## 2024-12-03 RX ADMIN — TRAMADOL HYDROCHLORIDE 25 MILLIGRAM(S): 300 CAPSULE ORAL at 09:00

## 2024-12-03 RX ADMIN — METHOCARBAMOL 500 MILLIGRAM(S): 500 TABLET, FILM COATED ORAL at 05:05

## 2024-12-03 RX ADMIN — ACETAMINOPHEN 500MG 650 MILLIGRAM(S): 500 TABLET, COATED ORAL at 04:52

## 2024-12-03 RX ADMIN — POLYETHYLENE GLYCOL 3350 17 GRAM(S): 17 POWDER, FOR SOLUTION ORAL at 17:29

## 2024-12-03 RX ADMIN — ROSUVASTATIN CALCIUM 10 MILLIGRAM(S): 5 TABLET, FILM COATED ORAL at 21:09

## 2024-12-03 RX ADMIN — TRAMADOL HYDROCHLORIDE 25 MILLIGRAM(S): 300 CAPSULE ORAL at 08:22

## 2024-12-03 RX ADMIN — PANTOPRAZOLE SODIUM 40 MILLIGRAM(S): 40 TABLET, DELAYED RELEASE ORAL at 05:00

## 2024-12-03 RX ADMIN — TRAMADOL HYDROCHLORIDE 50 MILLIGRAM(S): 300 CAPSULE ORAL at 13:00

## 2024-12-03 RX ADMIN — TRAMADOL HYDROCHLORIDE 50 MILLIGRAM(S): 300 CAPSULE ORAL at 12:30

## 2024-12-03 RX ADMIN — Medication 2 TABLET(S): at 21:09

## 2024-12-03 NOTE — DIETITIAN INITIAL EVALUATION ADULT - OTHER INFO
Initial Nutrition Assessment   79yr Old Female   Denies Food Allergy/Intolerance  Tolerates Diet Well - No Chewing/Swallowing Complications (Per Patient)  Surgical Incision on Back & No Pressure Ulcers (as Per Nursing Flow Sheets)  No Edema Noted (as Per Nursing Flow Sheets)  No Recent Nausea/Vomiting/Diarrhea/Constipation (as Per Patient)

## 2024-12-03 NOTE — DIETITIAN NUTRITION RISK NOTIFICATION - TREATMENT: THE FOLLOWING DIET HAS BEEN RECOMMENDED
Recommend Initiate Ensure Plus High Protein 8oz PO Daily (Provides 350kcal & 20grams of Protein)   Nutrition Education Provided

## 2024-12-03 NOTE — DIETITIAN INITIAL EVALUATION ADULT - PERTINENT LABORATORY DATA
12-03    133[L]  |  96  |  16  ----------------------------<  123[H]  3.8   |  32[H]  |  0.71    Ca    8.9      03 Dec 2024 05:33    TPro  6.5  /  Alb  2.5[L]  /  TBili  0.6  /  DBili  x   /  AST  26  /  ALT  24  /  AlkPhos  89  12-03  A1C with Estimated Average Glucose Result: 5.4 % (11-01-24 @ 09:50)

## 2024-12-03 NOTE — DIETITIAN INITIAL EVALUATION ADULT - ORAL INTAKE PTA/DIET HISTORY
Patient Does Not Follow Diet @Home  Consumes 3 Meals a Day   Orders Takeout/Delivery Regularly  Does Take Vitamin/Supplements @Home (Multivitamin)

## 2024-12-03 NOTE — CONSULT NOTE ADULT - SUBJECTIVE AND OBJECTIVE BOX
Patient is a 79y old  Female who presents with a chief complaint of Severe kyphoscoliosis of the thoracolumbar spine with trunk shift and severe stenosis at L4-5 s/p surgery (02 Dec 2024 13:13)        HPI:  Mrs. Ct Levine 78 y/o F w/ HTN, HLD, Afib atrial flutter, sick sinus syndrome/heart block, atrial flutter s/p cardiac ablation x1 ( 2016), PPM dependent dual chamber status (2013), breast augmentation, and chronic lower back pain who presented to [facility] on [facility]/20 w/c/o non radiating lower back pain, BLE weakness (R>L) and difficulty ambulating. Surgical management was recommended and she is s/p T11-pelvis instrumentation and fusion; L4-L5, L5-S1 TLIFs, T12-L1, L1-L2, L2-L3, L3-L4 posterior column osteotomies for coronal scoliosis deformity correction with Dr. Allen on 11/20. Post op course c/b post op anemia requiring multiple pRBC, airway edema s/p extubation, treated w/ steroids, dysphagia, persistent R sided weakness/BLE w/paresthesias post op (XR stable, MRI stable posterior fusion, no spinal cord compression; self limiting). Patient was evaluated by PM&R and therapy for functional deficits, gait/ADL impairments and acute rehabilitation was recommended. Patient was cleared for discharge to [facility] IRF on 12/2/24. (02 Dec 2024 13:13)      PAST MEDICAL & SURGICAL HISTORY:  HTN (hypertension)      Afib      H/O atrial flutter      Chronic back pain      H/O Hashimoto thyroiditis      H/O sick sinus syndrome      H/O heart block      Status cardiac pacemaker      H/O breast augmentation      H/O cardiac radiofrequency ablation          Substance Use (street drugs): ( X ) never used  (  ) other:  Tobacco Usage:  ( X  ) never smoked   (   ) former smoker   (   ) current smoker  (     ) pack year  Alcohol Usage: occasional glass of wine        Allergies    No Known Allergies    Intolerances        enoxaparin Injectable 40 milliGRAM(s) SubCutaneous <User Schedule>      REVIEW OF SYSTEMS:  Constitutional: No fever, No Chills, No fatigue  HEENT: No eye pain, No visual disturbances, No difficulty hearing  Pulm: No cough,  No shortness of breath  Cardio: No chest pain, No palpitations  GI:  No abdominal pain, No nausea, No vomiting, No diarrhea, No constipation, LBM 12/2  : No dysuria, No frequency, No hematuria  Neuro: No headaches, No memory loss, +BLE weakness, + numbness/tingling BL feet, No tremors, no light headed/dizziness   Skin: No itching, No rashes, No lesions   Endo: No temperature intolerance  MSK: +R thigh pain, + R ankle pain, No Neck pain,  No back pain, + BL calf pain   Psych:  No depression, No anxiety    T(C): 36.6 (12-03-24 @ 09:10), Max: 37.1 (12-02-24 @ 16:22)  HR: 62 (12-03-24 @ 09:10) (60 - 80)  BP: 128/61 (12-03-24 @ 09:10) (123/78 - 145/75)  RR: 18 (12-03-24 @ 09:10) (18 - 18)  SpO2: 92% (12-03-24 @ 09:10) (92% - 99%)  Wt(kg): --Vital Signs Last 24 Hrs  T(C): 36.6 (03 Dec 2024 09:10), Max: 37.1 (02 Dec 2024 16:22)  T(F): 97.8 (03 Dec 2024 09:10), Max: 98.8 (02 Dec 2024 16:22)  HR: 62 (03 Dec 2024 09:10) (60 - 80)  BP: 128/61 (03 Dec 2024 09:10) (123/78 - 145/75)  BP(mean): --  RR: 18 (03 Dec 2024 09:10) (18 - 18)  SpO2: 92% (03 Dec 2024 09:10) (92% - 99%)    Parameters below as of 03 Dec 2024 09:10  Patient On (Oxygen Delivery Method): room air        PHYSICAL EXAM:  GENERAL: NAD, well-groomed, well-developed  HEAD:  Atraumatic, Normocephalic  EYES: EOMI, conjunctiva and sclera clear  ENMT:  Moist mucous membranes  NECK: Supple, No JVD  NERVOUS SYSTEM:  Alert & Oriented X3, Good concentration  CHEST/LUNG: Clear to percussion bilaterally; No rales, rhonchi, wheezing  HEART: Regular rate and rhythm  ABDOMEN: Soft, Nontender, Nondistended; Bowel sounds present  EXTREMITIES:  No clubbing, cyanosis, or edema      LABS:                        11.3   8.96  )-----------( 357      ( 03 Dec 2024 05:33 )             33.6     12-03    133[L]  |  96  |  16  ----------------------------<  123[H]  3.8   |  32[H]  |  0.71    Ca    8.9      03 Dec 2024 05:33    TPro  6.5  /  Alb  2.5[L]  /  TBili  0.6  /  DBili  x   /  AST  26  /  ALT  24  /  AlkPhos  89  12-03      Urinalysis Basic - ( 03 Dec 2024 05:33 )    Color: x / Appearance: x / SG: x / pH: x  Gluc: 123 mg/dL / Ketone: x  / Bili: x / Urobili: x   Blood: x / Protein: x / Nitrite: x   Leuk Esterase: x / RBC: x / WBC x   Sq Epi: x / Non Sq Epi: x / Bacteria: x       CAPILLARY BLOOD GLUCOSE            Urinalysis Basic - ( 03 Dec 2024 05:33 )    Color: x / Appearance: x / SG: x / pH: x  Gluc: 123 mg/dL / Ketone: x  / Bili: x / Urobili: x   Blood: x / Protein: x / Nitrite: x   Leuk Esterase: x / RBC: x / WBC x   Sq Epi: x / Non Sq Epi: x / Bacteria: x        RADIOLOGY & ADDITIONAL TESTS:    < from: MR Thoracic/Lumbar Spine No Cont (11.25.24 @ 12:27) >    IMPRESSION:    MRI THORACIC SPINE:  Redemonstration of posterior fusion of T11-S1, bilateral sacroiliac   fusion, and multilevel facetectomies.    No gross evidence for abnormal intrinsic cord signal.    No spinal cord compression.    No significant spinal canal stenosis in the thoracic region. Mild   multilevel neural foraminal narrowing lower thoracic region.    Expected postoperative edema throughout the posterior paraspinal soft   tissues from T9 through the inferior extent of the examination.    MRI LUMBAR SPINE:  Redemonstration of posterior fusion of T11-S1, bilateral sacroiliac   fusion, and multilevel facetectomies.    Mild multilevel mild spinal canal stenosis and neural foraminal narrowing   secondary to multilevel degenerative changes characterized by   degenerative disc disease and facet/ligamentous hypertrophy.    Expected postoperative edema throughout the posterior paraspinal soft   tissues at the levels of surgery.    Consultant(s) Notes Reviewed:  [x ] YES  [ ] NO  Care Discussed with Consultants/Other Providers [ x] YES  [ ] NO  Imaging Personally Reviewed:  [ ] YES  [ ] NO

## 2024-12-03 NOTE — CONSULT NOTE ADULT - REASON FOR ADMISSION
Severe kyphoscoliosis of the thoracolumbar spine with trunk shift and severe stenosis at L4-5 s/p surgery

## 2024-12-03 NOTE — DIETITIAN INITIAL EVALUATION ADULT - ADD RECOMMEND
1) Monitor Weights, Intake, Tolerance, Skin, POCT & Labwork  2) Recommend Initiate Ensure Plus High Protein 8oz PO Daily  3) Education Provided on Proper Nutrition  4) Continue Nutrition Plan of Care

## 2024-12-03 NOTE — CONSULT NOTE ADULT - ASSESSMENT
80 y/o F w/ HTN, HLD, Afib atrial flutter, sick sinus syndrome/heart block, atrial flutter s/p cardiac ablation x1 ( 2016), PPM dependent dual chamber status (2013), breast augmentation, and chronic lower back pain who presented to [facility] on 11/20 w/c/o non radiating lower back pain, BLE weakness (R>L) and difficulty ambulating. Surgical management was recommended and she is s/p T11-pelvis instrumentation and fusion; L4-L5, L5-S1 TLIFs, T12-L1, L1-L2, L2-L3, L3-L4 posterior column osteotomies for coronal scoliosis deformity correction with Dr. Allen on 11/20. Post op course c/b post op anemia requiring multiple pRBC, airway edema s/p extubation, treated w/ steroids, dysphagia, persistent R sided weakness/BLE w/paresthesias post op (XR stable, MRI stable posterior fusion, no spinal cord compression; self limiting). Patient was evaluated by PM&R and therapy for functional deficits, gait/ADL impairments and acute rehabilitation was recommended. Patient was cleared for discharge to [facility] IRF on 12/2/24.    #Severe kyphoscoliosis of the thoracolumbar spine with trunk shift and severe stenosis at L4-5 s/p surgery  - S/P instrumentation and fusion surgery by Dr. Cox (11/20/2024)  - Pain management per primary team  - Comprehensive Multidisciplinary Rehab Program:    #PAF  #PPM  - PPM [employer], interrogated on 11/25 for MRI - shows measured data WNL, normal pacemaker function, NOT dependent.  - 11/22 TTE: LV systolic fxn normal w/ EF 64%, normal RV systolic fxn, mild to modr TR  - Metoprolol 50mg BID  - Xarelto 20mg daily to resume on 12/9 --> Last dose Lovenox on 12/8    #Post op anemia (11/22)   - S/P 5U rRBC- lowest hgb 6.3 - 11/22  - S/P 3U FFP   - Monitor H/H  - Transfuse Hgb <7 PRN    #RUE ecchymosis  #Radial artery occulusion RUE   - 11/22 LED negative  - 11/23 RUE Art Duplex: Right radial artery demonstrates diminished flow velocity at the mid forearm and is occluded at distal forearm. There is retrograde right radial artery flow at the wrist. Surrounding soft tissue edema is noted.  Antegrade flow is noted of the right ulnar artery. Patient's right arm is bruised however is warm, skin is pink, 1+pulse with good capillary refills, discussed results with patient.   - 11/23 and 11/25 RUE/LUE Dopp: negative for upper ext DVT.   - Monitor for s/s of ischemia     #HTN/HLD  - Nounufimwdj31.5mg/IQHG51sg daily  - monitor for OH  - Rosuvastatin 10mg HS      #GI ppx: Pantoprazole 40mg daily       #DVT ppx: Lovenox 40mg daily until 12/8, Xarelto 20mg daily starting 12/9, SCDs

## 2024-12-03 NOTE — DIETITIAN INITIAL EVALUATION ADULT - PERTINENT MEDS FT
MEDICATIONS  (STANDING):  bisacodyl Suppository 10 milliGRAM(s) Rectal daily  enoxaparin Injectable 40 milliGRAM(s) SubCutaneous <User Schedule>  gabapentin 400 milliGRAM(s) Oral <User Schedule>  gabapentin 600 milliGRAM(s) Oral at bedtime  metoprolol tartrate 50 milliGRAM(s) Oral two times a day  pantoprazole    Tablet 40 milliGRAM(s) Oral before breakfast  polyethylene glycol 3350 17 Gram(s) Oral two times a day  rosuvastatin 10 milliGRAM(s) Oral at bedtime  senna 2 Tablet(s) Oral at bedtime  triamterene 37.5 mG/hydrochlorothiazide 25 mG Tablet 1 Tablet(s) Oral daily    MEDICATIONS  (PRN):  acetaminophen     Tablet .. 650 milliGRAM(s) Oral every 6 hours PRN Temp greater or equal to 38C (100.4F), Mild Pain (1 - 3)  methocarbamol 500 milliGRAM(s) Oral every 8 hours PRN Muscle Spasm  traMADol 50 milliGRAM(s) Oral every 6 hours PRN Moderate Pain (4 - 6)

## 2024-12-03 NOTE — DIETITIAN INITIAL EVALUATION ADULT - NS FNS DIET ORDER
Regular Diet (IDDSI Level 7) w/ Thin Liquids (IDDSI Level 0)   Recommend Initiate Ensure Plus High Protein 8oz PO Daily  Education Provided on Proper Nutrition

## 2024-12-04 PROCEDURE — 99232 SBSQ HOSP IP/OBS MODERATE 35: CPT

## 2024-12-04 RX ADMIN — METOPROLOL TARTRATE 50 MILLIGRAM(S): 100 TABLET, FILM COATED ORAL at 05:31

## 2024-12-04 RX ADMIN — GABAPENTIN 400 MILLIGRAM(S): 300 CAPSULE ORAL at 10:33

## 2024-12-04 RX ADMIN — TRAMADOL HYDROCHLORIDE 50 MILLIGRAM(S): 300 CAPSULE ORAL at 09:03

## 2024-12-04 RX ADMIN — Medication 2 TABLET(S): at 21:13

## 2024-12-04 RX ADMIN — ROSUVASTATIN CALCIUM 10 MILLIGRAM(S): 5 TABLET, FILM COATED ORAL at 21:12

## 2024-12-04 RX ADMIN — ACETAMINOPHEN 500MG 650 MILLIGRAM(S): 500 TABLET, COATED ORAL at 06:30

## 2024-12-04 RX ADMIN — PANTOPRAZOLE SODIUM 40 MILLIGRAM(S): 40 TABLET, DELAYED RELEASE ORAL at 05:31

## 2024-12-04 RX ADMIN — ACETAMINOPHEN 500MG 650 MILLIGRAM(S): 500 TABLET, COATED ORAL at 05:34

## 2024-12-04 RX ADMIN — ENOXAPARIN SODIUM 40 MILLIGRAM(S): 30 INJECTION SUBCUTANEOUS at 17:13

## 2024-12-04 RX ADMIN — GABAPENTIN 600 MILLIGRAM(S): 300 CAPSULE ORAL at 21:17

## 2024-12-04 RX ADMIN — TRAMADOL HYDROCHLORIDE 50 MILLIGRAM(S): 300 CAPSULE ORAL at 08:09

## 2024-12-04 RX ADMIN — METOPROLOL TARTRATE 50 MILLIGRAM(S): 100 TABLET, FILM COATED ORAL at 17:12

## 2024-12-04 RX ADMIN — TRIAMTERENE AND HYDROCHLOROTHIAZIDE 1 TABLET(S): 25; 37.5 CAPSULE ORAL at 05:31

## 2024-12-04 NOTE — PROGRESS NOTE ADULT - ASSESSMENT
Assessment/Plan:  Mrs. Ct Levine 80 y/o F w/ HTN, HLD, Afib atrial flutter, sick sinus syndrome/heart block, atrial flutter s/p cardiac ablation x1 ( 2016), PPM dependent dual chamber status (2013), breast augmentation, and chronic lower back pain who presented to [facility] on 11/20 w/c/o non radiating lower back pain, BLE weakness (R>L) and difficulty ambulating. Surgical management was recommended and she is s/p T11-pelvis instrumentation and fusion; L4-L5, L5-S1 TLIFs, T12-L1, L1-L2, L2-L3, L3-L4 posterior column osteotomies for coronal scoliosis deformity correction with Dr. Allen on 11/20. Post op course c/b post op anemia requiring multiple pRBC, airway edema s/p extubation, treated w/ steroids, dysphagia, persistent R sided weakness/BLE w/paresthesias post op (XR stable, MRI stable posterior fusion, no spinal cord compression; self limiting). Patient was evaluated by PM&R and therapy for functional deficits, gait/ADL impairments and acute rehabilitation was recommended. Patient was cleared for discharge to [facility] IRF on 12/2/24.    #Severe kyphoscoliosis of the thoracolumbar spine with trunk shift and severe stenosis at L4-5 s/p surgery  - S/P  instrumentation and fusion surgery by Dr. Cox (11/20/2024)      * T11-pelvis instrumentation and fusion      * L4-L5, L5-S1 TLIFs      * T12-L1, L1-L2, L2-L3, L3-L4 posterior column osteotomies for coronal scoliosis deformity correction      * Pain management: Tylenol PRN, Robaxin 500mg TID PRN, Tramadol 25mg q6h PRN, Gabapentin 400QD/ 600mg HS      * TLSO on ambulation  - Follow-up T/L spine MRI (11/25/24)      * Redemonstration of posterior fusion of T11-S1, bilateral sacroiliac fusion and multilevel facetectomies      * No gross evidence for abnormal intrinsic cord signal. No spinal cord compression, no significant spinal canal stenosis in the thoracic region.       * Expected post-op edema throughout the posterior paraspinal soft tissue at the levels of the surgery  - Deficits: Post op persistent BLE weakness/paresthesias   - Comprehensive Multidisciplinary Rehab Program:      * 3 hours a day, 5 days a week.      * PT 2hr/day: Focused on improving strength, endurance, coordination, balance, functional mobility, and transfers      * OT 1hr/day: Focused on improving strength, fine motor skills, coordination, posture and ADLs.    - Activity Limitations: Decreased social, vocational and leisure activities, decreased self care and ADLs, decreased mobility, decreased ability to manage household and finances.     -----------------------------------------------------------------------------------  Concurrent Medical Problems    #Airway edema s/p extubation  - S/P dexamethasone treatment     #PAF  #PPM  - PPM [employer], interrogated on 11/25 for MRI - shows measured data WNL, normal pacemaker function, NOT dependent.  - 11/22 TTE: LV systolic fxn normal w/ EF 64%, normal RV systolic fxn, mild to modr TR  - Metoprolol 50mg BID  - Xarelto 20mg daily to resume on 12/9 --> Last dose Lovenox on 12/8    #Post op anemia (11/22)   - S/P 5U rRBC- lowest hgb 6.3 - 11/22  - S/P 3U FFP   - Monitor H/H (11.2/34.2 - 12/2)  - Transfuse Hgb <7 PRN    #RUE ecchymosis  #Radial artery occulusion RUE   - 11/22 LED negative  - 11/23 RUE Art Duplex: Right radial artery demonstrates diminished flow velocity at the mid forearm and is occluded at distal forearm. There is retrograde right radial artery flow at the wrist. Surrounding soft tissue edema is noted.   Antegrade flow is noted of the right ulnar artery. Patient's right arm is bruised however is warm, skin is pink, 1+pulse with good capillary refills, discussed results with patient.   - 11/23 and 11/25 RUE/LUE Dopp: negative for upper ext DVT.   - Monitor for s/s of ischemia     #HTN/HLD  - Ppkfxmjiyzj09.5mg/KLQA66pk daily  - Rosuvastatin 10mg HS    #Mood/Cognition:  Neuropsychology consult PRN    #Sleep:   Maintain quiet hours and low stim environment.  Melatonin 6mg HS PRN to maximize participation in therapy during the day.     #GI/Bowel:  Senna QHS, Miralax PRN Daily, Bisacodyl suppository 10mg daily  GI ppx: Pantoprazole 40mg daily     #/Bladder:   - PVR x1 on admission (SC if > 400)    #Skin/Pressure Injury Assessment:   - Skin assessment on admission: ST L groin 4.5x1.5cm, 2.5x0.5cm ST mid upper back, 26cm mid thoracic/lumbar incision, scabbed, CDI w/ 3 drain sites, L gluteal blanchable redness with localized pustules, BLUE ecchymosis (R>L)    #Diet  Current Diet: Regular diet  Nutrition consult     #Precautions / PROPHYLAXIS:   - Falls, Spinal  - ortho: Weight bearing status: WBAT, OOB w/ TLSO brace  - Lungs: Aspiration, Incentive Spirometer   - DVT ppx: Lovenox 40mg daily until 12/8, Xarelto 20mg daily starting 12/9, SCDs  - BLE Duplex US r/o DVT on admission ---> b/l calf pain  - Pressure injury/Skin: OOB to Chair, PT/OT      ---------------  Code Status: FULL  Emergency Contact:    Outpatient Follow-up (Specialty/Name of physician):    Jony Allen  Neurosurgery  [address], [address]  [place] [address]  Phone: [phone]  Fax: [phone]  Follow Up Time:     DREW AMAYA  [address], [address]  [place] [address]      --------------

## 2024-12-04 NOTE — PROGRESS NOTE ADULT - ASSESSMENT
78 y/o F w/ HTN, HLD, Afib atrial flutter, sick sinus syndrome/heart block, atrial flutter s/p cardiac ablation x1 ( 2016), PPM dependent dual chamber status (2013), breast augmentation, and chronic lower back pain who presented to [facility] on 11/20 w/c/o non radiating lower back pain, BLE weakness (R>L) and difficulty ambulating. Surgical management was recommended and she is s/p T11-pelvis instrumentation and fusion; L4-L5, L5-S1 TLIFs, T12-L1, L1-L2, L2-L3, L3-L4 posterior column osteotomies for coronal scoliosis deformity correction with Dr. Allen on 11/20. Post op course c/b post op anemia requiring multiple pRBC, airway edema s/p extubation, treated w/ steroids, dysphagia, persistent R sided weakness/BLE w/paresthesias post op (XR stable, MRI stable posterior fusion, no spinal cord compression; self limiting). Patient was evaluated by PM&R and therapy for functional deficits, gait/ADL impairments and acute rehabilitation was recommended. Patient was cleared for discharge to [facility] IRF on 12/2/24.    #Severe kyphoscoliosis of the thoracolumbar spine with trunk shift and severe stenosis at L4-5 s/p surgery  - S/P instrumentation and fusion surgery by Dr. Cox (11/20/2024)  - Pain management per primary team  - Comprehensive Multidisciplinary Rehab Program:    #PAF  #PPM  - PPM [employer], interrogated on 11/25 for MRI - shows measured data WNL, normal pacemaker function, NOT dependent.  - 11/22 TTE: LV systolic fxn normal w/ EF 64%, normal RV systolic fxn, mild to modr TR  - Metoprolol 50mg BID  - Xarelto 20mg daily to resume on 12/9 --> Last dose Lovenox on 12/8    #Post op anemia (11/22)   - S/P 5U rRBC- lowest hgb 6.3 - 11/22  - S/P 3U FFP   - Monitor H/H  - Transfuse Hgb <7 PRN    #RUE ecchymosis  #Radial artery occulusion RUE   - 11/22 LED negative  - 11/23 RUE Art Duplex: Right radial artery demonstrates diminished flow velocity at the mid forearm and is occluded at distal forearm. There is retrograde right radial artery flow at the wrist. Surrounding soft tissue edema is noted.  Antegrade flow is noted of the right ulnar artery. Patient's right arm is bruised however is warm, skin is pink, 1+pulse with good capillary refills, discussed results with patient.   - 11/23 and 11/25 RUE/LUE Dopp: negative for upper ext DVT.   - Monitor for s/s of ischemia     #HTN/HLD  - Eayhvegganm16.5mg/OHVZ93hm daily  - monitor for OH  - Rosuvastatin 10mg HS      #GI ppx: Pantoprazole 40mg daily     #DVT ppx: Lovenox 40mg daily until 12/8, Xarelto 20mg daily starting 12/9, SCDs

## 2024-12-04 NOTE — PROGRESS NOTE ADULT - SUBJECTIVE AND OBJECTIVE BOX
PROGRESS NOTE:     Patient is a 79y old  Female who presents with a chief complaint of Scoliosis     (03 Dec 2024 11:51)          SUBJECTIVE & OBJECTIVE:   Pt seen and examined at bedside in AM    no overnight events.       REVIEW OF SYSTEMS: remaining ROS negative     PHYSICAL EXAM:  VITALS:  Vital Signs Last 24 Hrs  T(C): 36.6 (04 Dec 2024 07:28), Max: 36.6 (03 Dec 2024 19:51)  T(F): 97.9 (04 Dec 2024 07:28), Max: 97.9 (03 Dec 2024 19:51)  HR: 60 (04 Dec 2024 07:28) (60 - 86)  BP: 132/72 (04 Dec 2024 07:28) (125/74 - 145/59)  BP(mean): --  RR: 16 (04 Dec 2024 07:28) (16 - 16)  SpO2: 95% (04 Dec 2024 07:28) (95% - 97%)    Parameters below as of 04 Dec 2024 07:28  Patient On (Oxygen Delivery Method): room air          GENERAL: NAD,  no increased WOB  HEAD:  Atraumatic, Normocephalic  EYES: EOMI, conjunctiva and sclera clear  ENMT: Moist mucous membranes  NECK: Supple, No JVD  NERVOUS SYSTEM:  Alert & Oriented    CHEST/LUNG: Clear to auscultation bilaterally; No rales, rhonchi, wheezing  HEART: Regular rate and rhythm  ABDOMEN: Soft, Nontender, Nondistended; Bowel sounds present  EXTREMITIES: No clubbing, cyanosis, calf tenderness or edema b/l      MEDICATIONS  (STANDING):  bisacodyl Suppository 10 milliGRAM(s) Rectal daily  enoxaparin Injectable 40 milliGRAM(s) SubCutaneous <User Schedule>  gabapentin 400 milliGRAM(s) Oral <User Schedule>  gabapentin 600 milliGRAM(s) Oral at bedtime  metoprolol tartrate 50 milliGRAM(s) Oral two times a day  pantoprazole    Tablet 40 milliGRAM(s) Oral before breakfast  polyethylene glycol 3350 17 Gram(s) Oral two times a day  rosuvastatin 10 milliGRAM(s) Oral at bedtime  senna 2 Tablet(s) Oral at bedtime  triamterene 37.5 mG/hydrochlorothiazide 25 mG Tablet 1 Tablet(s) Oral daily    MEDICATIONS  (PRN):  acetaminophen     Tablet .. 650 milliGRAM(s) Oral every 6 hours PRN Temp greater or equal to 38C (100.4F), Mild Pain (1 - 3)  methocarbamol 500 milliGRAM(s) Oral every 8 hours PRN Muscle Spasm  traMADol 50 milliGRAM(s) Oral every 6 hours PRN Moderate Pain (4 - 6)      Allergies    No Known Allergies    Intolerances              LABS:                           11.3   8.96  )-----------( 357      ( 03 Dec 2024 05:33 )             33.6     12-03    133[L]  |  96  |  16  ----------------------------<  123[H]  3.8   |  32[H]  |  0.71    Ca    8.9      03 Dec 2024 05:33    TPro  6.5  /  Alb  2.5[L]  /  TBili  0.6  /  DBili  x   /  AST  26  /  ALT  24  /  AlkPhos  89  12-03      Urinalysis Basic - ( 03 Dec 2024 05:33 )    Color: x / Appearance: x / SG: x / pH: x  Gluc: 123 mg/dL / Ketone: x  / Bili: x / Urobili: x   Blood: x / Protein: x / Nitrite: x   Leuk Esterase: x / RBC: x / WBC x   Sq Epi: x / Non Sq Epi: x / Bacteria: x      CAPILLARY BLOOD GLUCOSE                    RECENT CULTURES:          RADIOLOGY & ADDITIONAL TESTS:

## 2024-12-04 NOTE — PROGRESS NOTE ADULT - SUBJECTIVE AND OBJECTIVE BOX
HPI:  Mrs. Ct Levine 78 y/o F w/ HTN, HLD, Afib atrial flutter, sick sinus syndrome/heart block, atrial flutter s/p cardiac ablation x1 ( 2016), PPM dependent dual chamber status (2013), breast augmentation, and chronic lower back pain who presented to [facility] on 11/20 w/c/o non radiating lower back pain, BLE weakness (R>L) and difficulty ambulating. Surgical management was recommended and she is s/p T11-pelvis instrumentation and fusion; L4-L5, L5-S1 TLIFs, T12-L1, L1-L2, L2-L3, L3-L4 posterior column osteotomies for coronal scoliosis deformity correction with Dr. Allen on 11/20. Post op course c/b post op anemia requiring multiple pRBC, airway edema s/p extubation, treated w/ steroids, dysphagia, persistent R sided weakness/BLE w/paresthesias post op (XR stable, MRI stable posterior fusion, no spinal cord compression; self limiting). Patient was evaluated by PM&R and therapy for functional deficits, gait/ADL impairments and acute rehabilitation was recommended. Patient was cleared for discharge to [facility] IRF on 12/2/24. (02 Dec 2024 13:13)    ___________________________________________________________________________    SUBJECTIVE/ROS  Patient was seen and evaluated at bedside today.  Reported no overnight events and is in no acute distress.  Tolerated admission process and initial evaluations.  Case to be evaluated at Interdisciplinary Team meeting tomorrow.  Eager to participate on the recommended rehabilitation program.  Denies any CP, SOB, REA, palpitations, fever, chills, body aches, cough, congestion, or any other symptoms at this time.   ___________________________________________________________________________    LABS                          11.3   8.96  )-----------( 357      ( 03 Dec 2024 05:33 )             33.6       12-03    133[L]  |  96  |  16  ----------------------------<  123[H]  3.8   |  32[H]  |  0.71    Ca    8.9      03 Dec 2024 05:33    TPro  6.5  /  Alb  2.5[L]  /  TBili  0.6  /  DBili  x   /  AST  26  /  ALT  24  /  AlkPhos  89  12-03    ___________________________________________________________________________    MEDICATIONS  (STANDING):  bisacodyl Suppository 10 milliGRAM(s) Rectal daily  enoxaparin Injectable 40 milliGRAM(s) SubCutaneous <User Schedule>  gabapentin 400 milliGRAM(s) Oral <User Schedule>  gabapentin 600 milliGRAM(s) Oral at bedtime  metoprolol tartrate 50 milliGRAM(s) Oral two times a day  pantoprazole    Tablet 40 milliGRAM(s) Oral before breakfast  polyethylene glycol 3350 17 Gram(s) Oral two times a day  rosuvastatin 10 milliGRAM(s) Oral at bedtime  senna 2 Tablet(s) Oral at bedtime  triamterene 37.5 mG/hydrochlorothiazide 25 mG Tablet 1 Tablet(s) Oral daily    MEDICATIONS  (PRN):  acetaminophen     Tablet .. 650 milliGRAM(s) Oral every 6 hours PRN Temp greater or equal to 38C (100.4F), Mild Pain (1 - 3)  methocarbamol 500 milliGRAM(s) Oral every 8 hours PRN Muscle Spasm  traMADol 50 milliGRAM(s) Oral every 6 hours PRN Moderate Pain (4 - 6)    ___________________________________________________________________________    PHYSICAL EXAM:  Vital Signs Last 24 Hrs  T(C): 36.6 (04 Dec 2024 07:28), Max: 36.6 (03 Dec 2024 19:51)  T(F): 97.9 (04 Dec 2024 07:28), Max: 97.9 (03 Dec 2024 19:51)  HR: 60 (04 Dec 2024 07:28) (60 - 86)  BP: 132/72 (04 Dec 2024 07:28) (125/74 - 145/59)  RR: 16 (04 Dec 2024 07:28) (16 - 16)  SpO2: 95% (04 Dec 2024 07:28) (95% - 97%)    Gen - NAD, Comfortable  HEENT - NCAT, EOMI, MMM, PERRLA, Normal Conjunctivae  Pulm - CTAB, No wheeze, No rhonchi, No crackles  Cardiovascular - RRR, S1S2, No murmurs  Chest - good chest expansion, good respiratory effort  Abdomen - Soft, NT/ND, +BS  Extremities - No C/C, + B/L calf tenderness, + B/L ankle and pedal edema   Neuro-     Cognitive - awake, alert, oriented to person, place, date, year, and situation.  Able  to follow command     Motor -                     LEFT    UE - ShAB 5/5, EF 5/5, EE 5/5,  5/5                    RIGHT UE - ShAB 5/5, EF 5/5, EE 5/5,   5/5                    LEFT    LE - HF 2/5, KE 2/5, DF 4/5, PF 5/5                    RIGHT LE - HF 1/5, KE 2/5, DF 2/5, PF 5/5        Sensory - Intact  to LT      Coordination - FTN Intact, Unable to HTS 2/2 BLE weakness     Tone - Normal  MSK: Soreness across R side abd, R anterior thigh, R ankle   Psychiatric - Mood stable, Affect WNL  Skin:  ST L groin 4.5x1.5cm, 2.5x0.5cm ST mid upper back, 26cm mid thoracic/lumbar incision, scabbed, CDI w/ 3 drain sites, L gluteal blanchable redness with localized pustules, BLUE ecchymosis (R>L).      ___________________________________________________________________________

## 2024-12-05 LAB
ALBUMIN SERPL ELPH-MCNC: 2.7 G/DL — LOW (ref 3.3–5)
ALP SERPL-CCNC: 108 U/L — SIGNIFICANT CHANGE UP (ref 40–120)
ALT FLD-CCNC: 19 U/L — SIGNIFICANT CHANGE UP (ref 10–45)
ANION GAP SERPL CALC-SCNC: 5 MMOL/L — SIGNIFICANT CHANGE UP (ref 5–17)
AST SERPL-CCNC: 19 U/L — SIGNIFICANT CHANGE UP (ref 10–40)
BASOPHILS # BLD AUTO: 0.03 K/UL — SIGNIFICANT CHANGE UP (ref 0–0.2)
BASOPHILS NFR BLD AUTO: 0.4 % — SIGNIFICANT CHANGE UP (ref 0–2)
BILIRUB SERPL-MCNC: 0.6 MG/DL — SIGNIFICANT CHANGE UP (ref 0.2–1.2)
BUN SERPL-MCNC: 13 MG/DL — SIGNIFICANT CHANGE UP (ref 7–23)
CALCIUM SERPL-MCNC: 9.1 MG/DL — SIGNIFICANT CHANGE UP (ref 8.4–10.5)
CHLORIDE SERPL-SCNC: 97 MMOL/L — SIGNIFICANT CHANGE UP (ref 96–108)
CO2 SERPL-SCNC: 29 MMOL/L — SIGNIFICANT CHANGE UP (ref 22–31)
CREAT SERPL-MCNC: 0.61 MG/DL — SIGNIFICANT CHANGE UP (ref 0.5–1.3)
EGFR: 91 ML/MIN/1.73M2 — SIGNIFICANT CHANGE UP
EOSINOPHIL # BLD AUTO: 0.11 K/UL — SIGNIFICANT CHANGE UP (ref 0–0.5)
EOSINOPHIL NFR BLD AUTO: 1.5 % — SIGNIFICANT CHANGE UP (ref 0–6)
GLUCOSE SERPL-MCNC: 118 MG/DL — HIGH (ref 70–99)
HCT VFR BLD CALC: 35.5 % — SIGNIFICANT CHANGE UP (ref 34.5–45)
HGB BLD-MCNC: 11.8 G/DL — SIGNIFICANT CHANGE UP (ref 11.5–15.5)
IMM GRANULOCYTES NFR BLD AUTO: 0.6 % — SIGNIFICANT CHANGE UP (ref 0–0.9)
LYMPHOCYTES # BLD AUTO: 0.97 K/UL — LOW (ref 1–3.3)
LYMPHOCYTES # BLD AUTO: 13.5 % — SIGNIFICANT CHANGE UP (ref 13–44)
MCHC RBC-ENTMCNC: 29.3 PG — SIGNIFICANT CHANGE UP (ref 27–34)
MCHC RBC-ENTMCNC: 33.2 G/DL — SIGNIFICANT CHANGE UP (ref 32–36)
MCV RBC AUTO: 88.1 FL — SIGNIFICANT CHANGE UP (ref 80–100)
MONOCYTES # BLD AUTO: 0.74 K/UL — SIGNIFICANT CHANGE UP (ref 0–0.9)
MONOCYTES NFR BLD AUTO: 10.3 % — SIGNIFICANT CHANGE UP (ref 2–14)
NEUTROPHILS # BLD AUTO: 5.3 K/UL — SIGNIFICANT CHANGE UP (ref 1.8–7.4)
NEUTROPHILS NFR BLD AUTO: 73.7 % — SIGNIFICANT CHANGE UP (ref 43–77)
NRBC # BLD: 0 /100 WBCS — SIGNIFICANT CHANGE UP (ref 0–0)
PLATELET # BLD AUTO: 425 K/UL — HIGH (ref 150–400)
POTASSIUM SERPL-MCNC: 3.8 MMOL/L — SIGNIFICANT CHANGE UP (ref 3.5–5.3)
POTASSIUM SERPL-SCNC: 3.8 MMOL/L — SIGNIFICANT CHANGE UP (ref 3.5–5.3)
PROT SERPL-MCNC: 6.9 G/DL — SIGNIFICANT CHANGE UP (ref 6–8.3)
RBC # BLD: 4.03 M/UL — SIGNIFICANT CHANGE UP (ref 3.8–5.2)
RBC # FLD: 16 % — HIGH (ref 10.3–14.5)
SODIUM SERPL-SCNC: 131 MMOL/L — LOW (ref 135–145)
WBC # BLD: 7.19 K/UL — SIGNIFICANT CHANGE UP (ref 3.8–10.5)
WBC # FLD AUTO: 7.19 K/UL — SIGNIFICANT CHANGE UP (ref 3.8–10.5)

## 2024-12-05 PROCEDURE — 99232 SBSQ HOSP IP/OBS MODERATE 35: CPT

## 2024-12-05 RX ORDER — GABAPENTIN 300 MG/1
600 CAPSULE ORAL EVERY 8 HOURS
Refills: 0 | Status: DISCONTINUED | OUTPATIENT
Start: 2024-12-05 | End: 2024-12-09

## 2024-12-05 RX ADMIN — TRAMADOL HYDROCHLORIDE 50 MILLIGRAM(S): 300 CAPSULE ORAL at 03:29

## 2024-12-05 RX ADMIN — ROSUVASTATIN CALCIUM 10 MILLIGRAM(S): 5 TABLET, FILM COATED ORAL at 21:50

## 2024-12-05 RX ADMIN — METOPROLOL TARTRATE 50 MILLIGRAM(S): 100 TABLET, FILM COATED ORAL at 05:25

## 2024-12-05 RX ADMIN — ENOXAPARIN SODIUM 40 MILLIGRAM(S): 30 INJECTION SUBCUTANEOUS at 18:04

## 2024-12-05 RX ADMIN — Medication 2 TABLET(S): at 21:50

## 2024-12-05 RX ADMIN — GABAPENTIN 600 MILLIGRAM(S): 300 CAPSULE ORAL at 21:52

## 2024-12-05 RX ADMIN — TRAMADOL HYDROCHLORIDE 50 MILLIGRAM(S): 300 CAPSULE ORAL at 12:33

## 2024-12-05 RX ADMIN — METOPROLOL TARTRATE 50 MILLIGRAM(S): 100 TABLET, FILM COATED ORAL at 18:04

## 2024-12-05 RX ADMIN — TRIAMTERENE AND HYDROCHLOROTHIAZIDE 1 TABLET(S): 25; 37.5 CAPSULE ORAL at 05:28

## 2024-12-05 RX ADMIN — ACETAMINOPHEN 500MG 650 MILLIGRAM(S): 500 TABLET, COATED ORAL at 01:42

## 2024-12-05 RX ADMIN — PANTOPRAZOLE SODIUM 40 MILLIGRAM(S): 40 TABLET, DELAYED RELEASE ORAL at 05:25

## 2024-12-05 RX ADMIN — TRAMADOL HYDROCHLORIDE 50 MILLIGRAM(S): 300 CAPSULE ORAL at 09:36

## 2024-12-05 RX ADMIN — POLYETHYLENE GLYCOL 3350 17 GRAM(S): 17 POWDER, FOR SOLUTION ORAL at 18:04

## 2024-12-05 RX ADMIN — GABAPENTIN 600 MILLIGRAM(S): 300 CAPSULE ORAL at 14:55

## 2024-12-05 RX ADMIN — TRAMADOL HYDROCHLORIDE 50 MILLIGRAM(S): 300 CAPSULE ORAL at 04:00

## 2024-12-05 NOTE — PROGRESS NOTE ADULT - ASSESSMENT
Assessment/Plan:  Mrs. Ct Levine 78 y/o F w/ HTN, HLD, Afib atrial flutter, sick sinus syndrome/heart block, atrial flutter s/p cardiac ablation x1 ( 2016), PPM dependent dual chamber status (2013), breast augmentation, and chronic lower back pain who presented to [facility] on 11/20 w/c/o non radiating lower back pain, BLE weakness (R>L) and difficulty ambulating. Surgical management was recommended and she is s/p T11-pelvis instrumentation and fusion; L4-L5, L5-S1 TLIFs, T12-L1, L1-L2, L2-L3, L3-L4 posterior column osteotomies for coronal scoliosis deformity correction with Dr. Allen on 11/20. Post op course c/b post op anemia requiring multiple pRBC, airway edema s/p extubation, treated w/ steroids, dysphagia, persistent R sided weakness/BLE w/paresthesias post op (XR stable, MRI stable posterior fusion, no spinal cord compression; self limiting). Patient was evaluated by PM&R and therapy for functional deficits, gait/ADL impairments and acute rehabilitation was recommended. Patient was cleared for discharge to [facility] IRF on 12/2/24.    #Severe kyphoscoliosis of the thoracolumbar spine with trunk shift and severe stenosis at L4-5 s/p surgery  - S/P  instrumentation and fusion surgery by Dr. Cox (11/20/2024)      * T11-pelvis instrumentation and fusion      * L4-L5, L5-S1 TLIFs      * T12-L1, L1-L2, L2-L3, L3-L4 posterior column osteotomies for coronal scoliosis deformity correction      * Pain management: Tylenol PRN, Robaxin 500mg TID PRN, Tramadol 25mg q6h PRN, Gabapentin 400QD/ 600mg HS      * TLSO on ambulation  - Follow-up T/L spine MRI (11/25/24)      * Redemonstration of posterior fusion of T11-S1, bilateral sacroiliac fusion and multilevel facetectomies      * No gross evidence for abnormal intrinsic cord signal. No spinal cord compression, no significant spinal canal stenosis in the thoracic region.       * Expected post-op edema throughout the posterior paraspinal soft tissue at the levels of the surgery  - Deficits: Post op persistent BLE weakness/paresthesias   - Comprehensive Multidisciplinary Rehab Program:      * 3 hours a day, 5 days a week.      * PT 2hr/day: Focused on improving strength, endurance, coordination, balance, functional mobility, and transfers      * OT 1hr/day: Focused on improving strength, fine motor skills, coordination, posture and ADLs.    - Activity Limitations: Decreased social, vocational and leisure activities, decreased self care and ADLs, decreased mobility, decreased ability to manage household and finances.     -----------------------------------------------------------------------------------  Concurrent Medical Problems    #Airway edema s/p extubation  - S/P dexamethasone treatment     #PAF  #PPM  - PPM [employer], interrogated on 11/25 for MRI - shows measured data WNL, normal pacemaker function, NOT dependent.  - 11/22 TTE: LV systolic fxn normal w/ EF 64%, normal RV systolic fxn, mild to modr TR  - Metoprolol 50mg BID  - Xarelto 20mg daily to resume on 12/9 --> Last dose Lovenox on 12/8    #Post op anemia (11/22)   - S/P 5U rRBC- lowest hgb 6.3 - 11/22  - S/P 3U FFP   - Monitor H/H (11.2/34.2 - 12/2)  - Transfuse Hgb <7 PRN    #RUE ecchymosis  #Radial artery occulusion RUE   - 11/22 LED negative  - 11/23 RUE Art Duplex: Right radial artery demonstrates diminished flow velocity at the mid forearm and is occluded at distal forearm. There is retrograde right radial artery flow at the wrist. Surrounding soft tissue edema is noted.   Antegrade flow is noted of the right ulnar artery. Patient's right arm is bruised however is warm, skin is pink, 1+pulse with good capillary refills, discussed results with patient.   - 11/23 and 11/25 RUE/LUE Dopp: negative for upper ext DVT.   - Monitor for s/s of ischemia     #HTN/HLD  - Zbpwnzsfzbt01.5mg/PKOQ60te daily  - Rosuvastatin 10mg HS    #Mood/Cognition:  Neuropsychology consult PRN    #Sleep:   Maintain quiet hours and low stim environment.  Melatonin 6mg HS PRN to maximize participation in therapy during the day.     #GI/Bowel:  Senna QHS, Miralax PRN Daily, Bisacodyl suppository 10mg daily  GI ppx: Pantoprazole 40mg daily     #/Bladder:   - PVR x1 on admission (SC if > 400)    #Skin/Pressure Injury Assessment:   - Skin assessment on admission: ST L groin 4.5x1.5cm, 2.5x0.5cm ST mid upper back, 26cm mid thoracic/lumbar incision, scabbed, CDI w/ 3 drain sites, L gluteal blanchable redness with localized pustules, BLUE ecchymosis (R>L)    #Diet  Current Diet: Regular diet  Nutrition consult     #Precautions / PROPHYLAXIS:   - Falls, Spinal  - ortho: Weight bearing status: WBAT, OOB w/ TLSO brace  - Lungs: Aspiration, Incentive Spirometer   - DVT ppx: Lovenox 40mg daily until 12/8, Xarelto 20mg daily starting 12/9, SCDs  - BLE Duplex US r/o DVT on admission ---> b/l calf pain  - Pressure injury/Skin: OOB to Chair, PT/OT      ---------------  Code Status: FULL  Emergency Contact:    Outpatient Follow-up (Specialty/Name of physician):    Jony Allen  Neurosurgery  [address], [address]  [place] [address]  Phone: [phone]  Fax: [phone]  Follow Up Time:     DREW AMAYA  [address], [address]  [place] [address]      --------------

## 2024-12-05 NOTE — PROGRESS NOTE ADULT - SUBJECTIVE AND OBJECTIVE BOX
HPI:  Mrs. Ct Levine 80 y/o F w/ HTN, HLD, Afib atrial flutter, sick sinus syndrome/heart block, atrial flutter s/p cardiac ablation x1 ( 2016), PPM dependent dual chamber status (2013), breast augmentation, and chronic lower back pain who presented to [facility] on 11/20 w/c/o non radiating lower back pain, BLE weakness (R>L) and difficulty ambulating. Surgical management was recommended and she is s/p T11-pelvis instrumentation and fusion; L4-L5, L5-S1 TLIFs, T12-L1, L1-L2, L2-L3, L3-L4 posterior column osteotomies for coronal scoliosis deformity correction with Dr. Allen on 11/20. Post op course c/b post op anemia requiring multiple pRBC, airway edema s/p extubation, treated w/ steroids, dysphagia, persistent R sided weakness/BLE w/paresthesias post op (XR stable, MRI stable posterior fusion, no spinal cord compression; self limiting). Patient was evaluated by PM&R and therapy for functional deficits, gait/ADL impairments and acute rehabilitation was recommended. Patient was cleared for discharge to [facility] IRF on 12/2/24. (02 Dec 2024 13:13)    TDD: 12/18/24 to Home  ___________________________________________________________________________    SUBJECTIVE/ROS  Patient was seen and evaluated at bedside today.  Reported no overnight events and is in no acute distress.  Neuropathic pain in dermatomal distribution. Gabapentin increased to 600mg q8h.  Case was discussed at Interdisciplinary Team meeting today.  A tentative discharge date is outlined above.  Patient is eager to continue participation on the recommended rehabilitation program.  Denies any CP, SOB, REA, palpitations, fever, chills, body aches, cough, congestion, or any other symptoms at this time.   ___________________________________________________________________________    LAB                        11.8   7.19  )-----------( 425      ( 05 Dec 2024 08:45 )             35.5     12-05    131[L]  |  97  |  13  ----------------------------<  118[H]  3.8   |  29  |  0.61    Ca    9.1      05 Dec 2024 08:45    TPro  6.9  /  Alb  2.7[L]  /  TBili  0.6  /  DBili  x   /  AST  19  /  ALT  19  /  AlkPhos  108  12-05    LIVER FUNCTIONS - ( 05 Dec 2024 08:45 )  Alb: 2.7 g/dL / Pro: 6.9 g/dL / ALK PHOS: 108 U/L / ALT: 19 U/L / AST: 19 U/L / GGT: x             12-03    133[L]  |  96  |  16  ----------------------------<  123[H]  3.8   |  32[H]  |  0.71    Ca    8.9      03 Dec 2024 05:33    TPro  6.5  /  Alb  2.5[L]  /  TBili  0.6  /  DBili  x   /  AST  26  /  ALT  24  /  AlkPhos  89  12-03    ___________________________________________________________________________    MEDICATIONS  (STANDING):  bisacodyl Suppository 10 milliGRAM(s) Rectal daily  enoxaparin Injectable 40 milliGRAM(s) SubCutaneous <User Schedule>  gabapentin 600 milliGRAM(s) Oral every 8 hours  metoprolol tartrate 50 milliGRAM(s) Oral two times a day  pantoprazole    Tablet 40 milliGRAM(s) Oral before breakfast  polyethylene glycol 3350 17 Gram(s) Oral two times a day  rosuvastatin 10 milliGRAM(s) Oral at bedtime  senna 2 Tablet(s) Oral at bedtime  triamterene 37.5 mG/hydrochlorothiazide 25 mG Tablet 1 Tablet(s) Oral daily    MEDICATIONS  (PRN):  acetaminophen     Tablet .. 650 milliGRAM(s) Oral every 6 hours PRN Temp greater or equal to 38C (100.4F), Mild Pain (1 - 3)  methocarbamol 500 milliGRAM(s) Oral every 8 hours PRN Muscle Spasm  traMADol 50 milliGRAM(s) Oral every 6 hours PRN Moderate Pain (4 - 6)    ___________________________________________________________________________    PHYSICAL EXAM:  Vital Signs Last 24 Hrs  T(C): 36.8 (05 Dec 2024 08:18), Max: 37.3 (04 Dec 2024 20:16)  T(F): 98.2 (05 Dec 2024 08:18), Max: 99.1 (04 Dec 2024 20:16)  HR: 62 (05 Dec 2024 08:18) (62 - 76)  BP: 133/72 (05 Dec 2024 08:18) (115/65 - 157/73)  RR: 16 (05 Dec 2024 08:18) (16 - 16)  SpO2: 95% (05 Dec 2024 08:18) (95% - 95%)    Gen - NAD, Comfortable  HEENT - NCAT, EOMI, MMM, PERRLA, Normal Conjunctivae  Pulm - CTAB, No wheeze, No rhonchi, No crackles  Cardiovascular - RRR, S1S2, No murmurs  Chest - good chest expansion, good respiratory effort  Abdomen - Soft, NT/ND, +BS  Extremities - No C/C, + B/L calf tenderness, + B/L ankle and pedal edema   Neuro-     Cognitive - awake, alert, oriented to person, place, date, year, and situation.  Able  to follow command     Motor -                     LEFT    UE - ShAB 5/5, EF 5/5, EE 5/5,  5/5                    RIGHT UE - ShAB 5/5, EF 5/5, EE 5/5,   5/5                    LEFT    LE - HF 2/5, KE 2/5, DF 4/5, PF 5/5                    RIGHT LE - HF 1/5, KE 2/5, DF 2/5, PF 5/5        Sensory - Intact  to LT      Coordination - FTN Intact, Unable to HTS 2/2 BLE weakness     Tone - Normal  MSK: Soreness across R side abd, R anterior thigh, R ankle   Psychiatric - Mood stable, Affect WNL  Skin:  ST L groin 4.5x1.5cm, 2.5x0.5cm ST mid upper back, 26cm mid thoracic/lumbar incision, scabbed, CDI w/ 3 drain sites, L gluteal blanchable redness with localized pustules, BLUE ecchymosis (R>L).      ___________________________________________________________________________

## 2024-12-06 PROCEDURE — 99232 SBSQ HOSP IP/OBS MODERATE 35: CPT

## 2024-12-06 RX ORDER — LIDOCAINE 40 MG/G
1 CREAM TOPICAL EVERY 24 HOURS
Refills: 0 | Status: DISCONTINUED | OUTPATIENT
Start: 2024-12-06 | End: 2024-12-14

## 2024-12-06 RX ADMIN — ENOXAPARIN SODIUM 40 MILLIGRAM(S): 30 INJECTION SUBCUTANEOUS at 17:36

## 2024-12-06 RX ADMIN — GABAPENTIN 600 MILLIGRAM(S): 300 CAPSULE ORAL at 22:44

## 2024-12-06 RX ADMIN — Medication 2 TABLET(S): at 22:44

## 2024-12-06 RX ADMIN — TRIAMTERENE AND HYDROCHLOROTHIAZIDE 1 TABLET(S): 25; 37.5 CAPSULE ORAL at 06:41

## 2024-12-06 RX ADMIN — ROSUVASTATIN CALCIUM 10 MILLIGRAM(S): 5 TABLET, FILM COATED ORAL at 22:44

## 2024-12-06 RX ADMIN — METOPROLOL TARTRATE 50 MILLIGRAM(S): 100 TABLET, FILM COATED ORAL at 17:36

## 2024-12-06 RX ADMIN — GABAPENTIN 600 MILLIGRAM(S): 300 CAPSULE ORAL at 06:43

## 2024-12-06 RX ADMIN — TRAMADOL HYDROCHLORIDE 50 MILLIGRAM(S): 300 CAPSULE ORAL at 09:24

## 2024-12-06 RX ADMIN — METOPROLOL TARTRATE 50 MILLIGRAM(S): 100 TABLET, FILM COATED ORAL at 06:43

## 2024-12-06 RX ADMIN — PANTOPRAZOLE SODIUM 40 MILLIGRAM(S): 40 TABLET, DELAYED RELEASE ORAL at 06:43

## 2024-12-06 RX ADMIN — GABAPENTIN 600 MILLIGRAM(S): 300 CAPSULE ORAL at 14:24

## 2024-12-06 RX ADMIN — LIDOCAINE 1 PATCH: 40 CREAM TOPICAL at 22:43

## 2024-12-06 NOTE — CHART NOTE - NSCHARTNOTEFT_GEN_A_CORE
Nutrition Follow Up Note  Hospital Course   (Per Electronic Medical Record)    Source:  Patient [X]  Medical Record [X]      Diet:   Regular Diet (IDDSI Level 7) w/ Thin Liquids (IDDSI Level 0)  Tolerates Diet Consistency Well  No Chewing/Swallowing Difficulties  No Recent Nausea, Vomiting, Diarrhea or Constipation (as Per Patient)  Consumes % of Meals (as Per Documentation) - Intake Improving (Per Patient)  on Ensure Plus High Protein 8oz PO Daily (Provides 350kcal & 20grams of Protein)  Declines Nutrition Supplementation - Recommend Discontinue (Per Patient Request)  Education Provided on Proper Nutrition   Obtained Food Preferences from Patient    Enteral/Parenteral Nutrition: Not Applicable    Current Weight: 152.5lb on 12/2  Obtain New Weight  Obtain Weights Weekly     Pertinent Medications: MEDICATIONS  (STANDING):  bisacodyl Suppository 10 milliGRAM(s) Rectal daily  enoxaparin Injectable 40 milliGRAM(s) SubCutaneous <User Schedule>  gabapentin 600 milliGRAM(s) Oral every 8 hours  metoprolol tartrate 50 milliGRAM(s) Oral two times a day  pantoprazole    Tablet 40 milliGRAM(s) Oral before breakfast  polyethylene glycol 3350 17 Gram(s) Oral two times a day  rosuvastatin 10 milliGRAM(s) Oral at bedtime  senna 2 Tablet(s) Oral at bedtime  triamterene 37.5 mG/hydrochlorothiazide 25 mG Tablet 1 Tablet(s) Oral daily    MEDICATIONS  (PRN):  acetaminophen     Tablet .. 650 milliGRAM(s) Oral every 6 hours PRN Temp greater or equal to 38C (100.4F), Mild Pain (1 - 3)  methocarbamol 500 milliGRAM(s) Oral every 8 hours PRN Muscle Spasm  traMADol 50 milliGRAM(s) Oral every 6 hours PRN Moderate Pain (4 - 6)    Pertinent Labs:  12-05 Na131 mmol/L[L] Glu 118 mg/dL[H] K+ 3.8 mmol/L Cr  0.61 mg/dL BUN 13 mg/dL 12-05 Alb 2.7 g/dL[L]    Skin: No Pressure Ulcers  Surgical Incision on Back  (as Per Nursing Flow Sheet)     Edema: None Noted (as Per Documentation)     Last Bowel Movement: on 12/5    Estimated Needs:   [X] No Change Since Previous Assessment    Previous Nutrition Diagnosis:   Moderate Malnutrition     Nutrition Diagnosis is [X] Ongoing - Declines Nutrition Supplementation - Recommend Discontinue (Per Patient Request)     New Nutrition Diagnosis: [X] Not Applicable    Interventions:   1. Recommend Discontinue Ensure Plus High Protein 8oz PO Daily  2. Education Provided on Proper Nutrition   3. Recommend Continue Nutrition Plan of Care     Monitoring & Evaluation:   [X] Weights   [X] PO Intake   [X] Skin Integrity   [X] Follow Up (Per Protocol)  [X] Tolerance to Diet Prescription   [X] Other: Labs     Registered Dietitian/Nutritionist Remains Available.  Arcadio Coley, TAYLORN, CDN    Phone# [phone]

## 2024-12-06 NOTE — PROGRESS NOTE ADULT - ASSESSMENT
78 y/o F w/ HTN, HLD, Afib atrial flutter, sick sinus syndrome/heart block, atrial flutter s/p cardiac ablation x1 ( 2016), PPM dependent dual chamber status (2013), breast augmentation, and chronic lower back pain who presented to [facility] on 11/20 w/c/o non radiating lower back pain, BLE weakness (R>L) and difficulty ambulating. Surgical management was recommended and she is s/p T11-pelvis instrumentation and fusion; L4-L5, L5-S1 TLIFs, T12-L1, L1-L2, L2-L3, L3-L4 posterior column osteotomies for coronal scoliosis deformity correction with Dr. Allen on 11/20. Post op course c/b post op anemia requiring multiple pRBC, airway edema s/p extubation, treated w/ steroids, dysphagia, persistent R sided weakness/BLE w/paresthesias post op (XR stable, MRI stable posterior fusion, no spinal cord compression; self limiting). Patient was evaluated by PM&R and therapy for functional deficits, gait/ADL impairments and acute rehabilitation was recommended. Patient was cleared for discharge to [facility] IRF on 12/2/24.        #Severe kyphoscoliosis of the thoracolumbar spine with trunk shift and severe stenosis at L4-5 s/p surgery  - S/P instrumentation and fusion surgery by Dr. Cox (11/20/2024)  - Pain management per primary team  - Comprehensive Multidisciplinary Rehab Program:    #PAF  #PPM  - PPM [employer], interrogated on 11/25 for MRI - shows measured data WNL, normal pacemaker function, NOT dependent.  - 11/22 TTE: LV systolic fxn normal w/ EF 64%, normal RV systolic fxn, mild to modr TR  - Metoprolol 50mg BID  - Xarelto 20mg daily to resume on 12/9 --> Last dose Lovenox on 12/8    #Post op anemia (11/22)   - S/P 5U rRBC- lowest hgb 6.3 - 11/22  - S/P 3U FFP   - Monitor H/H  - Transfuse Hgb <7 PRN    #RUE ecchymosis  #Radial artery occulusion RUE   - 11/22 LED negative  - 11/23 RUE Art Duplex: Right radial artery demonstrates diminished flow velocity at the mid forearm and is occluded at distal forearm. There is retrograde right radial artery flow at the wrist. Surrounding soft tissue edema is noted.  Antegrade flow is noted of the right ulnar artery. Patient's right arm is bruised however is warm, skin is pink, 1+pulse with good capillary refills, discussed results with patient.   - 11/23 and 11/25 RUE/LUE Dopp: negative for upper ext DVT.   - Monitor for s/s of ischemia     #HTN/HLD  - Kpavunuiasq25.5mg/MDAS74tl daily  - monitor for OH  - Rosuvastatin 10mg HS    # Moderate protein-calorie malnutrition.  RD following    #GI ppx: Pantoprazole 40mg daily     #DVT ppx: Lovenox 40mg daily until 12/8, Xarelto 20mg daily starting 12/9, SCDs

## 2024-12-06 NOTE — PROGRESS NOTE ADULT - ASSESSMENT
Assessment/Plan:  78yo woman w/ h/o HTN, HLD, Afib, sick sinus syndrome/heart block, atrial flutter s/p cardiac ablation x1 (2016), PPM dependent dual chamber status (2013), breast augmentation, and chronic lower back pain who presented to [facility] on 11/20 with non radiating lower back pain, BLE weakness (R>L) and difficulty ambulating. Surgical management was recommended and she is s/p T11-pelvis instrumentation and fusion; L4-L5, L5-S1 TLIFs, T12-L1, L1-L2, L2-L3, L3-L4 posterior column osteotomies for coronal scoliosis deformity correction with Dr. Allen on 11/20. Post op course c/b post op anemia requiring multiple pRBC, airway edema s/p extubation, treated w/ steroids, dysphagia, persistent R sided weakness/BLE w/paresthesias post op (XR stable, MRI stable posterior fusion, no spinal cord compression; self limiting). Patient was evaluated by PM&R and therapy for functional deficits, gait/ADL impairments and acute rehabilitation was recommended. Patient was cleared for discharge to [facility] IRF on 12/2/24.    #Severe kyphoscoliosis of the thoracolumbar spine with trunk shift and severe stenosis at L4-5 s/p surgery  - S/P  instrumentation and fusion surgery by Dr. Cox (11/20/2024)      * T11-pelvis instrumentation and fusion      * L4-L5, L5-S1 TLIFs      * T12-L1, L1-L2, L2-L3, L3-L4 posterior column osteotomies for coronal scoliosis deformity correction      * Pain management           - Tylenol 650mg PO q6h PRN for mild pain           - Robaxin 500mg PO TID PRN for muscle spasm           - tramadol 50mg PO q6h PRN for moderate pain           - gabapentin 600mg PO TID           - add lidocaine patch to right thigh 12/6/24      * TLSO on ambulation  - Follow-up T/L spine MRI (11/25/24)      * Re-demonstration of posterior fusion of T11-S1, bilateral sacroiliac fusion and multilevel facetectomies      * No gross evidence for abnormal intrinsic cord signal. No spinal cord compression, no significant spinal canal stenosis in the thoracic region.       * Expected post-op edema throughout the posterior paraspinal soft tissue at the levels of the surgery  - Deficits: Post op persistent BLE weakness/paresthesias   - Comprehensive Multidisciplinary Rehab Program:      * 3 hours a day, 5 days a week      * PT 2hr/day: Focused on improving strength, endurance, coordination, balance, functional mobility, and transfers      * OT 1hr/day: Focused on improving strength, fine motor skills, coordination, posture and ADLs  - Activity Limitations: Decreased social, vocational and leisure activities, decreased self care and ADLs, decreased mobility, decreased ability to manage household and finance    -----------------------------------------------------------------------------------  Concurrent Medical Problems    #Airway edema s/p extubation (resolved)  - s/p dexamethasone treatment     #PAF  #PPM  - PPM [employer], interrogated on 11/25 for MRI - shows measured data WNL, normal pacemaker function, NOT dependent  - 11/22 TTE: LV systolic fxn normal w/ EF 64%, normal RV systolic fxn, mild to modr TR  - metoprolol tartrate 50mg PO BID  - Xarelto 20mg PO daily to resume on 12/9 --> Last dose Lovenox on 12/8    #Post op anemia (11/22)   - S/P 5U pRBC- lowest hgb 6.3 - 11/22  - S/P 3U FFP   - Monitor H/H (11.8/35.5 - 12/5)  - Transfuse Hgb <7 PRN    #RUE ecchymosis  #Radial artery occulusion RUE   - 11/22 LED negative  - 11/23 RUE Art Duplex: Right radial artery demonstrates diminished flow velocity at the mid forearm and is occluded at distal forearm. There is retrograde right radial artery flow at the wrist. Surrounding soft tissue edema is noted.   Antegrade flow is noted of the right ulnar artery. Patient's right arm is bruised however is warm, skin is pink, 1+pulse with good capillary refills, discussed results with patient  - 11/23 and 11/25 RUE/LUE Dopp: negative for upper ext DVT  - Monitor for s/s of ischemia     #HTN/HLD  - triamterene 37.5mg/HCTZ 25mg PO daily  - rosuvastatin 10mg PO qhs    #Sleep:   - maintain quiet hours and low stim environment  - melatonin 6mg PO qhs PRN to maximize participation in therapy during the day    #GI/Bowel:  - senna 2 tablets PO qhs  - Miralax 17g PO BID  - d/c Bisacodyl suppository 12/6  GI ppx: pantoprazole 40mg PO daily     #/Bladder:   - PVR x1 on admission (SC if >400cc)    #Skin/Pressure Injury Assessment:   - Skin assessment on admission: ST L groin 4.5x1.5cm, 2.5x0.5cm ST mid upper back, 26cm mid thoracic/lumbar incision, scabbed, CDI w/ 3 drain sites, L gluteal blanchable redness with localized pustules, BLUE ecchymosis (R>L)    #Diet  Current Diet: Regular diet  - Nutrition consult     #Precautions / PROPHYLAXIS:   - Falls, Spinal  - ortho: Weight bearing status: WBAT, OOB w/ TLSO brace  - Lungs: Aspiration, Incentive Spirometer   - DVT ppx: Lovenox 40mg daily until 12/8, Xarelto 20mg daily starting 12/9, SCDs  - Pressure injury/Skin: OOB to Chair, PT/OT      ---------------  Outpatient Follow-up:    Jony Allen  Neurosurgery  [address]  [place] [address]  Phone: [phone]  Fax: [phone]  Follow Up Time:     DREW AMAYA  [address], [address]  [place] [address]    -------------- Assessment/Plan:  80yo woman w/ h/o HTN, HLD, Afib, sick sinus syndrome/heart block, atrial flutter s/p cardiac ablation x1 (2016), PPM dependent dual chamber status (2013), breast augmentation, and chronic lower back pain who presented to [facility] on 11/20 with non radiating lower back pain, BLE weakness (R>L) and difficulty ambulating. Surgical management was recommended and she is s/p T11-pelvis instrumentation and fusion; L4-L5, L5-S1 TLIFs, T12-L1, L1-L2, L2-L3, L3-L4 posterior column osteotomies for coronal scoliosis deformity correction with Dr. Allen on 11/20. Post op course c/b post op anemia requiring multiple pRBC, airway edema s/p extubation, treated w/ steroids, dysphagia, persistent R sided weakness/BLE w/paresthesias post op (XR stable, MRI stable posterior fusion, no spinal cord compression; self limiting). Patient was evaluated by PM&R and therapy for functional deficits, gait/ADL impairments and acute rehabilitation was recommended. Patient was cleared for discharge to [facility] IRF on 12/2/24.    * Markedly improved LE motor strength   * Continue lidocaine patch to right thigh  * Monitor Na level, asymptomatic   * Spine surgical site edges together    #Severe kyphoscoliosis of the thoracolumbar spine with trunk shift and severe stenosis at L4-5 s/p surgery  - S/P  instrumentation and fusion surgery by Dr. Cox (11/20/2024)      * T11-pelvis instrumentation and fusion      * L4-L5, L5-S1 TLIFs      * T12-L1, L1-L2, L2-L3, L3-L4 posterior column osteotomies for coronal scoliosis deformity correction      * Pain management           - Tylenol 650mg PO q6h PRN for mild pain           - Robaxin 500mg PO TID PRN for muscle spasm           - tramadol 50mg PO q6h PRN for moderate pain           - gabapentin 600mg PO TID           - add lidocaine patch to right thigh 12/6/24      * TLSO on ambulation  - Follow-up T/L spine MRI (11/25/24)      * Re-demonstration of posterior fusion of T11-S1, bilateral sacroiliac fusion and multilevel facetectomies      * No gross evidence for abnormal intrinsic cord signal. No spinal cord compression, no significant spinal canal stenosis in the thoracic region.       * Expected post-op edema throughout the posterior paraspinal soft tissue at the levels of the surgery  - Deficits: Post op persistent BLE weakness/paresthesias   - Comprehensive Multidisciplinary Rehab Program:      * 3 hours a day, 5 days a week      * PT 2hr/day: Focused on improving strength, endurance, coordination, balance, functional mobility, and transfers      * OT 1hr/day: Focused on improving strength, fine motor skills, coordination, posture and ADLs  - Activity Limitations: Decreased social, vocational and leisure activities, decreased self care and ADLs, decreased mobility, decreased ability to manage household and finance    -----------------------------------------------------------------------------------  Concurrent Medical Problems    #Airway edema s/p extubation (resolved)  - s/p dexamethasone treatment     #PAF  #PPM  - PPM [employer], interrogated on 11/25 for MRI - shows measured data WNL, normal pacemaker function, NOT dependent  - 11/22 TTE: LV systolic fxn normal w/ EF 64%, normal RV systolic fxn, mild to modr TR  - metoprolol tartrate 50mg PO BID  - Xarelto 20mg PO daily to resume on 12/9 --> Last dose Lovenox on 12/8    #Post op anemia (11/22)   - S/P 5U pRBC- lowest hgb 6.3 - 11/22  - S/P 3U FFP   - Monitor H/H (11.8/35.5 - 12/5)  - Transfuse Hgb <7 PRN    #RUE ecchymosis  #Radial artery occulusion RUE   - 11/22 LED negative  - 11/23 RUE Art Duplex: Right radial artery demonstrates diminished flow velocity at the mid forearm and is occluded at distal forearm. There is retrograde right radial artery flow at the wrist. Surrounding soft tissue edema is noted.   Antegrade flow is noted of the right ulnar artery. Patient's right arm is bruised however is warm, skin is pink, 1+pulse with good capillary refills, discussed results with patient  - 11/23 and 11/25 RUE/LUE Dopp: negative for upper ext DVT  - Monitor for s/s of ischemia     #HTN/HLD  - triamterene 37.5mg/HCTZ 25mg PO daily  - rosuvastatin 10mg PO qhs    #Sleep:   - maintain quiet hours and low stim environment  - melatonin 6mg PO qhs PRN to maximize participation in therapy during the day    #GI/Bowel:  - senna 2 tablets PO qhs  - Miralax 17g PO BID  - d/c Bisacodyl suppository 12/6  GI ppx: pantoprazole 40mg PO daily     #/Bladder:   - PVR x1 on admission (SC if >400cc)    #Skin/Pressure Injury Assessment:   - Skin assessment on admission: ST L groin 4.5x1.5cm, 2.5x0.5cm ST mid upper back, 26cm mid thoracic/lumbar incision, scabbed, CDI w/ 3 drain sites, L gluteal blanchable redness with localized pustules, BLUE ecchymosis (R>L)    #Diet  Current Diet: Regular diet  - Nutrition consult     #Precautions / PROPHYLAXIS:   - Falls, Spinal  - ortho: Weight bearing status: WBAT, OOB w/ TLSO brace  - Lungs: Aspiration, Incentive Spirometer   - DVT ppx: Lovenox 40mg daily until 12/8, Xarelto 20mg daily starting 12/9, SCDs  - Pressure injury/Skin: OOB to Chair, PT/OT      ---------------  Outpatient Follow-up:    Jony Allen  Neurosurgery  [address], [address]  [place] [address]  Phone: [phone]  Fax: [phone]  Follow Up Time:     DREW AMAYA  [address], [address]  [place] [address]    --------------

## 2024-12-06 NOTE — PROGRESS NOTE ADULT - SUBJECTIVE AND OBJECTIVE BOX
PROGRESS NOTE:     Patient is a 79y old  Female who presents with a chief complaint of Severe kyphoscoliosis of the thoracolumbar spine with trunk shift and severe stenosis at L4-5 s/p surgery (05 Dec 2024 11:23)      SUBJECTIVE / OVERNIGHT EVENTS: pt was seen and evaluated today  no complains offered  she was resting comfortably    ADDITIONAL REVIEW OF SYSTEMS: as per HPI    MEDICATIONS  (STANDING):  bisacodyl Suppository 10 milliGRAM(s) Rectal daily  enoxaparin Injectable 40 milliGRAM(s) SubCutaneous <User Schedule>  gabapentin 600 milliGRAM(s) Oral every 8 hours  metoprolol tartrate 50 milliGRAM(s) Oral two times a day  pantoprazole    Tablet 40 milliGRAM(s) Oral before breakfast  polyethylene glycol 3350 17 Gram(s) Oral two times a day  rosuvastatin 10 milliGRAM(s) Oral at bedtime  senna 2 Tablet(s) Oral at bedtime  triamterene 37.5 mG/hydrochlorothiazide 25 mG Tablet 1 Tablet(s) Oral daily    MEDICATIONS  (PRN):  acetaminophen     Tablet .. 650 milliGRAM(s) Oral every 6 hours PRN Temp greater or equal to 38C (100.4F), Mild Pain (1 - 3)  methocarbamol 500 milliGRAM(s) Oral every 8 hours PRN Muscle Spasm  traMADol 50 milliGRAM(s) Oral every 6 hours PRN Moderate Pain (4 - 6)      CAPILLARY BLOOD GLUCOSE        I&O's Summary      PHYSICAL EXAM:  Vital Signs Last 24 Hrs  T(C): 36.5 (06 Dec 2024 08:39), Max: 36.8 (05 Dec 2024 19:56)  T(F): 97.7 (06 Dec 2024 08:39), Max: 98.2 (05 Dec 2024 19:56)  HR: 69 (06 Dec 2024 08:39) (69 - 95)  BP: 116/53 (06 Dec 2024 08:39) (116/53 - 120/60)  BP(mean): --  RR: 16 (06 Dec 2024 08:39) (16 - 18)  SpO2: 95% (06 Dec 2024 08:39) (95% - 95%)    Parameters below as of 06 Dec 2024 08:39  Patient On (Oxygen Delivery Method): room air    GENERAL: NAD,  no increased WOB  HEAD:  Atraumatic, Normocephalic  EYES: EOMI, conjunctiva and sclera clear  ENMT: Moist mucous membranes  NECK: Supple, No JVD  NERVOUS SYSTEM:  Alert & Oriented    CHEST/LUNG: Clear to auscultation bilaterally; No rales, rhonchi, wheezing  HEART: Regular rate and rhythm  ABDOMEN: Soft, Nontender, Nondistended; Bowel sounds present  EXTREMITIES: No clubbing, cyanosis, calf tenderness or edema b/l      LABS:                        11.8   7.19  )-----------( 425      ( 05 Dec 2024 08:45 )             35.5     12-05    131[L]  |  97  |  13  ----------------------------<  118[H]  3.8   |  29  |  0.61    Ca    9.1      05 Dec 2024 08:45    TPro  6.9  /  Alb  2.7[L]  /  TBili  0.6  /  DBili  x   /  AST  19  /  ALT  19  /  AlkPhos  108  12-05          Urinalysis Basic - ( 05 Dec 2024 08:45 )    Color: x / Appearance: x / SG: x / pH: x  Gluc: 118 mg/dL / Ketone: x  / Bili: x / Urobili: x   Blood: x / Protein: x / Nitrite: x   Leuk Esterase: x / RBC: x / WBC x   Sq Epi: x / Non Sq Epi: x / Bacteria: x        case discussed during IDR and with consultants

## 2024-12-06 NOTE — PROGRESS NOTE ADULT - SUBJECTIVE AND OBJECTIVE BOX
HPI:  Mrs. Ct Levine 80 y/o F w/ HTN, HLD, Afib atrial flutter, sick sinus syndrome/heart block, atrial flutter s/p cardiac ablation x1 ( 2016), PPM dependent dual chamber status (2013), breast augmentation, and chronic lower back pain who presented to [facility] on 11/20 w/c/o non radiating lower back pain, BLE weakness (R>L) and difficulty ambulating. Surgical management was recommended and she is s/p T11-pelvis instrumentation and fusion; L4-L5, L5-S1 TLIFs, T12-L1, L1-L2, L2-L3, L3-L4 posterior column osteotomies for coronal scoliosis deformity correction with Dr. Allen on 11/20. Post op course c/b post op anemia requiring multiple pRBC, airway edema s/p extubation, treated w/ steroids, dysphagia, persistent R sided weakness/BLE w/paresthesias post op (XR stable, MRI stable posterior fusion, no spinal cord compression; self limiting). Patient was evaluated by PM&R and therapy for functional deficits, gait/ADL impairments and acute rehabilitation was recommended. Patient was cleared for discharge to [facility] IRF on 12/2/24. (02 Dec 2024 13:13)    TDD 12/18/24 to home  ___________________________________________________________________________    SUBJECTIVE/ROS  Patient was seen and evaluated at bedside today.  Reported no overnight events and is in no acute distress. She reports that her pain improved since yesterday and attributes it to a lidocaine patch on her right thigh, which she says relieves the aching pain. She continues to report numbness and tingling of the right thigh and foot with no effects on the lower leg. She is eager to get home so that she can return to work and would love to move her discharge date to an earlier date. She reports her PT thinks she may be ready for d/c by late next week.  Denies any CP, SOB, palpitations, cough, or any other symptoms at this time.   ___________________________________________________________________________    VITALS  T(C): 36.5 (12-06-24 @ 08:39), Max: 36.8 (12-05-24 @ 19:56)  HR: 69 (12-06-24 @ 08:39) (69 - 95)  BP: 116/53 (12-06-24 @ 08:39) (116/53 - 120/60)  RR: 16 (12-06-24 @ 08:39) (16 - 18)  SpO2: 95% (12-06-24 @ 08:39) (95% - 95%)  ___________________________________________________________________________    LABS             11.8   7.19  )-----------( 425      ( 05 Dec 2024 08:45 )             35.5     131[L]  |  97  |  13  ----------------------------<  118[H]  3.8   |  29  |  0.61    Ca    9.1      05 Dec 2024 08:45  TPro  6.9  /  Alb  2.7[L]  /  TBili  0.6  /  DBili  x   /  AST  19  /  ALT  19  /  AlkPhos  108  12-05    ___________________________________________________________________________    MEDICATIONS  (STANDING):  enoxaparin Injectable 40 milliGRAM(s) SubCutaneous <User Schedule>  gabapentin 600 milliGRAM(s) Oral every 8 hours  lidocaine   4% Patch 1 Patch Transdermal every 24 hours  metoprolol tartrate 50 milliGRAM(s) Oral two times a day  pantoprazole    Tablet 40 milliGRAM(s) Oral before breakfast  polyethylene glycol 3350 17 Gram(s) Oral two times a day  rosuvastatin 10 milliGRAM(s) Oral at bedtime  senna 2 Tablet(s) Oral at bedtime  triamterene 37.5 mG/hydrochlorothiazide 25 mG Tablet 1 Tablet(s) Oral daily    MEDICATIONS  (PRN):  acetaminophen     Tablet .. 650 milliGRAM(s) Oral every 6 hours PRN Temp greater or equal to 38C (100.4F), Mild Pain (1 - 3)  methocarbamol 500 milliGRAM(s) Oral every 8 hours PRN Muscle Spasm  traMADol 50 milliGRAM(s) Oral every 6 hours PRN Moderate Pain (4 - 6)    ___________________________________________________________________________    PHYSICAL EXAM:    Constitutional - NAD, Comfortable  Chest - good chest expansion, good respiratory effort  Cardio - warm and well perfused  Extremities - + mild bilateral LE edema, No calf tenderness   Neurologic Exam:                           Orientation: Awake, A&O to self, place, date, year, situation     Speech - Fluent, Comprehensible, No dysarthria, No aphasia      Motor -                      LEFT    LE - KE 2/5, DF 4/5, PF 5/5                    RIGHT LE - HF 1/5, KE 2/5, DF 2/5, PF 4/5        Sensory - Diminished to LT R foot and thigh; intact to LT right lower leg and left leg  Psychiatric - Mood stable, Affect WNL  ___________________________________________________________________________ HPI:  Mrs. Ct Levine 78 y/o F w/ HTN, HLD, Afib atrial flutter, sick sinus syndrome/heart block, atrial flutter s/p cardiac ablation x1 ( 2016), PPM dependent dual chamber status (2013), breast augmentation, and chronic lower back pain who presented to [facility] on 11/20 w/c/o non radiating lower back pain, BLE weakness (R>L) and difficulty ambulating. Surgical management was recommended and she is s/p T11-pelvis instrumentation and fusion; L4-L5, L5-S1 TLIFs, T12-L1, L1-L2, L2-L3, L3-L4 posterior column osteotomies for coronal scoliosis deformity correction with Dr. Allen on 11/20. Post op course c/b post op anemia requiring multiple pRBC, airway edema s/p extubation, treated w/ steroids, dysphagia, persistent R sided weakness/BLE w/paresthesias post op (XR stable, MRI stable posterior fusion, no spinal cord compression; self limiting). Patient was evaluated by PM&R and therapy for functional deficits, gait/ADL impairments and acute rehabilitation was recommended. Patient was cleared for discharge to [facility] IRF on 12/2/24. (02 Dec 2024 13:13)    TDD 12/18/24 to home  ___________________________________________________________________________    SUBJECTIVE/ROS  Patient was seen and evaluated at bedside today.  Reported no overnight events and is in no acute distress. She reports that her pain improved since yesterday and attributes it to a lidocaine patch on her right thigh, which she says relieves the aching pain. She continues to report numbness and tingling of the right thigh and foot with no effects on the lower leg. She is eager to get home so that she can return to work and would love to move her discharge date to an earlier date. She reports her PT thinks she may be ready for d/c by late next week.  Denies any CP, SOB, palpitations, cough, or any other symptoms at this time.   ___________________________________________________________________________    VITALS  T(C): 36.5 (12-06-24 @ 08:39), Max: 36.8 (12-05-24 @ 19:56)  HR: 69 (12-06-24 @ 08:39) (69 - 95)  BP: 116/53 (12-06-24 @ 08:39) (116/53 - 120/60)  RR: 16 (12-06-24 @ 08:39) (16 - 18)  SpO2: 95% (12-06-24 @ 08:39) (95% - 95%)  ___________________________________________________________________________    LABS             11.8   7.19  )-----------( 425      ( 05 Dec 2024 08:45 )             35.5     131[L]  |  97  |  13  ----------------------------<  118[H]  3.8   |  29  |  0.61    Ca    9.1      05 Dec 2024 08:45  TPro  6.9  /  Alb  2.7[L]  /  TBili  0.6  /  DBili  x   /  AST  19  /  ALT  19  /  AlkPhos  108  12-05    ___________________________________________________________________________    MEDICATIONS  (STANDING):  enoxaparin Injectable 40 milliGRAM(s) SubCutaneous <User Schedule>  gabapentin 600 milliGRAM(s) Oral every 8 hours  lidocaine   4% Patch 1 Patch Transdermal every 24 hours  metoprolol tartrate 50 milliGRAM(s) Oral two times a day  pantoprazole    Tablet 40 milliGRAM(s) Oral before breakfast  polyethylene glycol 3350 17 Gram(s) Oral two times a day  rosuvastatin 10 milliGRAM(s) Oral at bedtime  senna 2 Tablet(s) Oral at bedtime  triamterene 37.5 mG/hydrochlorothiazide 25 mG Tablet 1 Tablet(s) Oral daily    MEDICATIONS  (PRN):  acetaminophen     Tablet .. 650 milliGRAM(s) Oral every 6 hours PRN Temp greater or equal to 38C (100.4F), Mild Pain (1 - 3)  methocarbamol 500 milliGRAM(s) Oral every 8 hours PRN Muscle Spasm  traMADol 50 milliGRAM(s) Oral every 6 hours PRN Moderate Pain (4 - 6)    ___________________________________________________________________________    PHYSICAL EXAM:    Constitutional - Comfortable  Chest - good chest expansion, good respiratory effort  Cardio - warm and well perfused  Extremities - + mild bilateral LE edema, No calf tenderness   Neurologic Exam:                 AAO X 3      Speech - Fluent, Comprehensible, No dysarthria, No aphasia      Motor -                      LEFT    LE - KE 2/5, DF 4/5, PF 5/5                    RIGHT LE - HF 3/5, KE 3/5, DF 3/5, PF 4/5        Sensory - Diminished to LT R foot and thigh; intact to LT right lower leg and left leg  Psychiatric - Mood stable, Affect WNL    Function  Ambulated 100 ft x2 trials with RW and CG/occasional Esteban; slow nette,  continued unsteady gait with decreased step length, pt with decreased bilateral  ankle dorsiflexion at initial contact; required standing rest breaks during  ambulation trial due to fatigue; cues for increased right quad activation  through terminal knee extension to combat spontaneous buckling; noted right  toeing out upon all gait training, occasional tactile assistance at RW due to RW  tilting off ground at times    Curb Training: negotiated 4" curb x2 continuous trials with RW with  CG/occasional Esteban; cues for safe RW placement and sequencing; pt refused 6"  curb negotiation this session due to fatigue; however, pt ready for 6" curb  progression next session  ___________________________________________________________________________

## 2024-12-07 PROCEDURE — 99232 SBSQ HOSP IP/OBS MODERATE 35: CPT

## 2024-12-07 RX ADMIN — GABAPENTIN 600 MILLIGRAM(S): 300 CAPSULE ORAL at 06:43

## 2024-12-07 RX ADMIN — TRIAMTERENE AND HYDROCHLOROTHIAZIDE 1 TABLET(S): 25; 37.5 CAPSULE ORAL at 06:43

## 2024-12-07 RX ADMIN — TRAMADOL HYDROCHLORIDE 50 MILLIGRAM(S): 300 CAPSULE ORAL at 09:43

## 2024-12-07 RX ADMIN — ENOXAPARIN SODIUM 40 MILLIGRAM(S): 30 INJECTION SUBCUTANEOUS at 18:28

## 2024-12-07 RX ADMIN — LIDOCAINE 1 PATCH: 40 CREAM TOPICAL at 07:35

## 2024-12-07 RX ADMIN — PANTOPRAZOLE SODIUM 40 MILLIGRAM(S): 40 TABLET, DELAYED RELEASE ORAL at 06:43

## 2024-12-07 RX ADMIN — TRAMADOL HYDROCHLORIDE 50 MILLIGRAM(S): 300 CAPSULE ORAL at 10:20

## 2024-12-07 RX ADMIN — METOPROLOL TARTRATE 50 MILLIGRAM(S): 100 TABLET, FILM COATED ORAL at 18:27

## 2024-12-07 RX ADMIN — GABAPENTIN 600 MILLIGRAM(S): 300 CAPSULE ORAL at 14:39

## 2024-12-07 RX ADMIN — GABAPENTIN 600 MILLIGRAM(S): 300 CAPSULE ORAL at 21:53

## 2024-12-07 RX ADMIN — LIDOCAINE 1 PATCH: 40 CREAM TOPICAL at 11:09

## 2024-12-07 RX ADMIN — ROSUVASTATIN CALCIUM 10 MILLIGRAM(S): 5 TABLET, FILM COATED ORAL at 21:53

## 2024-12-07 RX ADMIN — LIDOCAINE 1 PATCH: 40 CREAM TOPICAL at 21:54

## 2024-12-07 RX ADMIN — Medication 2 TABLET(S): at 21:54

## 2024-12-07 RX ADMIN — METOPROLOL TARTRATE 50 MILLIGRAM(S): 100 TABLET, FILM COATED ORAL at 06:43

## 2024-12-07 NOTE — PROGRESS NOTE ADULT - SUBJECTIVE AND OBJECTIVE BOX
Cc: Gait dysfunction    HPI: Patient seen and examined at bedside. No acute events overnight.   Pain overall controlled, no chest pain, no N/V, no Fevers/Chills. No other new ROS  Has been tolerating rehabilitation program.    acetaminophen     Tablet .. 650 milliGRAM(s) Oral every 6 hours PRN  enoxaparin Injectable 40 milliGRAM(s) SubCutaneous <User Schedule>  gabapentin 600 milliGRAM(s) Oral every 8 hours  lidocaine   4% Patch 1 Patch Transdermal every 24 hours  methocarbamol 500 milliGRAM(s) Oral every 8 hours PRN  metoprolol tartrate 50 milliGRAM(s) Oral two times a day  pantoprazole    Tablet 40 milliGRAM(s) Oral before breakfast  polyethylene glycol 3350 17 Gram(s) Oral two times a day  rosuvastatin 10 milliGRAM(s) Oral at bedtime  senna 2 Tablet(s) Oral at bedtime  traMADol 50 milliGRAM(s) Oral every 6 hours PRN  triamterene 37.5 mG/hydrochlorothiazide 25 mG Tablet 1 Tablet(s) Oral daily      T(C): 36.7 (12-07-24 @ 09:38), Max: 36.7 (12-07-24 @ 09:38)  HR: 62 (12-07-24 @ 09:38) (62 - 92)  BP: 143/77 (12-07-24 @ 09:38) (108/69 - 143/77)  RR: 16 (12-07-24 @ 09:38) (16 - 17)  SpO2: 96% (12-07-24 @ 09:38) (92% - 97%)    In NAD, in TLSO  HEENT- EOMI  Heart- S1S2  Lungs- CTA bl.  Abd- + BS, NT  Ext- No calf pain  Neuro- Exam unchanged    CBC 12/5/24 reviewed  CMP 12/5/24 reviewed  CMP 12/3/24 reviewed      Imp: Patient with diagnosis of scoliosis s/p T11-Pelvis fusion admitted for comprehensive acute rehabilitation.    Plan:  - Continue PT/OT/SLP as indicated  - DVT prophylaxis - Continue Lovenox  - Skin- Turn q2h, check skin daily  - Continue current medications  -Active issues-   Pain - Continue Gabapentin, Tramadol PRN, Tylenol PRN, Robaixn PRN  - Patient is stable to continue current rehabilitation program.

## 2024-12-07 NOTE — PROGRESS NOTE ADULT - SUBJECTIVE AND OBJECTIVE BOX
PROGRESS NOTE:     Patient is a 79y old  Female who presents with a chief complaint of Severe kyphoscoliosis of the thoracolumbar spine with trunk shift and severe stenosis at L4-5 s/p surgery (06 Dec 2024 12:46)          SUBJECTIVE & OBJECTIVE:   Pt seen and examined at bedside in AM    no overnight events.       REVIEW OF SYSTEMS: remaining ROS negative     PHYSICAL EXAM:  VITALS:  Vital Signs Last 24 Hrs  T(C): 36.6 (07 Dec 2024 09:02), Max: 36.6 (06 Dec 2024 22:40)  T(F): 97.8 (07 Dec 2024 09:02), Max: 97.9 (06 Dec 2024 22:40)  HR: 79 (07 Dec 2024 09:02) (78 - 92)  BP: 108/69 (07 Dec 2024 09:02) (108/69 - 116/65)  BP(mean): --  RR: 16 (07 Dec 2024 09:02) (16 - 17)  SpO2: 97% (07 Dec 2024 09:02) (92% - 97%)    Parameters below as of 07 Dec 2024 09:02  Patient On (Oxygen Delivery Method): room air          GENERAL: NAD,  no increased WOB  HEAD:  Atraumatic, Normocephalic  EYES: EOMI, conjunctiva and sclera clear  ENMT: Moist mucous membranes  NECK: Supple, No JVD  NERVOUS SYSTEM:  Alert & Oriented   CHEST/LUNG: Clear to auscultation bilaterally; No rales, rhonchi, wheezing  HEART: Regular rate and rhythm  ABDOMEN: Soft, Nontender, Nondistended; Bowel sounds present  EXTREMITIES:  No clubbing, cyanosis, calf tenderness or edema b/l      MEDICATIONS  (STANDING):  enoxaparin Injectable 40 milliGRAM(s) SubCutaneous <User Schedule>  gabapentin 600 milliGRAM(s) Oral every 8 hours  lidocaine   4% Patch 1 Patch Transdermal every 24 hours  metoprolol tartrate 50 milliGRAM(s) Oral two times a day  pantoprazole    Tablet 40 milliGRAM(s) Oral before breakfast  polyethylene glycol 3350 17 Gram(s) Oral two times a day  rosuvastatin 10 milliGRAM(s) Oral at bedtime  senna 2 Tablet(s) Oral at bedtime  triamterene 37.5 mG/hydrochlorothiazide 25 mG Tablet 1 Tablet(s) Oral daily    MEDICATIONS  (PRN):  acetaminophen     Tablet .. 650 milliGRAM(s) Oral every 6 hours PRN Temp greater or equal to 38C (100.4F), Mild Pain (1 - 3)  methocarbamol 500 milliGRAM(s) Oral every 8 hours PRN Muscle Spasm  traMADol 50 milliGRAM(s) Oral every 6 hours PRN Moderate Pain (4 - 6)      Allergies    No Known Allergies    Intolerances              LABS:                        11.8   7.19  )-----------( 425      ( 05 Dec 2024 08:45 )             35.5     12-05    131[L]  |  97  |  13  ----------------------------<  118[H]  3.8   |  29  |  0.61    Ca    9.1      05 Dec 2024 08:45    TPro  6.9  /  Alb  2.7[L]  /  TBili  0.6  /  DBili  x   /  AST  19  /  ALT  19  /  AlkPhos  108  12-05          Urinalysis Basic - ( 05 Dec 2024 08:45 )    Color: x / Appearance: x / SG: x / pH: x  Gluc: 118 mg/dL / Ketone: x  / Bili: x / Urobili: x   Blood: x / Protein: x / Nitrite: x   Leuk Esterase: x / RBC: x / WBC x   Sq Epi: x / Non Sq Epi: x / Bacteria: x

## 2024-12-07 NOTE — PROGRESS NOTE ADULT - ASSESSMENT
78 y/o F w/ HTN, HLD, Afib atrial flutter, sick sinus syndrome/heart block, atrial flutter s/p cardiac ablation x1 ( 2016), PPM dependent dual chamber status (2013), breast augmentation, and chronic lower back pain who presented to [facility] on 11/20 w/c/o non radiating lower back pain, BLE weakness (R>L) and difficulty ambulating. Surgical management was recommended and she is s/p T11-pelvis instrumentation and fusion; L4-L5, L5-S1 TLIFs, T12-L1, L1-L2, L2-L3, L3-L4 posterior column osteotomies for coronal scoliosis deformity correction with Dr. Allen on 11/20. Post op course c/b post op anemia requiring multiple pRBC, airway edema s/p extubation, treated w/ steroids, dysphagia, persistent R sided weakness/BLE w/paresthesias post op (XR stable, MRI stable posterior fusion, no spinal cord compression; self limiting). Patient was evaluated by PM&R and therapy for functional deficits, gait/ADL impairments and acute rehabilitation was recommended. Patient was cleared for discharge to [facility] IRF on 12/2/24.      #Severe kyphoscoliosis of the thoracolumbar spine with trunk shift and severe stenosis at L4-5 s/p surgery  - S/P instrumentation and fusion surgery by Dr. Cox (11/20/2024)  - Pain management per primary team  - Comprehensive Multidisciplinary Rehab Program:    #PAF  #PPM  - PPM [employer], interrogated on 11/25 for MRI - shows measured data WNL, normal pacemaker function, NOT dependent.  - 11/22 TTE: LV systolic fxn normal w/ EF 64%, normal RV systolic fxn, mild to modr TR  - Metoprolol 50mg BID  - Xarelto 20mg daily to resume on 12/9 --> Last dose Lovenox on 12/8    #Post op anemia (11/22)   - S/P 5U rRBC- lowest hgb 6.3 - 11/22  - S/P 3U FFP   - Monitor H/H  - Transfuse Hgb <7 PRN    #RUE ecchymosis  #Radial artery occulusion RUE   - 11/22 LED negative  - 11/23 RUE Art Duplex: Right radial artery demonstrates diminished flow velocity at the mid forearm and is occluded at distal forearm. There is retrograde right radial artery flow at the wrist. Surrounding soft tissue edema is noted.  Antegrade flow is noted of the right ulnar artery. Patient's right arm is bruised however is warm, skin is pink, 1+pulse with good capillary refills, discussed results with patient.   - 11/23 and 11/25 RUE/LUE Dopp: negative for upper ext DVT.   - Monitor for s/s of ischemia     #HTN/HLD  - Vibnfguznlv93.5mg/[id number] daily  - monitor for OH  - Rosuvastatin 10mg HS    # Moderate protein-calorie malnutrition.  RD following    #GI ppx: Pantoprazole 40mg daily     #DVT ppx: Lovenox 40mg daily until 12/8, Xarelto 20mg daily starting 12/9, SCDs

## 2024-12-08 PROCEDURE — 99232 SBSQ HOSP IP/OBS MODERATE 35: CPT

## 2024-12-08 PROCEDURE — 72128 CT CHEST SPINE W/O DYE: CPT | Mod: 26

## 2024-12-08 PROCEDURE — 99233 SBSQ HOSP IP/OBS HIGH 50: CPT

## 2024-12-08 PROCEDURE — 72131 CT LUMBAR SPINE W/O DYE: CPT | Mod: 26

## 2024-12-08 RX ADMIN — ROSUVASTATIN CALCIUM 10 MILLIGRAM(S): 5 TABLET, FILM COATED ORAL at 21:34

## 2024-12-08 RX ADMIN — LIDOCAINE 1 PATCH: 40 CREAM TOPICAL at 21:33

## 2024-12-08 RX ADMIN — LIDOCAINE 1 PATCH: 40 CREAM TOPICAL at 18:51

## 2024-12-08 RX ADMIN — TRIAMTERENE AND HYDROCHLOROTHIAZIDE 1 TABLET(S): 25; 37.5 CAPSULE ORAL at 06:08

## 2024-12-08 RX ADMIN — TRAMADOL HYDROCHLORIDE 50 MILLIGRAM(S): 300 CAPSULE ORAL at 08:07

## 2024-12-08 RX ADMIN — METOPROLOL TARTRATE 50 MILLIGRAM(S): 100 TABLET, FILM COATED ORAL at 06:08

## 2024-12-08 RX ADMIN — GABAPENTIN 600 MILLIGRAM(S): 300 CAPSULE ORAL at 21:34

## 2024-12-08 RX ADMIN — GABAPENTIN 600 MILLIGRAM(S): 300 CAPSULE ORAL at 14:08

## 2024-12-08 RX ADMIN — METOPROLOL TARTRATE 50 MILLIGRAM(S): 100 TABLET, FILM COATED ORAL at 17:21

## 2024-12-08 RX ADMIN — LIDOCAINE 1 PATCH: 40 CREAM TOPICAL at 18:52

## 2024-12-08 RX ADMIN — PANTOPRAZOLE SODIUM 40 MILLIGRAM(S): 40 TABLET, DELAYED RELEASE ORAL at 06:08

## 2024-12-08 RX ADMIN — GABAPENTIN 600 MILLIGRAM(S): 300 CAPSULE ORAL at 06:08

## 2024-12-08 RX ADMIN — TRAMADOL HYDROCHLORIDE 50 MILLIGRAM(S): 300 CAPSULE ORAL at 09:00

## 2024-12-08 NOTE — PROVIDER CONTACT NOTE (FALL NOTIFICATION) - SITUATION
Pt. s/p laminectomy was being assisted to the bathroom with PCA when right leg buckled and patient fell.  Pt. denies hitting head.

## 2024-12-08 NOTE — PROGRESS NOTE ADULT - ASSESSMENT
80 y/o F w/ HTN, HLD, Afib atrial flutter, sick sinus syndrome/heart block, atrial flutter s/p cardiac ablation x1 ( 2016), PPM dependent dual chamber status (2013), breast augmentation, and chronic lower back pain who presented to [facility] on 11/20 w/c/o non radiating lower back pain, BLE weakness (R>L) and difficulty ambulating. Surgical management was recommended and she is s/p T11-pelvis instrumentation and fusion; L4-L5, L5-S1 TLIFs, T12-L1, L1-L2, L2-L3, L3-L4 posterior column osteotomies for coronal scoliosis deformity correction with Dr. Allen on 11/20. Post op course c/b post op anemia requiring multiple pRBC, airway edema s/p extubation, treated w/ steroids, dysphagia, persistent R sided weakness/BLE w/paresthesias post op (XR stable, MRI stable posterior fusion, no spinal cord compression; self limiting). Patient was evaluated by PM&R and therapy for functional deficits, gait/ADL impairments and acute rehabilitation was recommended. Patient was cleared for discharge to [facility] IRF on 12/2/24.      #mechanical fall  - 12/8: was being assisted to the bathroom with PCA when right leg buckled and patient fell  - No LOC, headstrike. Denies CP, SOB, palpitations, n/v/headaches. VSS.   - f/u CT Thoracic/lumbar to rule out acute changes    #Severe kyphoscoliosis of the thoracolumbar spine with trunk shift and severe stenosis at L4-5 s/p surgery  - S/P instrumentation and fusion surgery by Dr. Cox (11/20/2024)  - Pain management per primary team  - Comprehensive Multidisciplinary Rehab Program:    #PAF  #PPM  - PPM [employer], interrogated on 11/25 for MRI - shows measured data WNL, normal pacemaker function, NOT dependent.  - 11/22 TTE: LV systolic fxn normal w/ EF 64%, normal RV systolic fxn, mild to modr TR  - Metoprolol 50mg BID  - Xarelto 20mg daily to resume on 12/9 --> Last dose Lovenox on 12/8    #Post op anemia (11/22)   - S/P 5U rRBC- lowest hgb 6.3 - 11/22  - S/P 3U FFP   - Monitor H/H  - Transfuse Hgb <7 PRN    #RUE ecchymosis  #Radial artery occulusion RUE   - 11/22 LED negative  - 11/23 RUE Art Duplex: Right radial artery demonstrates diminished flow velocity at the mid forearm and is occluded at distal forearm. There is retrograde right radial artery flow at the wrist. Surrounding soft tissue edema is noted.  Antegrade flow is noted of the right ulnar artery. Patient's right arm is bruised however is warm, skin is pink, 1+pulse with good capillary refills, discussed results with patient.   - 11/23 and 11/25 RUE/LUE Dopp: negative for upper ext DVT.   - Monitor for s/s of ischemia     #HTN/HLD  - Pbqubjvffjx74.5mg/[id number] daily  - monitor for OH  - Rosuvastatin 10mg HS    # Moderate protein-calorie malnutrition.  RD following    #GI ppx: Pantoprazole 40mg daily     #DVT ppx: Lovenox 40mg daily until 12/8, Xarelto 20mg daily starting 12/9, SCDs

## 2024-12-08 NOTE — CHART NOTE - NSCHARTNOTEFT_GEN_A_CORE
Patient sustained fall earlier today. As a result, CT T-L spine obtained, interval changes notable for new mildly displaced right L3 transverse process fracture. Spoke with NSGY at [facility], Dr. Mackey, who relayed messages to primary Neurosurgeon, Dr. Allen. Based on scan, no neurosurgical intervention, per Dr. Allen via Dr. Mackey. On topic of AC, agree to resume Xarelto tomorrow, as previously scheduled. TATI, hospitalist, Dr. Chauhan also in agreement to hold Lovenox tonight. Patient also to receive q4h neuro checks this evening. ERNESTINA marte, d/w nursing supervisor. Patient also made aware after conversation held at bedside between myself and she. Patient sustained fall earlier today. As a result, CT T-L spine obtained, interval changes notable for new mildly displaced right L3 transverse process fracture. Spoke with NSGY at [facility], Dr. Mackey, who relayed messages to primary Neurosurgeon, Dr. Allen. Based on scan, no neurosurgical intervention, per Dr. Allen via Dr. Mackey. On topic of AC, agree to resume Xarelto tomorrow, as previously scheduled. TATI, hospitalist, Dr. Chauhan also in agreement to hold Lovenox tonight. Patient also to receive q4h neuro checks this evening. ERNESTINA marte, d/w nursing supervisor. Patient also made aware after conversation held at bedside between myself and she. Patient also denies any back pain, new neurologic deficits including b/b dysfunction, or changes in lower extremity strength or sensation

## 2024-12-08 NOTE — PROGRESS NOTE ADULT - SUBJECTIVE AND OBJECTIVE BOX
Cc: Gait dysfunction    HPI: Patient seen and examined at bedside. No acute events overnight but did have a fall this AM. Patient was going to the bathroom and just lost her balance and lightly hit her back on the end of a bed as she desended to the floor. Denies any LOC or head strike. Denies any new symptoms or pain. Denies any weakness.  No chest pain, no N/V, no Fevers/Chills. No other new ROS  Has been tolerating rehabilitation program.    acetaminophen     Tablet .. 650 milliGRAM(s) Oral every 6 hours PRN  enoxaparin Injectable 40 milliGRAM(s) SubCutaneous <User Schedule>  gabapentin 600 milliGRAM(s) Oral every 8 hours  lidocaine   4% Patch 1 Patch Transdermal every 24 hours  methocarbamol 500 milliGRAM(s) Oral every 8 hours PRN  metoprolol tartrate 50 milliGRAM(s) Oral two times a day  pantoprazole    Tablet 40 milliGRAM(s) Oral before breakfast  polyethylene glycol 3350 17 Gram(s) Oral two times a day  rosuvastatin 10 milliGRAM(s) Oral at bedtime  senna 2 Tablet(s) Oral at bedtime  traMADol 50 milliGRAM(s) Oral every 6 hours PRN  triamterene 37.5 mG/hydrochlorothiazide 25 mG Tablet 1 Tablet(s) Oral daily      T(C): 36.8 (12-08-24 @ 08:40), Max: 37.3 (12-07-24 @ 20:57)  HR: 74 (12-08-24 @ 08:40) (62 - 77)  BP: 146/76 (12-08-24 @ 08:40) (113/62 - 146/76)  RR: 15 (12-08-24 @ 08:40) (15 - 16)  SpO2: 93% (12-08-24 @ 08:40) (93% - 96%)    In NAD  HEENT- EOMI  Heart- S1S2  Lungs- CTA bl.  Abd- + BS, NT  Ext- No calf pain  Neuro- Exam unchanged  Skin - incision site c/d/i, no bruising or ecchymosis seen over back    MR T/L Spine 11/25/24 reviewed and interpreted by me: s/p T11-pelvis PSF    ACC: [id number] EXAM: MR SPINE THORACIC ORDERED BY: REX COTE    ACC: [id number] EXAM: MR SPINE LUMBAR ORDERED BY: REX COTE    PROCEDURE DATE: 11/25/2024        INTERPRETATION: MRI of the thoracic and lumbar spine without contrast    CLINICAL INDICATION: Status post T11 through pelvic fusion, increasing right lower extremity weakness    TECHNIQUE: Multiplanar, multisequence MR images of the thoracic and lumbar spine were obtained without the administration of intravenous contrast.    COMPARISON: CT thoracic and lumbar spine 11/21/2024    FINDINGS:    MRI THORACIC SPINE:    Study is limited by motion, especially the axial T2-weighted images.    Redemonstration of posterior fusion of T11-S1, bilateral sacroiliac fusion, and multilevel facetectomies.    Susceptibility artifact obscures the T11 and T12 vertebral bodies.    Remaining thoracic vertebrae demonstrate normal height, facet alignment, and marrow signal homogeneity.    No gross evidence for abnormal intrinsic cord signal.    No spinal cord compression.    No significant spinal canal stenosis in the thoracic region. Mild multilevel neural foraminal narrowing lower thoracic region.    Expected postoperative edema throughout the posterior paraspinal soft tissues from T9 through the inferior extent of the examination.    Bilateral pleural effusions noted.    MRI LUMBAR SPINE:    Redemonstration of posterior fusion of T11-S1, bilateral sacroiliac fusion, and multilevel facetectomies.    Susceptibility artifact limits evaluation of the L1-S1 vertebral bodies. Within the confines of this limitation, vertebral body height, marrow signal homogeneity, and alignment are maintained.    Susceptibility artifact significantly limits evaluation of spinal canal contents.    Within the confines of this limitation, there is multilevel mild spinal canal stenosis and neural foraminal narrowing secondary to multilevel degenerative changes characterized by degenerative disc disease and facet/ligamentous hypertrophy.    Conus ends at approximately T12.    Expected postoperative edema throughout the posterior paraspinal soft tissues at the levels of surgery.    IMPRESSION:    MRI THORACIC SPINE:  Redemonstration of posterior fusion of T11-S1, bilateral sacroiliac fusion, and multilevel facetectomies.    No gross evidence for abnormal intrinsic cord signal.    No spinal cord compression.    No significant spinal canal stenosis in the thoracic region. Mild multilevel neural foraminal narrowing lower thoracic region.    Expected postoperative edema throughout the posterior paraspinal soft tissues from T9 through the inferior extent of the examination.    MRI LUMBAR SPINE:  Redemonstration of posterior fusion of T11-S1, bilateral sacroiliac fusion, and multilevel facetectomies.    Mild multilevel mild spinal canal stenosis and neural foraminal narrowing secondary to multilevel degenerative changes characterized by degenerative disc disease and facet/ligamentous hypertrophy.    Expected postoperative edema throughout the posterior paraspinal soft tissues at the levels of surgery.        --- End of Report ---            MARIAJOSE MUNOZ MD; Attending Radiologist  This document has been electronically signed. Nov 25 2024 5:02PM      Imp: Patient with diagnosis of scoliosis s/p T11-Pelvis fusion admitted for comprehensive acute rehabilitation.    Plan:  - Continue PT/OT/SLP as indicated  - DVT prophylaxis - Continue Lovenox  - Skin- Turn q2h, check skin daily  - Continue current medications  -Active issues-   Fall - given hx of recent surgery, will obtain CT T/L Spine to r/o acute pathology  Pain - Continue Gabapentin, Tramadol PRN, Tylenol PRN, Robaixn PRN  - Patient is stable to continue current rehabilitation program.

## 2024-12-08 NOTE — PROGRESS NOTE ADULT - SUBJECTIVE AND OBJECTIVE BOX
PROGRESS NOTE:     Patient is a 79y old  Female who presents with a chief complaint of Severe kyphoscoliosis of the thoracolumbar spine with trunk shift and severe stenosis at L4-5 s/p surgery (06 Dec 2024 12:46)          SUBJECTIVE & OBJECTIVE:   Pt seen and examined at bedside in AM. pt was being assisted to the bathroom with PCA when right leg buckled and patient fell. denies LOC or head strike. Denies fever, chills, CP, SOB, palpitations, n/v/ headaches.   no overnight events.       REVIEW OF SYSTEMS: remaining ROS negative     PHYSICAL EXAM:  VITALS:  Vital Signs Last 24 Hrs  T(C): 36.8 (08 Dec 2024 08:40), Max: 37.3 (07 Dec 2024 20:57)  T(F): 98.3 (08 Dec 2024 08:40), Max: 99.1 (07 Dec 2024 20:57)  HR: 74 (08 Dec 2024 08:40) (66 - 77)  BP: 146/76 (08 Dec 2024 08:40) (113/62 - 146/76)  BP(mean): --  RR: 15 (08 Dec 2024 08:40) (15 - 16)  SpO2: 93% (08 Dec 2024 08:40) (93% - 95%)    Parameters below as of 08 Dec 2024 08:15  Patient On (Oxygen Delivery Method): room air          GENERAL: NAD,  no increased WOB  HEAD:  Atraumatic, Normocephalic  EYES: EOMI, conjunctiva and sclera clear  ENMT: Moist mucous membranes  NECK: Supple, No JVD  NERVOUS SYSTEM:  Alert & Oriented   CHEST/LUNG: Clear to auscultation bilaterally; No rales, rhonchi, wheezing  HEART: Regular rate and rhythm  ABDOMEN: Soft, Nontender, Nondistended; Bowel sounds present  EXTREMITIES:  No clubbing, cyanosis, calf tenderness or edema b/l      MEDICATIONS  (STANDING):  enoxaparin Injectable 40 milliGRAM(s) SubCutaneous <User Schedule>  gabapentin 600 milliGRAM(s) Oral every 8 hours  lidocaine   4% Patch 1 Patch Transdermal every 24 hours  metoprolol tartrate 50 milliGRAM(s) Oral two times a day  pantoprazole    Tablet 40 milliGRAM(s) Oral before breakfast  polyethylene glycol 3350 17 Gram(s) Oral two times a day  rosuvastatin 10 milliGRAM(s) Oral at bedtime  senna 2 Tablet(s) Oral at bedtime  triamterene 37.5 mG/hydrochlorothiazide 25 mG Tablet 1 Tablet(s) Oral daily    MEDICATIONS  (PRN):  acetaminophen     Tablet .. 650 milliGRAM(s) Oral every 6 hours PRN Temp greater or equal to 38C (100.4F), Mild Pain (1 - 3)  methocarbamol 500 milliGRAM(s) Oral every 8 hours PRN Muscle Spasm  traMADol 50 milliGRAM(s) Oral every 6 hours PRN Moderate Pain (4 - 6)        Allergies    No Known Allergies    Intolerances              LABS:                        11.8   7.19  )-----------( 425      ( 05 Dec 2024 08:45 )             35.5     12-05    131[L]  |  97  |  13  ----------------------------<  118[H]  3.8   |  29  |  0.61    Ca    9.1      05 Dec 2024 08:45    TPro  6.9  /  Alb  2.7[L]  /  TBili  0.6  /  DBili  x   /  AST  19  /  ALT  19  /  AlkPhos  108  12-05          Urinalysis Basic - ( 05 Dec 2024 08:45 )    Color: x / Appearance: x / SG: x / pH: x  Gluc: 118 mg/dL / Ketone: x  / Bili: x / Urobili: x   Blood: x / Protein: x / Nitrite: x   Leuk Esterase: x / RBC: x / WBC x   Sq Epi: x / Non Sq Epi: x / Bacteria: x

## 2024-12-09 LAB
ALBUMIN SERPL ELPH-MCNC: 2.7 G/DL — LOW (ref 3.3–5)
ALP SERPL-CCNC: 104 U/L — SIGNIFICANT CHANGE UP (ref 40–120)
ALT FLD-CCNC: 30 U/L — SIGNIFICANT CHANGE UP (ref 10–45)
ANION GAP SERPL CALC-SCNC: 7 MMOL/L — SIGNIFICANT CHANGE UP (ref 5–17)
AST SERPL-CCNC: 28 U/L — SIGNIFICANT CHANGE UP (ref 10–40)
BASOPHILS # BLD AUTO: 0.01 K/UL — SIGNIFICANT CHANGE UP (ref 0–0.2)
BASOPHILS NFR BLD AUTO: 0.3 % — SIGNIFICANT CHANGE UP (ref 0–2)
BILIRUB SERPL-MCNC: 0.5 MG/DL — SIGNIFICANT CHANGE UP (ref 0.2–1.2)
BUN SERPL-MCNC: 10 MG/DL — SIGNIFICANT CHANGE UP (ref 7–23)
CALCIUM SERPL-MCNC: 9.2 MG/DL — SIGNIFICANT CHANGE UP (ref 8.4–10.5)
CHLORIDE SERPL-SCNC: 96 MMOL/L — SIGNIFICANT CHANGE UP (ref 96–108)
CO2 SERPL-SCNC: 30 MMOL/L — SIGNIFICANT CHANGE UP (ref 22–31)
CREAT SERPL-MCNC: 0.67 MG/DL — SIGNIFICANT CHANGE UP (ref 0.5–1.3)
EGFR: 89 ML/MIN/1.73M2 — SIGNIFICANT CHANGE UP
EOSINOPHIL # BLD AUTO: 0.13 K/UL — SIGNIFICANT CHANGE UP (ref 0–0.5)
EOSINOPHIL NFR BLD AUTO: 3.4 % — SIGNIFICANT CHANGE UP (ref 0–6)
GLUCOSE SERPL-MCNC: 109 MG/DL — HIGH (ref 70–99)
HCT VFR BLD CALC: 34.5 % — SIGNIFICANT CHANGE UP (ref 34.5–45)
HGB BLD-MCNC: 11.6 G/DL — SIGNIFICANT CHANGE UP (ref 11.5–15.5)
IMM GRANULOCYTES NFR BLD AUTO: 0.3 % — SIGNIFICANT CHANGE UP (ref 0–0.9)
LYMPHOCYTES # BLD AUTO: 1.12 K/UL — SIGNIFICANT CHANGE UP (ref 1–3.3)
LYMPHOCYTES # BLD AUTO: 29.6 % — SIGNIFICANT CHANGE UP (ref 13–44)
MCHC RBC-ENTMCNC: 29.4 PG — SIGNIFICANT CHANGE UP (ref 27–34)
MCHC RBC-ENTMCNC: 33.6 G/DL — SIGNIFICANT CHANGE UP (ref 32–36)
MCV RBC AUTO: 87.3 FL — SIGNIFICANT CHANGE UP (ref 80–100)
MONOCYTES # BLD AUTO: 0.49 K/UL — SIGNIFICANT CHANGE UP (ref 0–0.9)
MONOCYTES NFR BLD AUTO: 13 % — SIGNIFICANT CHANGE UP (ref 2–14)
NEUTROPHILS # BLD AUTO: 2.02 K/UL — SIGNIFICANT CHANGE UP (ref 1.8–7.4)
NEUTROPHILS NFR BLD AUTO: 53.4 % — SIGNIFICANT CHANGE UP (ref 43–77)
NRBC # BLD: 0 /100 WBCS — SIGNIFICANT CHANGE UP (ref 0–0)
PLATELET # BLD AUTO: 392 K/UL — SIGNIFICANT CHANGE UP (ref 150–400)
POTASSIUM SERPL-MCNC: 3.6 MMOL/L — SIGNIFICANT CHANGE UP (ref 3.5–5.3)
POTASSIUM SERPL-SCNC: 3.6 MMOL/L — SIGNIFICANT CHANGE UP (ref 3.5–5.3)
PROT SERPL-MCNC: 7 G/DL — SIGNIFICANT CHANGE UP (ref 6–8.3)
RBC # BLD: 3.95 M/UL — SIGNIFICANT CHANGE UP (ref 3.8–5.2)
RBC # FLD: 15.8 % — HIGH (ref 10.3–14.5)
SODIUM SERPL-SCNC: 133 MMOL/L — LOW (ref 135–145)
WBC # BLD: 3.78 K/UL — LOW (ref 3.8–10.5)
WBC # FLD AUTO: 3.78 K/UL — LOW (ref 3.8–10.5)

## 2024-12-09 PROCEDURE — 99232 SBSQ HOSP IP/OBS MODERATE 35: CPT

## 2024-12-09 RX ORDER — GABAPENTIN 300 MG/1
800 CAPSULE ORAL EVERY 8 HOURS
Refills: 0 | Status: DISCONTINUED | OUTPATIENT
Start: 2024-12-09 | End: 2024-12-11

## 2024-12-09 RX ORDER — TRAMADOL HYDROCHLORIDE 300 MG/1
50 CAPSULE ORAL EVERY 6 HOURS
Refills: 0 | Status: DISCONTINUED | OUTPATIENT
Start: 2024-12-09 | End: 2024-12-14

## 2024-12-09 RX ADMIN — TRAMADOL HYDROCHLORIDE 50 MILLIGRAM(S): 300 CAPSULE ORAL at 10:22

## 2024-12-09 RX ADMIN — GABAPENTIN 600 MILLIGRAM(S): 300 CAPSULE ORAL at 05:40

## 2024-12-09 RX ADMIN — METOPROLOL TARTRATE 50 MILLIGRAM(S): 100 TABLET, FILM COATED ORAL at 17:40

## 2024-12-09 RX ADMIN — ROSUVASTATIN CALCIUM 10 MILLIGRAM(S): 5 TABLET, FILM COATED ORAL at 21:45

## 2024-12-09 RX ADMIN — TRIAMTERENE AND HYDROCHLOROTHIAZIDE 1 TABLET(S): 25; 37.5 CAPSULE ORAL at 05:40

## 2024-12-09 RX ADMIN — PANTOPRAZOLE SODIUM 40 MILLIGRAM(S): 40 TABLET, DELAYED RELEASE ORAL at 06:52

## 2024-12-09 RX ADMIN — LIDOCAINE 1 PATCH: 40 CREAM TOPICAL at 07:32

## 2024-12-09 RX ADMIN — LIDOCAINE 1 PATCH: 40 CREAM TOPICAL at 08:33

## 2024-12-09 RX ADMIN — GABAPENTIN 800 MILLIGRAM(S): 300 CAPSULE ORAL at 14:43

## 2024-12-09 RX ADMIN — LIDOCAINE 1 PATCH: 40 CREAM TOPICAL at 21:45

## 2024-12-09 RX ADMIN — TRAMADOL HYDROCHLORIDE 50 MILLIGRAM(S): 300 CAPSULE ORAL at 11:00

## 2024-12-09 RX ADMIN — METOPROLOL TARTRATE 50 MILLIGRAM(S): 100 TABLET, FILM COATED ORAL at 05:40

## 2024-12-09 RX ADMIN — GABAPENTIN 800 MILLIGRAM(S): 300 CAPSULE ORAL at 21:45

## 2024-12-09 RX ADMIN — RIVAROXABAN 20 MILLIGRAM(S): 10 TABLET, FILM COATED ORAL at 17:38

## 2024-12-09 RX ADMIN — Medication 2 TABLET(S): at 21:46

## 2024-12-09 NOTE — PROGRESS NOTE ADULT - ASSESSMENT
Assessment/Plan:  Mrs. Ct Levine 80 y/o F w/ HTN, HLD, Afib atrial flutter, sick sinus syndrome/heart block, atrial flutter s/p cardiac ablation x1 ( 2016), PPM dependent dual chamber status (2013), breast augmentation, and chronic lower back pain who presented to [facility] on 11/20 w/c/o non radiating lower back pain, BLE weakness (R>L) and difficulty ambulating. Surgical management was recommended and she is s/p T11-pelvis instrumentation and fusion; L4-L5, L5-S1 TLIFs, T12-L1, L1-L2, L2-L3, L3-L4 posterior column osteotomies for coronal scoliosis deformity correction with Dr. Allen on 11/20. Post op course c/b post op anemia requiring multiple pRBC, airway edema s/p extubation, treated w/ steroids, dysphagia, persistent R sided weakness/BLE w/paresthesias post op (XR stable, MRI stable posterior fusion, no spinal cord compression; self limiting). Patient was evaluated by PM&R and therapy for functional deficits, gait/ADL impairments and acute rehabilitation was recommended. Patient was cleared for discharge to [facility] on 12/2/24.    #Severe kyphoscoliosis of the thoracolumbar spine with trunk shift and severe stenosis at L4-5 s/p surgery  - S/P  instrumentation and fusion surgery by Dr. Cox (11/20/2024)      * T11-pelvis instrumentation and fusion      * L4-L5, L5-S1 TLIFs      * T12-L1, L1-L2, L2-L3, L3-L4 posterior column osteotomies for coronal scoliosis deformity correction      * Pain management: Tylenol PRN, Robaxin 500mg TID PRN, Tramadol 25mg q6h PRN, Gabapentin 400QD/ 600mg HS      * TLSO on ambulation  - Follow-up T/L spine MRI (11/25/24)      * Redemonstration of posterior fusion of T11-S1, bilateral sacroiliac fusion and multilevel facetectomies      * No gross evidence for abnormal intrinsic cord signal. No spinal cord compression, no significant spinal canal stenosis in the thoracic region.       * Expected post-op edema throughout the posterior paraspinal soft tissue at the levels of the surgery  - Deficits: Post op persistent BLE weakness/paresthesias   - Comprehensive Multidisciplinary Rehab Program:      * 3 hours a day, 5 days a week.      * PT 2hr/day: Focused on improving strength, endurance, coordination, balance, functional mobility, and transfers      * OT 1hr/day: Focused on improving strength, fine motor skills, coordination, posture and ADLs.    - Activity Limitations: Decreased social, vocational and leisure activities, decreased self care and ADLs, decreased mobility, decreased ability to manage household and finances.     -----------------------------------------------------------------------------------  Concurrent Medical Problems    #Airway edema s/p extubation  - S/P dexamethasone treatment     #PAF  #PPM  - PPM [employer], interrogated on 11/25 for MRI - shows measured data WNL, normal pacemaker function, NOT dependent.  - 11/22 TTE: LV systolic fxn normal w/ EF 64%, normal RV systolic fxn, mild to modr TR  - Metoprolol 50mg BID  - Xarelto 20mg daily to resume on 12/9 --> Last dose Lovenox on 12/8    #Post op anemia (11/22)   - S/P 5U rRBC- lowest hgb 6.3 - 11/22  - S/P 3U FFP   - Monitor H/H (11.2/34.2 - 12/2)  - Transfuse Hgb <7 PRN    #RUE ecchymosis  #Radial artery occulusion RUE   - 11/22 LED negative  - 11/23 RUE Art Duplex: Right radial artery demonstrates diminished flow velocity at the mid forearm and is occluded at distal forearm. There is retrograde right radial artery flow at the wrist. Surrounding soft tissue edema is noted.   Antegrade flow is noted of the right ulnar artery. Patient's right arm is bruised however is warm, skin is pink, 1+pulse with good capillary refills, discussed results with patient.   - 11/23 and 11/25 RUE/LUE Dopp: negative for upper ext DVT.   - Monitor for s/s of ischemia     #HTN/HLD  - [id number]/[id number] daily  - Rosuvastatin 10mg HS    #Mood/Cognition:  Neuropsychology consult PRN    #Sleep:   Maintain quiet hours and low stim environment.  Melatonin 6mg HS PRN to maximize participation in therapy during the day.     #GI/Bowel:  Senna QHS, Miralax PRN Daily, Bisacodyl suppository 10mg daily  GI ppx: Pantoprazole 40mg daily     #/Bladder:   - PVR x1 on admission (SC if > 400)    #Skin/Pressure Injury Assessment:   - Skin assessment on admission: ST L groin 4.5x1.5cm, 2.5x0.5cm ST mid upper back, 26cm mid thoracic/lumbar incision, scabbed, CDI w/ 3 drain sites, L gluteal blanchable redness with localized pustules, BLUE ecchymosis (R>L)    #Diet  Current Diet: Regular diet  Nutrition consult     #Precautions / PROPHYLAXIS:   - Falls, Spinal  - ortho: Weight bearing status: WBAT, OOB w/ TLSO brace  - Lungs: Aspiration, Incentive Spirometer   - DVT ppx: Lovenox 40mg daily until 12/8, Xarelto 20mg daily starting 12/9, SCDs  - BLE Duplex US r/o DVT on admission ---> b/l calf pain  - Pressure injury/Skin: OOB to Chair, PT/OT      ---------------  Code Status: FULL  Emergency Contact:    Outpatient Follow-up (Specialty/Name of physician):    Jony Allen  Neurosurgery  [address], [address]  [place] [address]  Phone: [phone]  Fax: [phone]  Follow Up Time:     DREW AMAYA  [address], [address]  [place] [address]      --------------

## 2024-12-09 NOTE — PROGRESS NOTE ADULT - SUBJECTIVE AND OBJECTIVE BOX
HPI:  Mrs. Ct Levine 80 y/o F w/ HTN, HLD, Afib atrial flutter, sick sinus syndrome/heart block, atrial flutter s/p cardiac ablation x1 ( 2016), PPM dependent dual chamber status (2013), breast augmentation, and chronic lower back pain who presented to [facility] on 11/20 w/c/o non radiating lower back pain, BLE weakness (R>L) and difficulty ambulating. Surgical management was recommended and she is s/p T11-pelvis instrumentation and fusion; L4-L5, L5-S1 TLIFs, T12-L1, L1-L2, L2-L3, L3-L4 posterior column osteotomies for coronal scoliosis deformity correction with Dr. Allen on 11/20. Post op course c/b post op anemia requiring multiple pRBC, airway edema s/p extubation, treated w/ steroids, dysphagia, persistent R sided weakness/BLE w/paresthesias post op (XR stable, MRI stable posterior fusion, no spinal cord compression; self limiting). Patient was evaluated by PM&R and therapy for functional deficits, gait/ADL impairments and acute rehabilitation was recommended. Patient was cleared for discharge to [facility] IRF on 12/2/24. (02 Dec 2024 13:13)    TDD: 12/18/24 to Home  ___________________________________________________________________________    SUBJECTIVE/ROS  Patient was seen and evaluated at bedside today.  Reported no overnight events and is in no acute distress.  Neuropathic pain in dermatomal distribution. Gabapentin increased to 600mg q8h.  Case was discussed at Interdisciplinary Team meeting today.  A tentative discharge date is outlined above.  Patient is eager to continue participation on the recommended rehabilitation program.  Denies any CP, SOB, REA, palpitations, fever, chills, body aches, cough, congestion, or any other symptoms at this time.   ___________________________________________________________________________    LAB                          11.8   7.19  )-----------( 425      ( 05 Dec 2024 08:45 )             35.5     12-05    131[L]  |  97  |  13  ----------------------------<  118[H]  3.8   |  29  |  0.61    Ca    9.1      05 Dec 2024 08:45    TPro  6.9  /  Alb  2.7[L]  /  TBili  0.6  /  DBili  x   /  AST  19  /  ALT  19  /  AlkPhos  108  12-05    LIVER FUNCTIONS - ( 05 Dec 2024 08:45 )  Alb: 2.7 g/dL / Pro: 6.9 g/dL / ALK PHOS: 108 U/L / ALT: 19 U/L / AST: 19 U/L / GGT: x             12-03    133[L]  |  96  |  16  ----------------------------<  123[H]  3.8   |  32[H]  |  0.71    Ca    8.9      03 Dec 2024 05:33    TPro  6.5  /  Alb  2.5[L]  /  TBili  0.6  /  DBili  x   /  AST  26  /  ALT  24  /  AlkPhos  89  12-03    ___________________________________________________________________________    MEDICATIONS  (STANDING):  gabapentin 600 milliGRAM(s) Oral every 8 hours  lidocaine   4% Patch 1 Patch Transdermal every 24 hours  metoprolol tartrate 50 milliGRAM(s) Oral two times a day  pantoprazole    Tablet 40 milliGRAM(s) Oral before breakfast  polyethylene glycol 3350 17 Gram(s) Oral two times a day  rivaroxaban 20 milliGRAM(s) Oral daily  rosuvastatin 10 milliGRAM(s) Oral at bedtime  senna 2 Tablet(s) Oral at bedtime  triamterene 37.5 mG/hydrochlorothiazide 25 mG Tablet 1 Tablet(s) Oral daily    MEDICATIONS  (PRN):  acetaminophen     Tablet .. 650 milliGRAM(s) Oral every 6 hours PRN Temp greater or equal to 38C (100.4F), Mild Pain (1 - 3)  methocarbamol 500 milliGRAM(s) Oral every 8 hours PRN Muscle Spasm  traMADol 50 milliGRAM(s) Oral every 6 hours PRN Moderate Pain (4 - 6)    ___________________________________________________________________________    PHYSICAL EXAM:  Vital Signs Last 24 Hrs  T(C): 37.2 (08 Dec 2024 20:05), Max: 37.2 (08 Dec 2024 20:05)  T(F): 98.9 (08 Dec 2024 20:05), Max: 98.9 (08 Dec 2024 20:05)  HR: 73 (09 Dec 2024 05:44) (61 - 77)  BP: 132/74 (09 Dec 2024 05:44) (104/70 - 146/76)  RR: 15 (08 Dec 2024 20:05) (15 - 15)  SpO2: 96% (08 Dec 2024 20:05) (93% - 96%)    Gen - NAD, Comfortable  HEENT - NCAT, EOMI, MMM, PERRLA, Normal Conjunctivae  Pulm - CTAB, No wheeze, No rhonchi, No crackles  Cardiovascular - RRR, S1S2, No murmurs  Chest - good chest expansion, good respiratory effort  Abdomen - Soft, NT/ND, +BS  Extremities - No C/C, + B/L calf tenderness, + B/L ankle and pedal edema   Neuro-     Cognitive - awake, alert, oriented to person, place, date, year, and situation.  Able  to follow command     Motor -                     LEFT    UE - ShAB 5/5, EF 5/5, EE 5/5,  5/5                    RIGHT UE - ShAB 5/5, EF 5/5, EE 5/5,   5/5                    LEFT    LE - HF 2/5, KE 2/5, DF 4/5, PF 5/5                    RIGHT LE - HF 1/5, KE 2/5, DF 2/5, PF 5/5        Sensory - Intact  to LT      Coordination - FTN Intact, Unable to HTS 2/2 BLE weakness     Tone - Normal  MSK: Soreness across R side abd, R anterior thigh, R ankle   Psychiatric - Mood stable, Affect WNL  Skin:  ST L groin 4.5x1.5cm, 2.5x0.5cm ST mid upper back, 26cm mid thoracic/lumbar incision, scabbed, CDI w/ 3 drain sites, L gluteal blanchable redness with localized pustules, BLUE ecchymosis (R>L).      ___________________________________________________________________________ HPI:  Mrs. Ct Levine 78 y/o F w/ HTN, HLD, Afib atrial flutter, sick sinus syndrome/heart block, atrial flutter s/p cardiac ablation x1 ( 2016), PPM dependent dual chamber status (2013), breast augmentation, and chronic lower back pain who presented to [facility] on 11/20 w/c/o non radiating lower back pain, BLE weakness (R>L) and difficulty ambulating. Surgical management was recommended and she is s/p T11-pelvis instrumentation and fusion; L4-L5, L5-S1 TLIFs, T12-L1, L1-L2, L2-L3, L3-L4 posterior column osteotomies for coronal scoliosis deformity correction with Dr. Allen on 11/20. Post op course c/b post op anemia requiring multiple pRBC, airway edema s/p extubation, treated w/ steroids, dysphagia, persistent R sided weakness/BLE w/paresthesias post op (XR stable, MRI stable posterior fusion, no spinal cord compression; self limiting). Patient was evaluated by PM&R and therapy for functional deficits, gait/ADL impairments and acute rehabilitation was recommended. Patient was cleared for discharge to [facility] IRF on 12/2/24. (02 Dec 2024 13:13)    TDD: 12/18/24 to Home  ___________________________________________________________________________    SUBJECTIVE/ROS  Patient was seen and evaluated at bedside today.  Reported no overnight events and is in no acute distress.  Neuropathic pain in dermatomal distribution. Gabapentin increased to 600mg q8h.  Case was discussed at Interdisciplinary Team meeting today.  A tentative discharge date is outlined above.  Patient is eager to continue participation on the recommended rehabilitation program.  Denies any CP, SOB, REA, palpitations, fever, chills, body aches, cough, congestion, or any other symptoms at this time.   ___________________________________________________________________________    IMAGING (per official Radiology reports)    CT THORACIC SPINE& CT LUMBAR SPINE  PROCEDURE DATE:  12/08/2024   IMPRESSION: Status post fusion spanning T11 through the pelvis with intact hardware. New mildly displaced right L3 transverse process fracture. Otherwise no acute fracture.    ___________________________________________________________________________    LAB                          11.8   7.19  )-----------( 425      ( 05 Dec 2024 08:45 )             35.5     12-05    131[L]  |  97  |  13  ----------------------------<  118[H]  3.8   |  29  |  0.61    Ca    9.1      05 Dec 2024 08:45    TPro  6.9  /  Alb  2.7[L]  /  TBili  0.6  /  DBili  x   /  AST  19  /  ALT  19  /  AlkPhos  108  12-05    LIVER FUNCTIONS - ( 05 Dec 2024 08:45 )  Alb: 2.7 g/dL / Pro: 6.9 g/dL / ALK PHOS: 108 U/L / ALT: 19 U/L / AST: 19 U/L / GGT: x             12-03    133[L]  |  96  |  16  ----------------------------<  123[H]  3.8   |  32[H]  |  0.71    Ca    8.9      03 Dec 2024 05:33    TPro  6.5  /  Alb  2.5[L]  /  TBili  0.6  /  DBili  x   /  AST  26  /  ALT  24  /  AlkPhos  89  12-03    ___________________________________________________________________________    MEDICATIONS  (STANDING):  gabapentin 600 milliGRAM(s) Oral every 8 hours  lidocaine   4% Patch 1 Patch Transdermal every 24 hours  metoprolol tartrate 50 milliGRAM(s) Oral two times a day  pantoprazole    Tablet 40 milliGRAM(s) Oral before breakfast  polyethylene glycol 3350 17 Gram(s) Oral two times a day  rivaroxaban 20 milliGRAM(s) Oral daily  rosuvastatin 10 milliGRAM(s) Oral at bedtime  senna 2 Tablet(s) Oral at bedtime  triamterene 37.5 mG/hydrochlorothiazide 25 mG Tablet 1 Tablet(s) Oral daily    MEDICATIONS  (PRN):  acetaminophen     Tablet .. 650 milliGRAM(s) Oral every 6 hours PRN Temp greater or equal to 38C (100.4F), Mild Pain (1 - 3)  methocarbamol 500 milliGRAM(s) Oral every 8 hours PRN Muscle Spasm  traMADol 50 milliGRAM(s) Oral every 6 hours PRN Moderate Pain (4 - 6)    ___________________________________________________________________________    PHYSICAL EXAM:  Vital Signs Last 24 Hrs  T(C): 37.2 (08 Dec 2024 20:05), Max: 37.2 (08 Dec 2024 20:05)  T(F): 98.9 (08 Dec 2024 20:05), Max: 98.9 (08 Dec 2024 20:05)  HR: 73 (09 Dec 2024 05:44) (61 - 77)  BP: 132/74 (09 Dec 2024 05:44) (104/70 - 146/76)  RR: 15 (08 Dec 2024 20:05) (15 - 15)  SpO2: 96% (08 Dec 2024 20:05) (93% - 96%)    Gen - NAD, Comfortable  HEENT - NCAT, EOMI, MMM, PERRLA, Normal Conjunctivae  Pulm - CTAB, No wheeze, No rhonchi, No crackles  Cardiovascular - RRR, S1S2, No murmurs  Chest - good chest expansion, good respiratory effort  Abdomen - Soft, NT/ND, +BS  Extremities - No C/C, + B/L calf tenderness, + B/L ankle and pedal edema   Neuro-     Cognitive - awake, alert, oriented to person, place, date, year, and situation.  Able  to follow command     Motor -                     LEFT    UE - ShAB 5/5, EF 5/5, EE 5/5,  5/5                    RIGHT UE - ShAB 5/5, EF 5/5, EE 5/5,   5/5                    LEFT    LE - HF 2/5, KE 2/5, DF 4/5, PF 5/5                    RIGHT LE - HF 1/5, KE 2/5, DF 2/5, PF 5/5        Sensory - Intact  to LT      Coordination - FTN Intact, Unable to HTS 2/2 BLE weakness     Tone - Normal  MSK: Soreness across R side abd, R anterior thigh, R ankle   Psychiatric - Mood stable, Affect WNL  Skin:  ST L groin 4.5x1.5cm, 2.5x0.5cm ST mid upper back, 26cm mid thoracic/lumbar incision, scabbed, CDI w/ 3 drain sites, L gluteal blanchable redness with localized pustules, BLUE ecchymosis (R>L).      ___________________________________________________________________________ HPI:  Mrs. Ct Levine 78 y/o F w/ HTN, HLD, Afib atrial flutter, sick sinus syndrome/heart block, atrial flutter s/p cardiac ablation x1 ( 2016), PPM dependent dual chamber status (2013), breast augmentation, and chronic lower back pain who presented to [facility] on 11/20 w/c/o non radiating lower back pain, BLE weakness (R>L) and difficulty ambulating. Surgical management was recommended and she is s/p T11-pelvis instrumentation and fusion; L4-L5, L5-S1 TLIFs, T12-L1, L1-L2, L2-L3, L3-L4 posterior column osteotomies for coronal scoliosis deformity correction with Dr. Allen on 11/20. Post op course c/b post op anemia requiring multiple pRBC, airway edema s/p extubation, treated w/ steroids, dysphagia, persistent R sided weakness/BLE w/paresthesias post op (XR stable, MRI stable posterior fusion, no spinal cord compression; self limiting). Patient was evaluated by PM&R and therapy for functional deficits, gait/ADL impairments and acute rehabilitation was recommended. Patient was cleared for discharge to [facility] IRF on 12/2/24. (02 Dec 2024 13:13)    TDD: 12/18/24 to Home  ___________________________________________________________________________    SUBJECTIVE/ROS  Patient was seen and evaluated at bedside today.  Reported no overnight events and is in no acute distress.  Neuropathic pain in dermatomal distribution unchanged. Gabapentin increased to 800mg q8h.  Patient is eager to continue participation on the recommended rehabilitation program.  Denies any CP, SOB, REA, palpitations, fever, chills, body aches, cough, congestion, or any other symptoms at this time.   ___________________________________________________________________________    IMAGING (per official Radiology reports)    CT THORACIC SPINE& CT LUMBAR SPINE  PROCEDURE DATE:  12/08/2024   IMPRESSION: Status post fusion spanning T11 through the pelvis with intact hardware. New mildly displaced right L3 transverse process fracture. Otherwise no acute fracture.    ___________________________________________________________________________    LAB                        11.6   3.78  )-----------( 392      ( 09 Dec 2024 07:05 )             34.5                           11.8   7.19  )-----------( 425      ( 05 Dec 2024 08:45 )             35.5       12-09    133[L]  |  96  |  10  ----------------------------<  109[H]  3.6   |  30  |  0.67    Ca    9.2      09 Dec 2024 07:05    TPro  7.0  /  Alb  2.7[L]  /  TBili  0.5  /  DBili  x   /  AST  28  /  ALT  30  /  AlkPhos  104  12-09    LIVER FUNCTIONS - ( 09 Dec 2024 07:05 )  Alb: 2.7 g/dL / Pro: 7.0 g/dL / ALK PHOS: 104 U/L / ALT: 30 U/L / AST: 28 U/L / GGT: x           ___________________________________________________________________________    MEDICATIONS  (STANDING):  gabapentin 600 milliGRAM(s) Oral every 8 hours  lidocaine   4% Patch 1 Patch Transdermal every 24 hours  metoprolol tartrate 50 milliGRAM(s) Oral two times a day  pantoprazole    Tablet 40 milliGRAM(s) Oral before breakfast  polyethylene glycol 3350 17 Gram(s) Oral two times a day  rivaroxaban 20 milliGRAM(s) Oral daily  rosuvastatin 10 milliGRAM(s) Oral at bedtime  senna 2 Tablet(s) Oral at bedtime  triamterene 37.5 mG/hydrochlorothiazide 25 mG Tablet 1 Tablet(s) Oral daily    MEDICATIONS  (PRN):  acetaminophen     Tablet .. 650 milliGRAM(s) Oral every 6 hours PRN Temp greater or equal to 38C (100.4F), Mild Pain (1 - 3)  methocarbamol 500 milliGRAM(s) Oral every 8 hours PRN Muscle Spasm  traMADol 50 milliGRAM(s) Oral every 6 hours PRN Moderate Pain (4 - 6)    ___________________________________________________________________________    PHYSICAL EXAM:  Vital Signs Last 24 Hrs  T(C): 36.9 (09 Dec 2024 07:13), Max: 37.2 (08 Dec 2024 20:05)  T(F): 98.5 (09 Dec 2024 07:13), Max: 98.9 (08 Dec 2024 20:05)  HR: 61 (09 Dec 2024 07:13) (61 - 73)  BP: 110/72 (09 Dec 2024 07:13) (104/70 - 132/74)  RR: 14 (09 Dec 2024 07:13) (14 - 15)  SpO2: 96% (09 Dec 2024 07:13) (96% - 96%)    Gen - NAD, Comfortable  HEENT - NCAT, EOMI, MMM, PERRLA, Normal Conjunctivae  Pulm - CTAB, No wheeze, No rhonchi, No crackles  Cardiovascular - RRR, S1S2, No murmurs  Chest - good chest expansion, good respiratory effort  Abdomen - Soft, NT/ND, +BS  Extremities - No C/C, + B/L calf tenderness, + B/L ankle and pedal edema   Neuro-     Cognitive - awake, alert, oriented to person, place, date, year, and situation.  Able  to follow command     Motor -                     LEFT    UE - ShAB 5/5, EF 5/5, EE 5/5,  5/5                    RIGHT UE - ShAB 5/5, EF 5/5, EE 5/5,   5/5                    LEFT    LE - HF 2/5, KE 2/5, DF 4/5, PF 5/5                    RIGHT LE - HF 1/5, KE 2/5, DF 2/5, PF 5/5        Sensory - Intact  to LT      Coordination - FTN Intact, Unable to HTS 2/2 BLE weakness     Tone - Normal  MSK: Soreness across R side abd, R anterior thigh, R ankle   Psychiatric - Mood stable, Affect WNL  Skin:  ST L groin 4.5x1.5cm, 2.5x0.5cm ST mid upper back, 26cm mid thoracic/lumbar incision, scabbed, CDI w/ 3 drain sites, L gluteal blanchable redness with localized pustules, BLUE ecchymosis (R>L).      ___________________________________________________________________________

## 2024-12-09 NOTE — PROGRESS NOTE ADULT - ASSESSMENT
78 y/o F w/ HTN, HLD, Afib atrial flutter, sick sinus syndrome/heart block, atrial flutter s/p cardiac ablation x1 ( 2016), PPM dependent dual chamber status (2013), breast augmentation, and chronic lower back pain who presented to [facility] on 11/20 w/c/o non radiating lower back pain, BLE weakness (R>L) and difficulty ambulating. Surgical management was recommended and she is s/p T11-pelvis instrumentation and fusion; L4-L5, L5-S1 TLIFs, T12-L1, L1-L2, L2-L3, L3-L4 posterior column osteotomies for coronal scoliosis deformity correction with Dr. Allen on 11/20. Post op course c/b post op anemia requiring multiple pRBC, airway edema s/p extubation, treated w/ steroids, dysphagia, persistent R sided weakness/BLE w/paresthesias post op (XR stable, MRI stable posterior fusion, no spinal cord compression; self limiting). Patient was evaluated by PM&R and therapy for functional deficits, gait/ADL impairments and acute rehabilitation was recommended. Patient was cleared for discharge to [facility] IRF on 12/2/24.      #Severe kyphoscoliosis of the thoracolumbar spine with trunk shift and severe stenosis at L4-5 s/p surgery  - S/P instrumentation and fusion surgery by Dr. Cox (11/20/2024)  - Pain management per primary team  - Comprehensive Multidisciplinary Rehab Program:    #mechanical fall  - 12/8: was being assisted to the bathroom with PCA when right leg buckled and patient fell  - No LOC, headstrike. Denies CP, SOB, palpitations, n/v/headaches. VSS.   - f/u CT Thoracic/lumbar to rule out acute changes    #PAF  #PPM  - PPM [employer], interrogated on 11/25 for MRI - shows measured data WNL, normal pacemaker function, NOT dependent.  - 11/22 TTE: LV systolic fxn normal w/ EF 64%, normal RV systolic fxn, mild to modr TR  - Metoprolol   - Xarelto     #Post op anemia (11/22)   - S/P 5U rRBC- lowest hgb 6.3 - 11/22  - S/P 3U FFP   - Monitor H/H  - Transfuse Hgb <7 PRN    #Radial artery occulusion RUE   - 11/22 LED negative  - 11/23 RUE Art Duplex: Right radial artery demonstrates diminished flow velocity at the mid forearm and is occluded at distal forearm. There is retrograde right radial artery flow at the wrist. Surrounding soft tissue edema is noted.  Antegrade flow is noted of the right ulnar artery. Patient's right arm is bruised however is warm, skin is pink, 1+pulse with good capillary refills, discussed results with patient.   - 11/23 and 11/25 RUE/LUE Dopp: negative for upper ext DVT.   - Monitor for s/s of ischemia     #HTN/HLD  - [id number]/[id number] daily  - monitor for OH  - Rosuvastatin    # Moderate protein-calorie malnutrition.  RD following    #GI ppx: Pantoprazole     #DVT ppx:  Xarelto     will follow  d/w rehab team

## 2024-12-09 NOTE — PROGRESS NOTE ADULT - SUBJECTIVE AND OBJECTIVE BOX
79y old  Female who presents with a chief complaint of Severe kyphoscoliosis of the thoracolumbar spine with trunk shift and severe stenosis at L4-5 s/p surgery       Pt seen and examined at bedside in AM.  Denies fever, chills, CP, SOB, palpitations, n/v/ headaches.   no overnight events.     Vital Signs Last 24 Hrs  T(C): 36.9 (09 Dec 2024 07:13), Max: 37.2 (08 Dec 2024 20:05)  T(F): 98.5 (09 Dec 2024 07:13), Max: 98.9 (08 Dec 2024 20:05)  HR: 61 (09 Dec 2024 07:13) (61 - 73)  BP: 110/72 (09 Dec 2024 07:13) (104/70 - 132/74)  BP(mean): --  RR: 14 (09 Dec 2024 07:13) (14 - 15)  SpO2: 96% (09 Dec 2024 07:13) (96% - 96%)    Parameters below as of 09 Dec 2024 07:13  Patient On (Oxygen Delivery Method): room air    GENERAL- NAD  EAR/NOSE/MOUTH/THROAT -  MMM  EYES- JEROME, conjunctiva and Sclera clear  NECK- supple  RESPIRATORY-  clear to auscultation bilaterally  CARDIOVASCULAR - SIS2, RRR  GI - soft NT BS present  EXTREMITIES- no pedal edema  NEUROLOGY- no gross focal deficits  PSYCHIATRY- AAO X 3                11.6                 133  | 30   | 10           3.78  >-----------< 392     ------------------------< 109                   34.5                 3.6  | 96   | 0.67                                         Ca 9.2   Mg x     Ph x        MEDICATIONS  (STANDING):  gabapentin 600 milliGRAM(s) Oral every 8 hours  lidocaine   4% Patch 1 Patch Transdermal every 24 hours  metoprolol tartrate 50 milliGRAM(s) Oral two times a day  pantoprazole    Tablet 40 milliGRAM(s) Oral before breakfast  polyethylene glycol 3350 17 Gram(s) Oral two times a day  rivaroxaban 20 milliGRAM(s) Oral daily  rosuvastatin 10 milliGRAM(s) Oral at bedtime  senna 2 Tablet(s) Oral at bedtime  triamterene 37.5 mG/hydrochlorothiazide 25 mG Tablet 1 Tablet(s) Oral daily    MEDICATIONS  (PRN):  acetaminophen     Tablet .. 650 milliGRAM(s) Oral every 6 hours PRN Temp greater or equal to 38C (100.4F), Mild Pain (1 - 3)  methocarbamol 500 milliGRAM(s) Oral every 8 hours PRN Muscle Spasm  traMADol 50 milliGRAM(s) Oral every 6 hours PRN Moderate Pain (4 - 6)

## 2024-12-10 ENCOUNTER — TRANSCRIPTION ENCOUNTER (OUTPATIENT)
Age: 79
End: 2024-12-10

## 2024-12-10 PROCEDURE — 99232 SBSQ HOSP IP/OBS MODERATE 35: CPT

## 2024-12-10 RX ADMIN — TRAMADOL HYDROCHLORIDE 50 MILLIGRAM(S): 300 CAPSULE ORAL at 08:35

## 2024-12-10 RX ADMIN — PANTOPRAZOLE SODIUM 40 MILLIGRAM(S): 40 TABLET, DELAYED RELEASE ORAL at 06:12

## 2024-12-10 RX ADMIN — LIDOCAINE 1 PATCH: 40 CREAM TOPICAL at 22:11

## 2024-12-10 RX ADMIN — LIDOCAINE 1 PATCH: 40 CREAM TOPICAL at 09:00

## 2024-12-10 RX ADMIN — TRAMADOL HYDROCHLORIDE 50 MILLIGRAM(S): 300 CAPSULE ORAL at 13:57

## 2024-12-10 RX ADMIN — TRAMADOL HYDROCHLORIDE 50 MILLIGRAM(S): 300 CAPSULE ORAL at 14:30

## 2024-12-10 RX ADMIN — TRIAMTERENE AND HYDROCHLOROTHIAZIDE 1 TABLET(S): 25; 37.5 CAPSULE ORAL at 06:13

## 2024-12-10 RX ADMIN — GABAPENTIN 800 MILLIGRAM(S): 300 CAPSULE ORAL at 06:11

## 2024-12-10 RX ADMIN — TRAMADOL HYDROCHLORIDE 50 MILLIGRAM(S): 300 CAPSULE ORAL at 07:52

## 2024-12-10 RX ADMIN — GABAPENTIN 800 MILLIGRAM(S): 300 CAPSULE ORAL at 13:58

## 2024-12-10 RX ADMIN — METOPROLOL TARTRATE 50 MILLIGRAM(S): 100 TABLET, FILM COATED ORAL at 06:12

## 2024-12-10 RX ADMIN — RIVAROXABAN 20 MILLIGRAM(S): 10 TABLET, FILM COATED ORAL at 17:31

## 2024-12-10 RX ADMIN — LIDOCAINE 1 PATCH: 40 CREAM TOPICAL at 07:12

## 2024-12-10 RX ADMIN — GABAPENTIN 800 MILLIGRAM(S): 300 CAPSULE ORAL at 22:10

## 2024-12-10 RX ADMIN — ROSUVASTATIN CALCIUM 10 MILLIGRAM(S): 5 TABLET, FILM COATED ORAL at 22:11

## 2024-12-10 NOTE — PROGRESS NOTE ADULT - SUBJECTIVE AND OBJECTIVE BOX
HPI:  Mrs. Ct Levine 78 y/o F w/ HTN, HLD, Afib atrial flutter, sick sinus syndrome/heart block, atrial flutter s/p cardiac ablation x1 ( 2016), PPM dependent dual chamber status (2013), breast augmentation, and chronic lower back pain who presented to [facility] on 11/20 w/c/o non radiating lower back pain, BLE weakness (R>L) and difficulty ambulating. Surgical management was recommended and she is s/p T11-pelvis instrumentation and fusion; L4-L5, L5-S1 TLIFs, T12-L1, L1-L2, L2-L3, L3-L4 posterior column osteotomies for coronal scoliosis deformity correction with Dr. Allen on 11/20. Post op course c/b post op anemia requiring multiple pRBC, airway edema s/p extubation, treated w/ steroids, dysphagia, persistent R sided weakness/BLE w/paresthesias post op (XR stable, MRI stable posterior fusion, no spinal cord compression; self limiting). Patient was evaluated by PM&R and therapy for functional deficits, gait/ADL impairments and acute rehabilitation was recommended. Patient was cleared for discharge to [facility] IRF on 12/2/24. (02 Dec 2024 13:13)    TDD: 12/18/24 to Home  ___________________________________________________________________________    SUBJECTIVE/ROS  Patient was seen and evaluated at bedside today.  Reported no overnight events and is in no acute distress.  Patient endorses new low back soreness, most prominent in left paraspinal lumbosacral region. She states the pain is not severe.  Eager to participate on the recommended rehabilitation program.  Denies any CP, SOB, REA, palpitations, fever, chills, body aches, cough, congestion, or any other symptoms at this time.   ___________________________________________________________________________    IMAGING (per official Radiology reports)    CT THORACIC SPINE& CT LUMBAR SPINE  PROCEDURE DATE:  12/08/2024   IMPRESSION: Status post fusion spanning T11 through the pelvis with intact hardware. New mildly displaced right L3 transverse process fracture. Otherwise no acute fracture.    ___________________________________________________________________________  VITALS  T(C): 36.7 (12-09-24 @ 21:00), Max: 36.7 (12-09-24 @ 21:00)  HR: 58 (12-10-24 @ 07:35) (58 - 74)  BP: 115/72 (12-10-24 @ 07:35) (115/72 - 135/76)  RR: 16 (12-10-24 @ 07:35) (16 - 16)  SpO2: 96% (12-10-24 @ 07:35) (94% - 96%)  ___________________________________________________________________________    LABS                          11.6   3.78  )-----------( 392      ( 09 Dec 2024 07:05 )             34.5       12-09    133[L]  |  96  |  10  ----------------------------<  109[H]  3.6   |  30  |  0.67    Ca    9.2      09 Dec 2024 07:05    TPro  7.0  /  Alb  2.7[L]  /  TBili  0.5  /  DBili  x   /  AST  28  /  ALT  30  /  AlkPhos  104  12-09          ___________________________________________________________________________    MEDICATIONS  (STANDING):  gabapentin 800 milliGRAM(s) Oral every 8 hours  lidocaine   4% Patch 1 Patch Transdermal every 24 hours  metoprolol tartrate 50 milliGRAM(s) Oral two times a day  pantoprazole    Tablet 40 milliGRAM(s) Oral before breakfast  polyethylene glycol 3350 17 Gram(s) Oral two times a day  rivaroxaban 20 milliGRAM(s) Oral daily  rosuvastatin 10 milliGRAM(s) Oral at bedtime  senna 2 Tablet(s) Oral at bedtime  triamterene 37.5 mG/hydrochlorothiazide 25 mG Tablet 1 Tablet(s) Oral daily    MEDICATIONS  (PRN):  acetaminophen     Tablet .. 650 milliGRAM(s) Oral every 6 hours PRN Temp greater or equal to 38C (100.4F), Mild Pain (1 - 3)  methocarbamol 500 milliGRAM(s) Oral every 8 hours PRN Muscle Spasm  traMADol 50 milliGRAM(s) Oral every 6 hours PRN Moderate Pain (4 - 6)    ___________________________________________________________________________    PHYSICAL EXAM:    Alert   Gen - NAD, Comfortable  NEURO-     Cognitive - awake, alert, oriented to person, place, date, year, and situation. Able to follow commands     Motor -                     LEFT    LE - HF 3/5, KE 4/5, DF 5/5, PF 5/5                    RIGHT LE - HF 2/5, KE 2/5, DF 4/5, PF 5/5        Sensory - Intact  to LT      Tone - Normal  MSK - Mild tenderness noted in L lumbar paraspinal region  PSYCH - Mood stable, positive affect  SKIN - ST L groin 4.5x1.5cm, 2.5x0.5cm ST mid upper back, 26cm mid thoracic/lumbar incision, scabbed. No surrounding erythema or induration  ___________________________________________________________________________ HPI:  Mrs. Ct Levine 80 y/o F w/ HTN, HLD, Afib atrial flutter, sick sinus syndrome/heart block, atrial flutter s/p cardiac ablation x1 ( 2016), PPM dependent dual chamber status (2013), breast augmentation, and chronic lower back pain who presented to [facility] on 11/20 w/c/o non radiating lower back pain, BLE weakness (R>L) and difficulty ambulating. Surgical management was recommended and she is s/p T11-pelvis instrumentation and fusion; L4-L5, L5-S1 TLIFs, T12-L1, L1-L2, L2-L3, L3-L4 posterior column osteotomies for coronal scoliosis deformity correction with Dr. Allen on 11/20. Post op course c/b post op anemia requiring multiple pRBC, airway edema s/p extubation, treated w/ steroids, dysphagia, persistent R sided weakness/BLE w/paresthesias post op (XR stable, MRI stable posterior fusion, no spinal cord compression; self limiting). Patient was evaluated by PM&R and therapy for functional deficits, gait/ADL impairments and acute rehabilitation was recommended. Patient was cleared for discharge to [facility] IRF on 12/2/24. (02 Dec 2024 13:13)    TDD: 12/18/24 to Home  ___________________________________________________________________________    SUBJECTIVE/ROS  Patient was seen and evaluated at bedside today.  Reported no overnight events and is in no acute distress.  Patient endorses new low back soreness, most prominent in left paraspinal lumbosacral region. She states the pain is not severe.  Eager to participate on the recommended rehabilitation program.  Denies any CP, SOB, REA, palpitations, fever, chills, body aches, cough, congestion, or any other symptoms at this time.   ___________________________________________________________________________    IMAGING (per official Radiology reports)    CT THORACIC SPINE& CT LUMBAR SPINE  PROCEDURE DATE:  12/08/2024   IMPRESSION: Status post fusion spanning T11 through the pelvis with intact hardware. New mildly displaced right L3 transverse process fracture. Otherwise no acute fracture.    ___________________________________________________________________________    LABS                          11.6   3.78  )-----------( 392      ( 09 Dec 2024 07:05 )             34.5       12-09    133[L]  |  96  |  10  ----------------------------<  109[H]  3.6   |  30  |  0.67    Ca    9.2      09 Dec 2024 07:05    TPro  7.0  /  Alb  2.7[L]  /  TBili  0.5  /  DBili  x   /  AST  28  /  ALT  30  /  AlkPhos  104  12-09    ___________________________________________________________________________    MEDICATIONS  (STANDING):  gabapentin 800 milliGRAM(s) Oral every 8 hours  lidocaine   4% Patch 1 Patch Transdermal every 24 hours  metoprolol tartrate 50 milliGRAM(s) Oral two times a day  pantoprazole    Tablet 40 milliGRAM(s) Oral before breakfast  polyethylene glycol 3350 17 Gram(s) Oral two times a day  rivaroxaban 20 milliGRAM(s) Oral daily  rosuvastatin 10 milliGRAM(s) Oral at bedtime  senna 2 Tablet(s) Oral at bedtime  triamterene 37.5 mG/hydrochlorothiazide 25 mG Tablet 1 Tablet(s) Oral daily    MEDICATIONS  (PRN):  acetaminophen     Tablet .. 650 milliGRAM(s) Oral every 6 hours PRN Temp greater or equal to 38C (100.4F), Mild Pain (1 - 3)  methocarbamol 500 milliGRAM(s) Oral every 8 hours PRN Muscle Spasm  traMADol 50 milliGRAM(s) Oral every 6 hours PRN Moderate Pain (4 - 6)    ___________________________________________________________________________    PHYSICAL EXAM:  Vital Signs Last 24 Hrs  T(C): 36.7 (09 Dec 2024 21:00), Max: 36.7 (09 Dec 2024 21:00)  T(F): 98.1 (09 Dec 2024 21:00), Max: 98.1 (09 Dec 2024 21:00)  HR: 58 (10 Dec 2024 07:35) (58 - 74)  BP: 115/72 (10 Dec 2024 07:35) (115/72 - 135/76)  RR: 16 (10 Dec 2024 07:35) (16 - 16)  SpO2: 96% (10 Dec 2024 07:35) (94% - 96%)    Alert   Gen - NAD, Comfortable  NEURO-     Cognitive - awake, alert, oriented to person, place, date, year, and situation. Able to follow commands     Motor -                     LEFT    LE - HF 3/5, KE 4/5, DF 5/5, PF 5/5                    RIGHT LE - HF 2/5, KE 2/5, DF 4/5, PF 5/5        Sensory - Intact  to LT      Tone - Normal  MSK - Mild tenderness noted in L lumbar paraspinal region  PSYCH - Mood stable, positive affect  SKIN - ST L groin 4.5x1.5cm, 2.5x0.5cm ST mid upper back, 26cm mid thoracic/lumbar incision, scabbed. No surrounding erythema or induration  ___________________________________________________________________________ HPI:  Mrs. Ct Levine 78 y/o F w/ HTN, HLD, Afib atrial flutter, sick sinus syndrome/heart block, atrial flutter s/p cardiac ablation x1 ( 2016), PPM dependent dual chamber status (2013), breast augmentation, and chronic lower back pain who presented to [facility] on 11/20 w/c/o non radiating lower back pain, BLE weakness (R>L) and difficulty ambulating. Surgical management was recommended and she is s/p T11-pelvis instrumentation and fusion; L4-L5, L5-S1 TLIFs, T12-L1, L1-L2, L2-L3, L3-L4 posterior column osteotomies for coronal scoliosis deformity correction with Dr. Allen on 11/20. Post op course c/b post op anemia requiring multiple pRBC, airway edema s/p extubation, treated w/ steroids, dysphagia, persistent R sided weakness/BLE w/paresthesias post op (XR stable, MRI stable posterior fusion, no spinal cord compression; self limiting). Patient was evaluated by PM&R and therapy for functional deficits, gait/ADL impairments and acute rehabilitation was recommended. Patient was cleared for discharge to [facility] IRF on 12/2/24. (02 Dec 2024 13:13)    TDD: 12/14/24 to Home  ___________________________________________________________________________    SUBJECTIVE/ROS  Patient was seen and evaluated at bedside today.  Reported no overnight events and is in no acute distress.  Patient endorses new low back soreness, most prominent in left paraspinal lumbosacral region. She states the pain is not severe.  Changes to the tentative discharge date as outlined above per family request.  Patient is eager to continue participation on the recommended rehabilitation program.  Denies any CP, SOB, REA, palpitations, fever, chills, body aches, cough, congestion, or any other symptoms at this time.   ___________________________________________________________________________    IMAGING (per official Radiology reports)    CT THORACIC SPINE& CT LUMBAR SPINE  PROCEDURE DATE:  12/08/2024   IMPRESSION: Status post fusion spanning T11 through the pelvis with intact hardware. New mildly displaced right L3 transverse process fracture. Otherwise no acute fracture.    ___________________________________________________________________________    LABS                          11.6   3.78  )-----------( 392      ( 09 Dec 2024 07:05 )             34.5       12-09    133[L]  |  96  |  10  ----------------------------<  109[H]  3.6   |  30  |  0.67    Ca    9.2      09 Dec 2024 07:05    TPro  7.0  /  Alb  2.7[L]  /  TBili  0.5  /  DBili  x   /  AST  28  /  ALT  30  /  AlkPhos  104  12-09    ___________________________________________________________________________    MEDICATIONS  (STANDING):  gabapentin 800 milliGRAM(s) Oral every 8 hours  lidocaine   4% Patch 1 Patch Transdermal every 24 hours  metoprolol tartrate 50 milliGRAM(s) Oral two times a day  pantoprazole    Tablet 40 milliGRAM(s) Oral before breakfast  polyethylene glycol 3350 17 Gram(s) Oral two times a day  rivaroxaban 20 milliGRAM(s) Oral daily  rosuvastatin 10 milliGRAM(s) Oral at bedtime  senna 2 Tablet(s) Oral at bedtime  triamterene 37.5 mG/hydrochlorothiazide 25 mG Tablet 1 Tablet(s) Oral daily    MEDICATIONS  (PRN):  acetaminophen     Tablet .. 650 milliGRAM(s) Oral every 6 hours PRN Temp greater or equal to 38C (100.4F), Mild Pain (1 - 3)  methocarbamol 500 milliGRAM(s) Oral every 8 hours PRN Muscle Spasm  traMADol 50 milliGRAM(s) Oral every 6 hours PRN Moderate Pain (4 - 6)    ___________________________________________________________________________    PHYSICAL EXAM:  Vital Signs Last 24 Hrs  T(C): 36.7 (09 Dec 2024 21:00), Max: 36.7 (09 Dec 2024 21:00)  T(F): 98.1 (09 Dec 2024 21:00), Max: 98.1 (09 Dec 2024 21:00)  HR: 58 (10 Dec 2024 07:35) (58 - 74)  BP: 115/72 (10 Dec 2024 07:35) (115/72 - 135/76)  RR: 16 (10 Dec 2024 07:35) (16 - 16)  SpO2: 96% (10 Dec 2024 07:35) (94% - 96%)    Alert   Gen - NAD, Comfortable  NEURO-     Cognitive - awake, alert, oriented to person, place, date, year, and situation. Able to follow commands     Motor -                     LEFT    LE - HF 3/5, KE 4/5, DF 5/5, PF 5/5                    RIGHT LE - HF 2/5, KE 2/5, DF 4/5, PF 5/5        Sensory - Intact  to LT      Tone - Normal  MSK - Mild tenderness noted in L lumbar paraspinal region  PSYCH - Mood stable, positive affect  SKIN - ST L groin 4.5x1.5cm, 2.5x0.5cm ST mid upper back, 26cm mid thoracic/lumbar incision, scabbed. No surrounding erythema or induration  ___________________________________________________________________________

## 2024-12-10 NOTE — DISCHARGE NOTE PROVIDER - NSDCMRMEDTOKEN_GEN_ALL_CORE_FT
acetaminophen 325 mg oral tablet: 2 tab(s) orally every 6 hours As needed Temp greater or equal to 38C (100.4F), Mild Pain (1 - 3)  bisacodyl 10 mg rectal suppository: 1 suppository(ies) rectal once a day  Crestor 10 mg oral tablet: 1 tab(s) orally once a day (at bedtime)  enoxaparin: 40 milligram(s) subcutaneous once a day (at bedtime)  gabapentin 400 mg oral capsule: 1 cap(s) orally once a day (in the morning)  gabapentin 600 mg oral tablet: 1 tab(s) orally once a day (at bedtime)  methocarbamol 500 mg oral tablet: 1 tab(s) orally every 8 hours As needed Muscle Spasm  metoprolol tartrate 50 mg oral tablet: 1 tab(s) orally 2 times a day  pantoprazole 40 mg oral delayed release tablet: 1 tab(s) orally once a day (before a meal)  Patient is s/p T1-Pelvic Fusion: For when OOB  polyethylene glycol 3350 oral powder for reconstitution: 17 gram(s) orally 2 times a day  senna leaf extract oral tablet: 2 tab(s) orally once a day (at bedtime)  traMADol 50 mg oral tablet: 0.5 tab(s) orally every 6 hours As needed Moderate Pain (4 - 6)  triamterene-hydrochlorothiazide 37.5 mg-25 mg oral capsule: 1 cap(s) orally once a day   acetaminophen 325 mg oral tablet: 2 tab(s) orally every 6 hours As needed Temp greater or equal to 38C (100.4F), Mild Pain (1 - 3)  lidocaine 4% topical film: Apply topically to affected area once a day 12 hours on and 12 hours off  methocarbamol 500 mg oral tablet: 1 tab(s) orally every 8 hours as needed for Muscle Spasm  metoprolol tartrate 50 mg oral tablet: 1 tab(s) orally 2 times a day  pantoprazole 40 mg oral delayed release tablet: 1 tab(s) orally once a day (before a meal)  Patient is s/p T1-Pelvic Fusion: For when OOB  polyethylene glycol 3350 oral powder for reconstitution: 17 gram(s) orally 2 times a day  pregabalin 75 mg oral capsule: 1 cap(s) orally 2 times a day MDD: 150mg  rivaroxaban 20 mg oral tablet: 1 tab(s) orally once a day  rosuvastatin 10 mg oral tablet: 1 tab(s) orally once a day (at bedtime)  senna leaf extract oral tablet: 2 tab(s) orally once a day (at bedtime)  traMADol 50 mg oral tablet: 1 tab(s) orally every 6 hours as needed for Moderate Pain (4 - 6) MDD: 200mg

## 2024-12-10 NOTE — DISCHARGE NOTE PROVIDER - PROVIDER TOKENS
PROVIDER:[TOKEN:[[id number]],FOLLOWUP:[2 weeks]],PROVIDER:[TOKEN:[[id number]],FOLLOWUP:[2 weeks]],PROVIDER:[TOKEN:[[id number]],FOLLOWUP:[2 months]]

## 2024-12-10 NOTE — PROGRESS NOTE ADULT - SUBJECTIVE AND OBJECTIVE BOX
79y old  Female who presents with a chief complaint of Severe kyphoscoliosis of the thoracolumbar spine with trunk shift and severe stenosis at L4-5 s/p surgery       Pt seen and examined at bedside in AM.  c/o mild back discomfort, Denies fever, chills, CP, SOB, palpitations, n/v/ headaches.   no overnight events.     Vital Signs Last 24 Hrs  T(C): 36.7 (09 Dec 2024 21:00), Max: 36.7 (09 Dec 2024 21:00)  T(F): 98.1 (09 Dec 2024 21:00), Max: 98.1 (09 Dec 2024 21:00)  HR: 58 (10 Dec 2024 07:35) (58 - 74)  BP: 115/72 (10 Dec 2024 07:35) (115/72 - 135/76)  BP(mean): --  RR: 16 (10 Dec 2024 07:35) (16 - 16)  SpO2: 96% (10 Dec 2024 07:35) (94% - 96%)    Parameters below as of 10 Dec 2024 07:35  Patient On (Oxygen Delivery Method): room air        GENERAL- NAD  EAR/NOSE/MOUTH/THROAT -  MMM  EYES- JEROME, conjunctiva and Sclera clear  NECK- supple  RESPIRATORY-  clear to auscultation bilaterally  CARDIOVASCULAR - SIS2, RRR  GI - soft NT BS present  EXTREMITIES- no pedal edema  NEUROLOGY- no gross focal deficits  PSYCHIATRY- AAO X 3                  11.6                 133  | 30   | 10           3.78  >-----------< 392     ------------------------< 109                   34.5                 3.6  | 96   | 0.67                                         Ca 9.2   Mg x     Ph x        MEDICATIONS  (STANDING):  gabapentin 800 milliGRAM(s) Oral every 8 hours  lidocaine   4% Patch 1 Patch Transdermal every 24 hours  metoprolol tartrate 50 milliGRAM(s) Oral two times a day  pantoprazole    Tablet 40 milliGRAM(s) Oral before breakfast  polyethylene glycol 3350 17 Gram(s) Oral two times a day  rivaroxaban 20 milliGRAM(s) Oral daily  rosuvastatin 10 milliGRAM(s) Oral at bedtime  senna 2 Tablet(s) Oral at bedtime  triamterene 37.5 mG/hydrochlorothiazide 25 mG Tablet 1 Tablet(s) Oral daily    MEDICATIONS  (PRN):  acetaminophen     Tablet .. 650 milliGRAM(s) Oral every 6 hours PRN Temp greater or equal to 38C (100.4F), Mild Pain (1 - 3)  methocarbamol 500 milliGRAM(s) Oral every 8 hours PRN Muscle Spasm  traMADol 50 milliGRAM(s) Oral every 6 hours PRN Moderate Pain (4 - 6)

## 2024-12-10 NOTE — PROGRESS NOTE ADULT - ASSESSMENT
Assessment/Plan:  Mrs. Ct Levine 80 y/o F w/ HTN, HLD, Afib atrial flutter, sick sinus syndrome/heart block, atrial flutter s/p cardiac ablation x1 ( 2016), PPM dependent dual chamber status (2013), breast augmentation, and chronic lower back pain who presented to [facility] on 11/20 w/c/o non radiating lower back pain, BLE weakness (R>L) and difficulty ambulating. Surgical management was recommended and she is s/p T11-pelvis instrumentation and fusion; L4-L5, L5-S1 TLIFs, T12-L1, L1-L2, L2-L3, L3-L4 posterior column osteotomies for coronal scoliosis deformity correction with Dr. Allen on 11/20. Post op course c/b post op anemia requiring multiple pRBC, airway edema s/p extubation, treated w/ steroids, dysphagia, persistent R sided weakness/BLE w/paresthesias post op (XR stable, MRI stable posterior fusion, no spinal cord compression; self limiting). Patient was evaluated by PM&R and therapy for functional deficits, gait/ADL impairments and acute rehabilitation was recommended. Patient was cleared for discharge to [facility] IRF on 12/2/24.    #Severe kyphoscoliosis of the thoracolumbar spine with trunk shift and severe stenosis at L4-5 s/p surgery  - S/P  instrumentation and fusion surgery by Dr. Cox (11/20/2024)      * T11-pelvis instrumentation and fusion      * L4-L5, L5-S1 TLIFs      * T12-L1, L1-L2, L2-L3, L3-L4 posterior column osteotomies for coronal scoliosis deformity correction      * Pain management: Tylenol PRN, Robaxin 500mg TID PRN, Tramadol 25mg q6h PRN, Gabapentin 800mg TID      * TLSO on ambulation  - Follow-up T/L spine MRI (11/25/24)      * Redemonstration of posterior fusion of T11-S1, bilateral sacroiliac fusion and multilevel facetectomies      * No gross evidence for abnormal intrinsic cord signal. No spinal cord compression, no significant spinal canal stenosis in the thoracic region.       * Expected post-op edema throughout the posterior paraspinal soft tissue at the levels of the surgery  - Deficits: Post op persistent BLE weakness/paresthesias   - Comprehensive Multidisciplinary Rehab Program:      * 3 hours a day, 5 days a week.      * PT 2hr/day: Focused on improving strength, endurance, coordination, balance, functional mobility, and transfers      * OT 1hr/day: Focused on improving strength, fine motor skills, coordination, posture and ADLs.    - Activity Limitations: Decreased social, vocational and leisure activities, decreased self care and ADLs, decreased mobility, decreased ability to manage household and finances.     -----------------------------------------------------------------------------------  Concurrent Medical Problems    #Airway edema s/p extubation  - S/P dexamethasone treatment     #PAF  #PPM  - PPM [employer], interrogated on 11/25 for MRI - shows measured data WNL, normal pacemaker function, NOT dependent.  - 11/22 TTE: LV systolic fxn normal w/ EF 64%, normal RV systolic fxn, mild to modr TR  - Metoprolol 50mg BID  - Xarelto 20mg daily to resume on 12/9 --> Last dose Lovenox on 12/8    #Post op anemia (11/22)   - S/P 5U rRBC- lowest hgb 6.3 - 11/22  - S/P 3U FFP   - Monitor H/H (11.2/34.2 - 12/2)  - Transfuse Hgb <7 PRN    #RUE ecchymosis  #Radial artery occulusion RUE   - 11/22 LED negative  - 11/23 RUE Art Duplex: Right radial artery demonstrates diminished flow velocity at the mid forearm and is occluded at distal forearm. There is retrograde right radial artery flow at the wrist. Surrounding soft tissue edema is noted.   Antegrade flow is noted of the right ulnar artery. Patient's right arm is bruised however is warm, skin is pink, 1+pulse with good capillary refills, discussed results with patient.   - 11/23 and 11/25 RUE/LUE Dopp: negative for upper ext DVT.   - Monitor for s/s of ischemia     #HTN/HLD  - [id number]/IHEC15nl daily  - Rosuvastatin 10mg HS    #Mood/Cognition:  Neuropsychology consult PRN    #Sleep:   Maintain quiet hours and low stim environment.    #GI/Bowel:  Senna QHS, Miralax 17g BID  GI ppx: Pantoprazole 40mg daily     #/Bladder:   - PVR x1 on admission (SC if > 400)    #Skin/Pressure Injury Assessment:   - Skin assessment on admission: ST L groin 4.5x1.5cm, 2.5x0.5cm ST mid upper back, 26cm mid thoracic/lumbar incision, scabbed, CDI w/ 3 drain sites, L gluteal blanchable redness with localized pustules, BLUE ecchymosis (R>L)    #Diet  Current Diet: Regular diet  Nutrition consult     #Precautions / PROPHYLAXIS:   - Falls, Spinal  - ortho: Weight bearing status: WBAT, OOB w/ TLSO brace  - Lungs: Aspiration, Incentive Spirometer   - DVT ppx: Xarelto 20mg daily starting 12/9, SCDs  - BLE Duplex US r/o DVT on admission ---> b/l calf pain  - Pressure injury/Skin: OOB to Chair, PT/OT      ---------------  Code Status: FULL  Emergency Contact:    Outpatient Follow-up (Specialty/Name of physician):    Jony Allen  Neurosurgery  [address]  [place] [address]  Phone: [phone]  Fax: [phone]  Follow Up Time:     DREW AMAYA  [address], [address]  [place] [address]      --------------

## 2024-12-10 NOTE — DISCHARGE NOTE PROVIDER - NSDCCPCAREPLAN_GEN_ALL_CORE_FT
PRINCIPAL DISCHARGE DIAGNOSIS  Diagnosis: Kyphoscoliosis  Assessment and Plan of Treatment: You had spine deformities called kyphosis as well as spinal stenosis (spinal canal narrowing) in your lower back. You had surgery with Dr. Allen on 11/20/24 to help these issues. Please continue to use the TLSO brace whenever you walk. Please follow up with Dr. Allen within 2 weeks of discharge from the hospital. For pain control, you can continue to use lidocaine patches, gabapentin 800mg every 8 hours, Tylenol 650mg every 6 hours, methocarbamol (Robaxin, a muscle relaxant) 500mg every 8 hours as needed for muscle spasm pain, and tramadol 50mg every 6 hours as needed for moderate pain. While you are taking opioids such as tramadol, you have a greater risk of constipation. Therefore, we recommend that you also take stool softeners and laxatives such as Miralax while you are taking these medications to ensure regular bowel movements. Additionally, please do not take more than 4g of Tylenol within 24 hours.      SECONDARY DISCHARGE DIAGNOSES  Diagnosis: Control of atrial fibrillation with pacemaker  Assessment and Plan of Treatment: You have a heart arrhythmia called atrial fibrillation, for which you have a pacemaker. Pleasea continue to take metoprolol 50mg twice per day and Xarelto 20mg every day to help control the rate of your heart and to decrease risk of blood clot formation in your heart. Please follow up with your cardiologist for further management.    Diagnosis: Hypertension  Assessment and Plan of Treatment: You have high blood pressure, also known as hypertension. Please continue to take metoprolol 50mg twice per day and triamterene 37.5mg/hydrochlorothiazide 25mg every day. Please see your primary care provider or cardiologist for further care.    Diagnosis: Hyperlipidemia  Assessment and Plan of Treatment: You have high cholesterol, also known as hyperlipidemia. Please continue to take rosuvastatin 10mg every evening and follow up with your primary care provider or cardiologist for further management.     PRINCIPAL DISCHARGE DIAGNOSIS  Diagnosis: Kyphoscoliosis  Assessment and Plan of Treatment: You had spine deformities called kyphosis as well as spinal stenosis (spinal canal narrowing) in your lower back. You had surgery with Dr. Allen on 11/20/24 to help these issues. Please continue to use the TLSO brace whenever you walk. Please follow up with Dr. Allen within 2 weeks of discharge from the hospital. For pain control, you can continue to use lidocaine patches, Lyrica 75mg every 12 hours, Tylenol 650mg every 6 hours, methocarbamol (Robaxin, a muscle relaxant) 500mg every 8 hours as needed for muscle spasm pain, and tramadol 50mg every 6 hours as needed for moderate pain. While you are taking opioids such as tramadol, you have a greater risk of constipation. Therefore, we recommend that you also take stool softeners and laxatives such as Miralax while you are taking these medications to ensure regular bowel movements. Additionally, please do not take more than 4g of Tylenol within 24 hours.      SECONDARY DISCHARGE DIAGNOSES  Diagnosis: Control of atrial fibrillation with pacemaker  Assessment and Plan of Treatment: You have a heart arrhythmia called atrial fibrillation, for which you have a pacemaker. Pleasea continue to take metoprolol 50mg twice per day and Xarelto 20mg every day to help control the rate of your heart and to decrease risk of blood clot formation in your heart. Please follow up with your cardiologist for further management.    Diagnosis: Hypertension  Assessment and Plan of Treatment: You have high blood pressure, also known as hypertension. Please continue to take metoprolol 50mg twice per day and triamterene 37.5mg/hydrochlorothiazide 25mg every day. Please see your primary care provider or cardiologist for further care.    Diagnosis: Hyperlipidemia  Assessment and Plan of Treatment: You have high cholesterol, also known as hyperlipidemia. Please continue to take rosuvastatin 10mg every evening and follow up with your primary care provider or cardiologist for further management.

## 2024-12-10 NOTE — DISCHARGE NOTE PROVIDER - DETAILS OF MALNUTRITION DIAGNOSIS/DIAGNOSES
This patient has been assessed with a concern for Malnutrition and was treated during this hospitalization for the following Nutrition diagnosis/diagnoses:     -  12/03/2024: Moderate protein-calorie malnutrition

## 2024-12-10 NOTE — DISCHARGE NOTE PROVIDER - HOSPITAL COURSE
HPI:  Mrs. Ct Levine 80 y/o F w/ HTN, HLD, Afib atrial flutter, sick sinus syndrome/heart block, atrial flutter s/p cardiac ablation x1 ( 2016), PPM dependent dual chamber status (2013), breast augmentation, and chronic lower back pain who presented to [facility] on 11/20 w/c/o non radiating lower back pain, BLE weakness (R>L) and difficulty ambulating. Surgical management was recommended and she is s/p T11-pelvis instrumentation and fusion; L4-L5, L5-S1 TLIFs, T12-L1, L1-L2, L2-L3, L3-L4 posterior column osteotomies for coronal scoliosis deformity correction with Dr. Allen on 11/20. Post op course c/b post op anemia requiring multiple pRBC, airway edema s/p extubation, treated w/ steroids, dysphagia, persistent R sided weakness/BLE w/paresthesias post op (XR stable, MRI stable posterior fusion, no spinal cord compression; self limiting). Patient was evaluated by PM&R and therapy for functional deficits, gait/ADL impairments and acute rehabilitation was recommended. Patient was cleared for discharge to [facility] IRF on 12/2/24. (02 Dec 2024 13:13)      Rehab course significant for a fall resulting in a new right-sided transverse process fracture of L3; no intervention recommended by neurosurgery. She was transitioned from Lovenox back to Xarelto 20mg daily on 12/9/24..    All other medical co-morbidities were stable. Patient tolerated course of inpatient PT/OT/SLP rehab with significant improvements and met rehab goals prior to discharge. Patient was medically cleared on 12/14/24 for discharge to home. HPI:  Mrs. Ct Levine 78 y/o F w/ HTN, HLD, Afib atrial flutter, sick sinus syndrome/heart block, atrial flutter s/p cardiac ablation x1 ( 2016), PPM dependent dual chamber status (2013), breast augmentation, and chronic lower back pain who presented to [facility] on 11/20 w/c/o non radiating lower back pain, BLE weakness (R>L) and difficulty ambulating. Surgical management was recommended and she is s/p T11-pelvis instrumentation and fusion; L4-L5, L5-S1 TLIFs, T12-L1, L1-L2, L2-L3, L3-L4 posterior column osteotomies for coronal scoliosis deformity correction with Dr. Allen on 11/20. Post op course c/b post op anemia requiring multiple pRBC, airway edema s/p extubation, treated w/ steroids, dysphagia, persistent R sided weakness/BLE w/paresthesias post op (XR stable, MRI stable posterior fusion, no spinal cord compression; self limiting). Patient was evaluated by PM&R and therapy for functional deficits, gait/ADL impairments and acute rehabilitation was recommended. Patient was cleared for discharge to [facility] IRF on 12/2/24. (02 Dec 2024 13:13)      Rehab course significant for a fall resulting in a new right-sided transverse process fracture of L3; no intervention recommended by neurosurgery. She was transitioned from Lovenox back to Xarelto 20mg daily on 12/9/24.    All other medical co-morbidities were stable. Patient tolerated course of inpatient PT/OT/SLP rehab with significant improvements and met rehab goals prior to discharge. Patient was medically cleared on 12/14/24 for discharge to home.

## 2024-12-10 NOTE — DISCHARGE NOTE PROVIDER - NSDCDCMDCOMP_GEN_ALL_CORE
Include Location In Plan?: No
Detail Level: Generalized
Detail Level: Detailed
This document is complete and the patient is ready for discharge.

## 2024-12-10 NOTE — DISCHARGE NOTE PROVIDER - CARE PROVIDER_API CALL
Jony Allen  Neurosurgery  [address], [address]  [place] [address]  Phone: [phone]  Fax: [phone]  Follow Up Time: 2 weeks    DREW AMAYA  [address], [address]  [place] [address]  Phone: ()-  Fax: ()-  Follow Up Time: 2 weeks    Cory Heller  Physical/Rehab Medicine  [address] [place] [address]  Phone: [phone]  Fax: [phone]  Follow Up Time: 2 months

## 2024-12-10 NOTE — DISCHARGE NOTE PROVIDER - CARE PROVIDERS DIRECT ADDRESSES
,DirectAddress_Unknown,DirectAddress_Unknown,[email or web address]

## 2024-12-10 NOTE — PROGRESS NOTE ADULT - ASSESSMENT
80 y/o F w/ HTN, HLD, Afib atrial flutter, sick sinus syndrome/heart block, atrial flutter s/p cardiac ablation x1 ( 2016), PPM dependent dual chamber status (2013), breast augmentation, and chronic lower back pain who presented to [facility] on 11/20 w/c/o non radiating lower back pain, BLE weakness (R>L) and difficulty ambulating. Surgical management was recommended and she is s/p T11-pelvis instrumentation and fusion; L4-L5, L5-S1 TLIFs, T12-L1, L1-L2, L2-L3, L3-L4 posterior column osteotomies for coronal scoliosis deformity correction with Dr. Allen on 11/20. Post op course c/b post op anemia requiring multiple pRBC, airway edema s/p extubation, treated w/ steroids, dysphagia, persistent R sided weakness/BLE w/paresthesias post op (XR stable, MRI stable posterior fusion, no spinal cord compression; self limiting). Patient was evaluated by PM&R and therapy for functional deficits, gait/ADL impairments and acute rehabilitation was recommended. Patient was cleared for discharge to [facility] IRF on 12/2/24.      #Severe kyphoscoliosis of the thoracolumbar spine with trunk shift and severe stenosis at L4-5 s/p surgery  - S/P instrumentation and fusion surgery by Dr. Cox (11/20/2024)  - Pain management per primary team  - Comprehensive Multidisciplinary Rehab Program:    #mechanical fall  - 12/8: was being assisted to the bathroom with PCA when right leg buckled and patient fell  - No LOC, headstrike. Denies CP, SOB, palpitations, n/v/headaches. VSS.   - f/u CT Thoracic/lumbar to rule out acute changes    #CAF  #PPM  - PPM [employer], interrogated on 11/25 for MRI - shows measured data WNL, normal pacemaker function, NOT dependent.  - 11/22 TTE: LV systolic fxn normal w/ EF 64%, normal RV systolic fxn, mild to modr TR  - Metoprolol   - Xarelto     #Post op anemia (11/22)   - S/P 5U rRBC- lowest hgb 6.3 - 11/22  - S/P 3U FFP   - Monitor H/H  - Transfuse Hgb <7 PRN    #Radial artery occulusion RUE   - 11/22 LED negative  - 11/23 RUE Art Duplex: Right radial artery demonstrates diminished flow velocity at the mid forearm and is occluded at distal forearm. There is retrograde right radial artery flow at the wrist. Surrounding soft tissue edema is noted.  Antegrade flow is noted of the right ulnar artery. Patient's right arm is bruised however is warm, skin is pink, 1+pulse with good capillary refills, discussed results with patient.   - 11/23 and 11/25 RUE/LUE Dopp: negative for upper ext DVT.   - Monitor for s/s of ischemia     #HTN/HLD  - Umvqwzafklj10.5mg/[id number] daily  - monitor for OH  - Rosuvastatin    #GI ppx: Pantoprazole     #DVT ppx:  Xarelto     will follow  d/w rehab team

## 2024-12-11 PROCEDURE — 99232 SBSQ HOSP IP/OBS MODERATE 35: CPT

## 2024-12-11 RX ORDER — PREGABALIN 75 MG/1
75 CAPSULE ORAL ONCE
Refills: 0 | Status: DISCONTINUED | OUTPATIENT
Start: 2024-12-11 | End: 2024-12-11

## 2024-12-11 RX ORDER — PREGABALIN 75 MG/1
75 CAPSULE ORAL
Refills: 0 | Status: DISCONTINUED | OUTPATIENT
Start: 2024-12-11 | End: 2024-12-14

## 2024-12-11 RX ADMIN — LIDOCAINE 1 PATCH: 40 CREAM TOPICAL at 23:05

## 2024-12-11 RX ADMIN — PREGABALIN 75 MILLIGRAM(S): 75 CAPSULE ORAL at 23:03

## 2024-12-11 RX ADMIN — RIVAROXABAN 20 MILLIGRAM(S): 10 TABLET, FILM COATED ORAL at 17:16

## 2024-12-11 RX ADMIN — PREGABALIN 75 MILLIGRAM(S): 75 CAPSULE ORAL at 14:45

## 2024-12-11 RX ADMIN — METOPROLOL TARTRATE 50 MILLIGRAM(S): 100 TABLET, FILM COATED ORAL at 17:16

## 2024-12-11 RX ADMIN — TRIAMTERENE AND HYDROCHLOROTHIAZIDE 1 TABLET(S): 25; 37.5 CAPSULE ORAL at 06:36

## 2024-12-11 RX ADMIN — LIDOCAINE 1 PATCH: 40 CREAM TOPICAL at 11:46

## 2024-12-11 RX ADMIN — METOPROLOL TARTRATE 50 MILLIGRAM(S): 100 TABLET, FILM COATED ORAL at 06:36

## 2024-12-11 RX ADMIN — TRAMADOL HYDROCHLORIDE 50 MILLIGRAM(S): 300 CAPSULE ORAL at 17:16

## 2024-12-11 RX ADMIN — PANTOPRAZOLE SODIUM 40 MILLIGRAM(S): 40 TABLET, DELAYED RELEASE ORAL at 06:36

## 2024-12-11 RX ADMIN — GABAPENTIN 800 MILLIGRAM(S): 300 CAPSULE ORAL at 06:36

## 2024-12-11 RX ADMIN — TRAMADOL HYDROCHLORIDE 50 MILLIGRAM(S): 300 CAPSULE ORAL at 17:52

## 2024-12-11 RX ADMIN — ROSUVASTATIN CALCIUM 10 MILLIGRAM(S): 5 TABLET, FILM COATED ORAL at 23:06

## 2024-12-11 NOTE — PROGRESS NOTE ADULT - ASSESSMENT
Assessment/Plan:  Mrs. Ct Levine 78 y/o F w/ HTN, HLD, Afib atrial flutter, sick sinus syndrome/heart block, atrial flutter s/p cardiac ablation x1 ( 2016), PPM dependent dual chamber status (2013), breast augmentation, and chronic lower back pain who presented to [facility] on 11/20 w/c/o non radiating lower back pain, BLE weakness (R>L) and difficulty ambulating. Surgical management was recommended and she is s/p T11-pelvis instrumentation and fusion; L4-L5, L5-S1 TLIFs, T12-L1, L1-L2, L2-L3, L3-L4 posterior column osteotomies for coronal scoliosis deformity correction with Dr. Allen on 11/20. Post op course c/b post op anemia requiring multiple pRBC, airway edema s/p extubation, treated w/ steroids, dysphagia, persistent R sided weakness/BLE w/paresthesias post op (XR stable, MRI stable posterior fusion, no spinal cord compression; self limiting). Patient was evaluated by PM&R and therapy for functional deficits, gait/ADL impairments and acute rehabilitation was recommended. Patient was cleared for discharge to [facility] IRF on 12/2/24.    #Severe kyphoscoliosis of the thoracolumbar spine with trunk shift and severe stenosis at L4-5 s/p surgery  - S/P  instrumentation and fusion surgery by Dr. Cox (11/20/2024)      * T11-pelvis instrumentation and fusion      * L4-L5, L5-S1 TLIFs      * T12-L1, L1-L2, L2-L3, L3-L4 posterior column osteotomies for coronal scoliosis deformity correction      * Pain management: Tylenol PRN, Robaxin 500mg TID PRN, Tramadol 25mg q6h PRN, Gabapentin 800mg TID      * TLSO on ambulation  - Follow-up T/L spine MRI (11/25/24)      * Redemonstration of posterior fusion of T11-S1, bilateral sacroiliac fusion and multilevel facetectomies      * No gross evidence for abnormal intrinsic cord signal. No spinal cord compression, no significant spinal canal stenosis in the thoracic region.       * Expected post-op edema throughout the posterior paraspinal soft tissue at the levels of the surgery  - Deficits: Post op persistent BLE weakness/paresthesias   - Comprehensive Multidisciplinary Rehab Program:      * 3 hours a day, 5 days a week.      * PT 2hr/day: Focused on improving strength, endurance, coordination, balance, functional mobility, and transfers      * OT 1hr/day: Focused on improving strength, fine motor skills, coordination, posture and ADLs.    - Activity Limitations: Decreased social, vocational and leisure activities, decreased self care and ADLs, decreased mobility, decreased ability to manage household and finances.     -----------------------------------------------------------------------------------  Concurrent Medical Problems    #Airway edema s/p extubation  - S/P dexamethasone treatment     #Neuropathic pain  - No response to increases in gabapentin  - Started on pregabalin 75mg PO bid (12/11/24)    #PAF  #PPM  - PPM [employer], interrogated on 11/25 for MRI - shows measured data WNL, normal pacemaker function, NOT dependent.  - 11/22 TTE: LV systolic fxn normal w/ EF 64%, normal RV systolic fxn, mild to modr TR  - Metoprolol 50mg BID  - Xarelto 20mg daily to resume on 12/9 --> Last dose Lovenox on 12/8    #Post op anemia (11/22)   - S/P 5U rRBC- lowest hgb 6.3 - 11/22  - S/P 3U FFP   - Monitor H/H (11.2/34.2 - 12/2)  - Transfuse Hgb <7 PRN    #RUE ecchymosis  #Radial artery occulusion RUE   - 11/22 LED negative  - 11/23 RUE Art Duplex: Right radial artery demonstrates diminished flow velocity at the mid forearm and is occluded at distal forearm. There is retrograde right radial artery flow at the wrist. Surrounding soft tissue edema is noted.   Antegrade flow is noted of the right ulnar artery. Patient's right arm is bruised however is warm, skin is pink, 1+pulse with good capillary refills, discussed results with patient.   - 11/23 and 11/25 RUE/LUE Dopp: negative for upper ext DVT.   - Monitor for s/s of ischemia     #HTN/HLD  - Kxjcwyvacpi43.5mg/KBYC19yu daily  - Rosuvastatin 10mg HS    #Mood/Cognition:  Neuropsychology consult PRN    #Sleep:   Maintain quiet hours and low stim environment.    #GI/Bowel:  Senna QHS, Miralax 17g BID  GI ppx: Pantoprazole 40mg daily     #/Bladder:   - PVR x1 on admission (SC if > 400)    #Skin/Pressure Injury Assessment:   - Skin assessment on admission: ST L groin 4.5x1.5cm, 2.5x0.5cm ST mid upper back, 26cm mid thoracic/lumbar incision, scabbed, CDI w/ 3 drain sites, L gluteal blanchable redness with localized pustules, BLUE ecchymosis (R>L)    #Diet  Current Diet: Regular diet  Nutrition consult     #Precautions / PROPHYLAXIS:   - Falls, Spinal  - ortho: Weight bearing status: WBAT, OOB w/ TLSO brace  - Lungs: Aspiration, Incentive Spirometer   - DVT ppx: Xarelto 20mg daily starting 12/9, SCDs  - BLE Duplex US r/o DVT on admission ---> b/l calf pain  - Pressure injury/Skin: OOB to Chair, PT/OT      ---------------  Code Status: FULL  Emergency Contact:    Outpatient Follow-up (Specialty/Name of physician):    Jony Allen  Neurosurgery  [address], [address]  [place] [address]  Phone: [phone]  Fax: [phone]  Follow Up Time:     DREW AMAYA  [address], [address]  [place] [address]      --------------

## 2024-12-11 NOTE — PROGRESS NOTE ADULT - SUBJECTIVE AND OBJECTIVE BOX
HPI:  Mrs. Ct Levine 80 y/o F w/ HTN, HLD, Afib atrial flutter, sick sinus syndrome/heart block, atrial flutter s/p cardiac ablation x1 ( 2016), PPM dependent dual chamber status (2013), breast augmentation, and chronic lower back pain who presented to [facility] on 11/20 w/c/o non radiating lower back pain, BLE weakness (R>L) and difficulty ambulating. Surgical management was recommended and she is s/p T11-pelvis instrumentation and fusion; L4-L5, L5-S1 TLIFs, T12-L1, L1-L2, L2-L3, L3-L4 posterior column osteotomies for coronal scoliosis deformity correction with Dr. Allen on 11/20. Post op course c/b post op anemia requiring multiple pRBC, airway edema s/p extubation, treated w/ steroids, dysphagia, persistent R sided weakness/BLE w/paresthesias post op (XR stable, MRI stable posterior fusion, no spinal cord compression; self limiting). Patient was evaluated by PM&R and therapy for functional deficits, gait/ADL impairments and acute rehabilitation was recommended. Patient was cleared for discharge to [facility] IRF on 12/2/24. (02 Dec 2024 13:13)    TDD: 12/14/24 to Home  ___________________________________________________________________________    SUBJECTIVE/ROS  Patient was seen and evaluated at bedside today.  Reported no overnight events and is in no acute distress.  States her low back soreness is much improved but continues to note no improvement on her paresthesias.  Will discontinue gabapentin and transition to pregabalin.  Changes to the tentative discharge date as outlined above per family request.  Patient is eager to continue participation on the recommended rehabilitation program.  Denies any CP, SOB, REA, palpitations, fever, chills, body aches, cough, congestion, or any other symptoms at this time.   ___________________________________________________________________________    IMAGING (per official Radiology reports)    CT THORACIC SPINE& CT LUMBAR SPINE  PROCEDURE DATE:  12/08/2024   IMPRESSION: Status post fusion spanning T11 through the pelvis with intact hardware. New mildly displaced right L3 transverse process fracture. Otherwise no acute fracture.    ___________________________________________________________________________    LAB                        11.6   3.78  )-----------( 392      ( 09 Dec 2024 07:05 )             34.5       12-09    133[L]  |  96  |  10  ----------------------------<  109[H]  3.6   |  30  |  0.67    Ca    9.2      09 Dec 2024 07:05    TPro  7.0  /  Alb  2.7[L]  /  TBili  0.5  /  DBili  x   /  AST  28  /  ALT  30  /  AlkPhos  104  12-09    ___________________________________________________________________________    MEDICATIONS  (STANDING):  lidocaine   4% Patch 1 Patch Transdermal every 24 hours  metoprolol tartrate 50 milliGRAM(s) Oral two times a day  pantoprazole    Tablet 40 milliGRAM(s) Oral before breakfast  polyethylene glycol 3350 17 Gram(s) Oral two times a day  pregabalin 75 milliGRAM(s) Oral two times a day  rivaroxaban 20 milliGRAM(s) Oral daily  rosuvastatin 10 milliGRAM(s) Oral at bedtime  senna 2 Tablet(s) Oral at bedtime  triamterene 37.5 mG/hydrochlorothiazide 25 mG Tablet 1 Tablet(s) Oral daily    MEDICATIONS  (PRN):  acetaminophen     Tablet .. 650 milliGRAM(s) Oral every 6 hours PRN Temp greater or equal to 38C (100.4F), Mild Pain (1 - 3)  methocarbamol 500 milliGRAM(s) Oral every 8 hours PRN Muscle Spasm  traMADol 50 milliGRAM(s) Oral every 6 hours PRN Moderate Pain (4 - 6)    ___________________________________________________________________________    PHYSICAL EXAM:  Vital Signs Last 24 Hrs  T(C): 36.7 (11 Dec 2024 08:49), Max: 36.7 (11 Dec 2024 08:49)  T(F): 98.1 (11 Dec 2024 08:49), Max: 98.1 (11 Dec 2024 08:49)  HR: 65 (11 Dec 2024 08:49) (61 - 76)  BP: 129/65 (11 Dec 2024 08:49) (105/62 - 147/79)  RR: 14 (11 Dec 2024 08:49) (14 - 16)  SpO2: 94% (11 Dec 2024 08:49) (94% - 95%)    Alert   Gen - NAD, Comfortable  NEURO-     Cognitive - awake, alert, oriented to person, place, date, year, and situation. Able to follow commands     Motor -                     LEFT    LE - HF 3/5, KE 4/5, DF 5/5, PF 5/5                    RIGHT LE - HF 2/5, KE 2/5, DF 4/5, PF 5/5        Sensory - Intact  to LT      Tone - Normal  MSK - Mild tenderness noted in L lumbar paraspinal region  PSYCH - Mood stable, positive affect  SKIN - ST L groin 4.5x1.5cm, 2.5x0.5cm ST mid upper back, 26cm mid thoracic/lumbar incision, scabbed. No surrounding erythema or induration  ___________________________________________________________________________

## 2024-12-12 LAB
ALBUMIN SERPL ELPH-MCNC: 2.8 G/DL — LOW (ref 3.3–5)
ALP SERPL-CCNC: 112 U/L — SIGNIFICANT CHANGE UP (ref 40–120)
ALT FLD-CCNC: 22 U/L — SIGNIFICANT CHANGE UP (ref 10–45)
ANION GAP SERPL CALC-SCNC: 7 MMOL/L — SIGNIFICANT CHANGE UP (ref 5–17)
AST SERPL-CCNC: 18 U/L — SIGNIFICANT CHANGE UP (ref 10–40)
BASOPHILS # BLD AUTO: 0.01 K/UL — SIGNIFICANT CHANGE UP (ref 0–0.2)
BASOPHILS NFR BLD AUTO: 0.2 % — SIGNIFICANT CHANGE UP (ref 0–2)
BILIRUB SERPL-MCNC: 0.6 MG/DL — SIGNIFICANT CHANGE UP (ref 0.2–1.2)
BUN SERPL-MCNC: 10 MG/DL — SIGNIFICANT CHANGE UP (ref 7–23)
CALCIUM SERPL-MCNC: 9.6 MG/DL — SIGNIFICANT CHANGE UP (ref 8.4–10.5)
CHLORIDE SERPL-SCNC: 96 MMOL/L — SIGNIFICANT CHANGE UP (ref 96–108)
CO2 SERPL-SCNC: 30 MMOL/L — SIGNIFICANT CHANGE UP (ref 22–31)
CREAT SERPL-MCNC: 0.59 MG/DL — SIGNIFICANT CHANGE UP (ref 0.5–1.3)
EGFR: 92 ML/MIN/1.73M2 — SIGNIFICANT CHANGE UP
EOSINOPHIL # BLD AUTO: 0.19 K/UL — SIGNIFICANT CHANGE UP (ref 0–0.5)
EOSINOPHIL NFR BLD AUTO: 3.1 % — SIGNIFICANT CHANGE UP (ref 0–6)
GLUCOSE SERPL-MCNC: 110 MG/DL — HIGH (ref 70–99)
HCT VFR BLD CALC: 37 % — SIGNIFICANT CHANGE UP (ref 34.5–45)
HGB BLD-MCNC: 12.2 G/DL — SIGNIFICANT CHANGE UP (ref 11.5–15.5)
IMM GRANULOCYTES NFR BLD AUTO: 0.3 % — SIGNIFICANT CHANGE UP (ref 0–0.9)
LYMPHOCYTES # BLD AUTO: 1.01 K/UL — SIGNIFICANT CHANGE UP (ref 1–3.3)
LYMPHOCYTES # BLD AUTO: 16.4 % — SIGNIFICANT CHANGE UP (ref 13–44)
MCHC RBC-ENTMCNC: 29.6 PG — SIGNIFICANT CHANGE UP (ref 27–34)
MCHC RBC-ENTMCNC: 33 G/DL — SIGNIFICANT CHANGE UP (ref 32–36)
MCV RBC AUTO: 89.8 FL — SIGNIFICANT CHANGE UP (ref 80–100)
MONOCYTES # BLD AUTO: 0.66 K/UL — SIGNIFICANT CHANGE UP (ref 0–0.9)
MONOCYTES NFR BLD AUTO: 10.7 % — SIGNIFICANT CHANGE UP (ref 2–14)
NEUTROPHILS # BLD AUTO: 4.26 K/UL — SIGNIFICANT CHANGE UP (ref 1.8–7.4)
NEUTROPHILS NFR BLD AUTO: 69.3 % — SIGNIFICANT CHANGE UP (ref 43–77)
NRBC # BLD: 0 /100 WBCS — SIGNIFICANT CHANGE UP (ref 0–0)
PLATELET # BLD AUTO: 362 K/UL — SIGNIFICANT CHANGE UP (ref 150–400)
POTASSIUM SERPL-MCNC: 3.5 MMOL/L — SIGNIFICANT CHANGE UP (ref 3.5–5.3)
POTASSIUM SERPL-SCNC: 3.5 MMOL/L — SIGNIFICANT CHANGE UP (ref 3.5–5.3)
PROT SERPL-MCNC: 7.3 G/DL — SIGNIFICANT CHANGE UP (ref 6–8.3)
RBC # BLD: 4.12 M/UL — SIGNIFICANT CHANGE UP (ref 3.8–5.2)
RBC # FLD: 15.7 % — HIGH (ref 10.3–14.5)
SODIUM SERPL-SCNC: 133 MMOL/L — LOW (ref 135–145)
WBC # BLD: 6.15 K/UL — SIGNIFICANT CHANGE UP (ref 3.8–10.5)
WBC # FLD AUTO: 6.15 K/UL — SIGNIFICANT CHANGE UP (ref 3.8–10.5)

## 2024-12-12 PROCEDURE — 99232 SBSQ HOSP IP/OBS MODERATE 35: CPT

## 2024-12-12 RX ORDER — PREGABALIN 75 MG/1
1 CAPSULE ORAL
Qty: 60 | Refills: 0
Start: 2024-12-12 | End: 2025-01-10

## 2024-12-12 RX ORDER — LIDOCAINE 40 MG/G
1 CREAM TOPICAL
Qty: 6 | Refills: 0
Start: 2024-12-12 | End: 2025-01-10

## 2024-12-12 RX ORDER — PANTOPRAZOLE SODIUM 40 MG/1
1 TABLET, DELAYED RELEASE ORAL
Qty: 30 | Refills: 0
Start: 2024-12-12 | End: 2025-01-10

## 2024-12-12 RX ORDER — METHOCARBAMOL 500 MG/1
1 TABLET, FILM COATED ORAL
Qty: 63 | Refills: 0
Start: 2024-12-12 | End: 2025-01-01

## 2024-12-12 RX ORDER — POLYETHYLENE GLYCOL 3350 17 G/17G
17 POWDER, FOR SOLUTION ORAL
Qty: 0 | Refills: 0 | DISCHARGE
Start: 2024-12-12

## 2024-12-12 RX ORDER — TRAMADOL HYDROCHLORIDE 300 MG/1
1 CAPSULE ORAL
Qty: 28 | Refills: 0
Start: 2024-12-12 | End: 2024-12-18

## 2024-12-12 RX ORDER — ROSUVASTATIN CALCIUM 5 MG/1
1 TABLET, FILM COATED ORAL
Qty: 30 | Refills: 0
Start: 2024-12-12 | End: 2025-01-10

## 2024-12-12 RX ORDER — METOPROLOL TARTRATE 100 MG/1
1 TABLET, FILM COATED ORAL
Qty: 60 | Refills: 0
Start: 2024-12-12 | End: 2025-01-10

## 2024-12-12 RX ORDER — RIVAROXABAN 10 MG/1
1 TABLET, FILM COATED ORAL
Qty: 30 | Refills: 0
Start: 2024-12-12 | End: 2025-01-10

## 2024-12-12 RX ORDER — ACETAMINOPHEN 500MG 500 MG/1
2 TABLET, COATED ORAL
Qty: 0 | Refills: 0 | DISCHARGE
Start: 2024-12-12

## 2024-12-12 RX ORDER — PREGABALIN 75 MG/1
1 CAPSULE ORAL
Qty: 60 | Refills: 0
Start: 2024-12-12 | End: 2025-01-28

## 2024-12-12 RX ORDER — SENNOSIDES 8.6 MG
2 TABLET ORAL
Qty: 60 | Refills: 0
Start: 2024-12-12 | End: 2025-01-10

## 2024-12-12 RX ADMIN — LIDOCAINE 1 PATCH: 40 CREAM TOPICAL at 21:01

## 2024-12-12 RX ADMIN — PREGABALIN 75 MILLIGRAM(S): 75 CAPSULE ORAL at 17:44

## 2024-12-12 RX ADMIN — PREGABALIN 75 MILLIGRAM(S): 75 CAPSULE ORAL at 05:44

## 2024-12-12 RX ADMIN — PANTOPRAZOLE SODIUM 40 MILLIGRAM(S): 40 TABLET, DELAYED RELEASE ORAL at 05:45

## 2024-12-12 RX ADMIN — RIVAROXABAN 20 MILLIGRAM(S): 10 TABLET, FILM COATED ORAL at 17:43

## 2024-12-12 RX ADMIN — TRAMADOL HYDROCHLORIDE 50 MILLIGRAM(S): 300 CAPSULE ORAL at 05:47

## 2024-12-12 RX ADMIN — LIDOCAINE 1 PATCH: 40 CREAM TOPICAL at 11:36

## 2024-12-12 RX ADMIN — METOPROLOL TARTRATE 50 MILLIGRAM(S): 100 TABLET, FILM COATED ORAL at 17:44

## 2024-12-12 RX ADMIN — TRIAMTERENE AND HYDROCHLOROTHIAZIDE 1 TABLET(S): 25; 37.5 CAPSULE ORAL at 05:44

## 2024-12-12 RX ADMIN — LIDOCAINE 1 PATCH: 40 CREAM TOPICAL at 14:43

## 2024-12-12 RX ADMIN — METOPROLOL TARTRATE 50 MILLIGRAM(S): 100 TABLET, FILM COATED ORAL at 05:44

## 2024-12-12 RX ADMIN — ROSUVASTATIN CALCIUM 10 MILLIGRAM(S): 5 TABLET, FILM COATED ORAL at 21:01

## 2024-12-12 NOTE — PROGRESS NOTE ADULT - TIME BILLING
Time spent includes direct patient care  (interview and examination of patient), discussion with other providers, support staff and/or patient's family members, review of medical records, ordering diagnostic tests and analyzing results, and documentation.
Time spent includes direct patient care  (interview and examination of patient), discussion with other providers, support staff and/or patient's family members, review of medical records, imaging and lab results.

## 2024-12-12 NOTE — PROGRESS NOTE ADULT - SUBJECTIVE AND OBJECTIVE BOX
HPI:  Mrs. Ct Levine 78 y/o F w/ HTN, HLD, Afib atrial flutter, sick sinus syndrome/heart block, atrial flutter s/p cardiac ablation x1 ( 2016), PPM dependent dual chamber status (2013), breast augmentation, and chronic lower back pain who presented to [facility] on 11/20 w/c/o non radiating lower back pain, BLE weakness (R>L) and difficulty ambulating. Surgical management was recommended and she is s/p T11-pelvis instrumentation and fusion; L4-L5, L5-S1 TLIFs, T12-L1, L1-L2, L2-L3, L3-L4 posterior column osteotomies for coronal scoliosis deformity correction with Dr. Allen on 11/20. Post op course c/b post op anemia requiring multiple pRBC, airway edema s/p extubation, treated w/ steroids, dysphagia, persistent R sided weakness/BLE w/paresthesias post op (XR stable, MRI stable posterior fusion, no spinal cord compression; self limiting). Patient was evaluated by PM&R and therapy for functional deficits, gait/ADL impairments and acute rehabilitation was recommended. Patient was cleared for discharge to [facility] IRF on 12/2/24. (02 Dec 2024 13:13)    TDD: 12/14/24 to Home  ___________________________________________________________________________    SUBJECTIVE/ROS  Patient was seen and evaluated at bedside today.  Reported no overnight events and is in no acute distress.  States her low back soreness is better and note some improvement on her paresthesias with pregabalin.  Case was discussed at Interdisciplinary Team meeting today.  No changes to the tentative discharge date outlined above.  Patient is eager to continue participation on the recommended rehabilitation program.  Denies any CP, SOB, REA, palpitations, fever, chills, body aches, cough, congestion, or any other symptoms at this time.   ___________________________________________________________________________    IMAGING (per official Radiology reports)    CT THORACIC SPINE& CT LUMBAR SPINE  PROCEDURE DATE:  12/08/2024   IMPRESSION: Status post fusion spanning T11 through the pelvis with intact hardware. New mildly displaced right L3 transverse process fracture. Otherwise no acute fracture.    ___________________________________________________________________________    LAB                        12.2   6.15  )-----------( 362      ( 12 Dec 2024 07:39 )             37.0     12-12    133[L]  |  96  |  10  ----------------------------<  110[H]  3.5   |  30  |  0.59    Ca    9.6      12 Dec 2024 07:39    TPro  7.3  /  Alb  2.8[L]  /  TBili  0.6  /  DBili  x   /  AST  18  /  ALT  22  /  AlkPhos  112  12-12    LIVER FUNCTIONS - ( 12 Dec 2024 07:39 )  Alb: 2.8 g/dL / Pro: 7.3 g/dL / ALK PHOS: 112 U/L / ALT: 22 U/L / AST: 18 U/L / GGT: x           ___________________________________________________________________________    MEDICATIONS  (STANDING):  lidocaine   4% Patch 1 Patch Transdermal every 24 hours  metoprolol tartrate 50 milliGRAM(s) Oral two times a day  pantoprazole    Tablet 40 milliGRAM(s) Oral before breakfast  polyethylene glycol 3350 17 Gram(s) Oral two times a day  pregabalin 75 milliGRAM(s) Oral two times a day  rivaroxaban 20 milliGRAM(s) Oral daily  rosuvastatin 10 milliGRAM(s) Oral at bedtime  senna 2 Tablet(s) Oral at bedtime  triamterene 37.5 mG/hydrochlorothiazide 25 mG Tablet 1 Tablet(s) Oral daily    MEDICATIONS  (PRN):  acetaminophen     Tablet .. 650 milliGRAM(s) Oral every 6 hours PRN Temp greater or equal to 38C (100.4F), Mild Pain (1 - 3)  methocarbamol 500 milliGRAM(s) Oral every 8 hours PRN Muscle Spasm  traMADol 50 milliGRAM(s) Oral every 6 hours PRN Moderate Pain (4 - 6)    ___________________________________________________________________________    PHYSICAL EXAM:  Vital Signs Last 24 Hrs  T(C): 36.3 (12 Dec 2024 07:09), Max: 36.9 (11 Dec 2024 19:57)  T(F): 97.3 (12 Dec 2024 07:09), Max: 98.5 (11 Dec 2024 19:57)  HR: 58 (12 Dec 2024 07:09) (58 - 75)  BP: 117/71 (12 Dec 2024 07:09) (117/71 - 130/65)  RR: 15 (12 Dec 2024 07:09) (15 - 16)  SpO2: 93% (12 Dec 2024 07:09) (93% - 96%)    Alert   Gen - NAD, Comfortable  NEURO-     Cognitive - awake, alert, oriented to person, place, date, year, and situation. Able to follow commands     Motor -                     LEFT    LE - HF 3/5, KE 4/5, DF 5/5, PF 5/5                    RIGHT LE - HF 2/5, KE 2/5, DF 4/5, PF 5/5        Sensory - Intact  to LT      Tone - Normal  MSK - Mild tenderness noted in L lumbar paraspinal region  PSYCH - Mood stable, positive affect  SKIN - ST L groin 4.5x1.5cm, 2.5x0.5cm ST mid upper back, 26cm mid thoracic/lumbar incision, scabbed. No surrounding erythema or induration  ___________________________________________________________________________

## 2024-12-12 NOTE — PROGRESS NOTE ADULT - ASSESSMENT
Assessment/Plan:  Mrs. Ct Levine 78 y/o F w/ HTN, HLD, Afib atrial flutter, sick sinus syndrome/heart block, atrial flutter s/p cardiac ablation x1 ( 2016), PPM dependent dual chamber status (2013), breast augmentation, and chronic lower back pain who presented to [facility] on 11/20 w/c/o non radiating lower back pain, BLE weakness (R>L) and difficulty ambulating. Surgical management was recommended and she is s/p T11-pelvis instrumentation and fusion; L4-L5, L5-S1 TLIFs, T12-L1, L1-L2, L2-L3, L3-L4 posterior column osteotomies for coronal scoliosis deformity correction with Dr. Allen on 11/20. Post op course c/b post op anemia requiring multiple pRBC, airway edema s/p extubation, treated w/ steroids, dysphagia, persistent R sided weakness/BLE w/paresthesias post op (XR stable, MRI stable posterior fusion, no spinal cord compression; self limiting). Patient was evaluated by PM&R and therapy for functional deficits, gait/ADL impairments and acute rehabilitation was recommended. Patient was cleared for discharge to [facility] IRF on 12/2/24.    #Severe kyphoscoliosis of the thoracolumbar spine with trunk shift and severe stenosis at L4-5 s/p surgery  - S/P  instrumentation and fusion surgery by Dr. Cox (11/20/2024)      * T11-pelvis instrumentation and fusion      * L4-L5, L5-S1 TLIFs      * T12-L1, L1-L2, L2-L3, L3-L4 posterior column osteotomies for coronal scoliosis deformity correction      * Pain management: Tylenol PRN, Robaxin 500mg TID PRN, Tramadol 25mg q6h PRN, Gabapentin 800mg TID      * TLSO on ambulation  - Follow-up T/L spine MRI (11/25/24)      * Redemonstration of posterior fusion of T11-S1, bilateral sacroiliac fusion and multilevel facetectomies      * No gross evidence for abnormal intrinsic cord signal. No spinal cord compression, no significant spinal canal stenosis in the thoracic region.       * Expected post-op edema throughout the posterior paraspinal soft tissue at the levels of the surgery  - Deficits: Post op persistent BLE weakness/paresthesias   - Comprehensive Multidisciplinary Rehab Program:      * 3 hours a day, 5 days a week.      * PT 2hr/day: Focused on improving strength, endurance, coordination, balance, functional mobility, and transfers      * OT 1hr/day: Focused on improving strength, fine motor skills, coordination, posture and ADLs.    - Activity Limitations: Decreased social, vocational and leisure activities, decreased self care and ADLs, decreased mobility, decreased ability to manage household and finances.     -----------------------------------------------------------------------------------  Concurrent Medical Problems    #Airway edema s/p extubation  - S/P dexamethasone treatment     #Neuropathic pain  - No response to increases in gabapentin  - Started on pregabalin 75mg PO bid (12/11/24)    #PAF  #PPM  - PPM [facility], interrogated on 11/25 for MRI - shows measured data WNL, normal pacemaker function, NOT dependent.  - 11/22 TTE: LV systolic fxn normal w/ EF 64%, normal RV systolic fxn, mild to modr TR  - Metoprolol 50mg BID  - Xarelto 20mg daily to resume on 12/9 --> Last dose Lovenox on 12/8    #Post op anemia (11/22)   - S/P 5U rRBC- lowest hgb 6.3 - 11/22  - S/P 3U FFP   - Monitor H/H (11.2/34.2 - 12/2)  - Transfuse Hgb <7 PRN    #RUE ecchymosis  #Radial artery occulusion RUE   - 11/22 LED negative  - 11/23 RUE Art Duplex: Right radial artery demonstrates diminished flow velocity at the mid forearm and is occluded at distal forearm. There is retrograde right radial artery flow at the wrist. Surrounding soft tissue edema is noted.   Antegrade flow is noted of the right ulnar artery. Patient's right arm is bruised however is warm, skin is pink, 1+pulse with good capillary refills, discussed results with patient.   - 11/23 and 11/25 RUE/LUE Dopp: negative for upper ext DVT.   - Monitor for s/s of ischemia     #HTN/HLD  - Enshvuorzgn45.5mg/HNUL86dq daily  - Rosuvastatin 10mg HS    #Mood/Cognition:  Neuropsychology consult PRN    #Sleep:   Maintain quiet hours and low stim environment.    #GI/Bowel:  Senna QHS, Miralax 17g BID  GI ppx: Pantoprazole 40mg daily     #/Bladder:   - PVR x1 on admission (SC if > 400)    #Skin/Pressure Injury Assessment:   - Skin assessment on admission: ST L groin 4.5x1.5cm, 2.5x0.5cm ST mid upper back, 26cm mid thoracic/lumbar incision, scabbed, CDI w/ 3 drain sites, L gluteal blanchable redness with localized pustules, BLUE ecchymosis (R>L)    #Diet  Current Diet: Regular diet  Nutrition consult     #Precautions / PROPHYLAXIS:   - Falls, Spinal  - ortho: Weight bearing status: WBAT, OOB w/ TLSO brace  - Lungs: Aspiration, Incentive Spirometer   - DVT ppx: Xarelto 20mg daily starting 12/9, SCDs  - BLE Duplex US r/o DVT on admission ---> b/l calf pain  - Pressure injury/Skin: OOB to Chair, PT/OT      ---------------  Code Status: FULL  Emergency Contact:    Outpatient Follow-up (Specialty/Name of physician):    Jony Allen  Neurosurgery  [address], [address]  [place] [address]  Phone: [phone]  Fax: [phone]  Follow Up Time:     DREW AMAYA  [address], [address]  [place] [address]      --------------

## 2024-12-12 NOTE — PROGRESS NOTE ADULT - ASSESSMENT
78 y/o F w/ HTN, HLD, Afib atrial flutter, sick sinus syndrome/heart block, atrial flutter s/p cardiac ablation x1 ( 2016), PPM dependent dual chamber status (2013), breast augmentation, and chronic lower back pain who presented to [facility] on 11/20 w/c/o non radiating lower back pain, BLE weakness (R>L) and difficulty ambulating. Surgical management was recommended and she is s/p T11-pelvis instrumentation and fusion; L4-L5, L5-S1 TLIFs, T12-L1, L1-L2, L2-L3, L3-L4 posterior column osteotomies for coronal scoliosis deformity correction with Dr. Allen on 11/20. Post op course c/b post op anemia requiring multiple pRBC, airway edema s/p extubation, treated w/ steroids, dysphagia, persistent R sided weakness/BLE w/paresthesias post op (XR stable, MRI stable posterior fusion, no spinal cord compression; self limiting). Patient was evaluated by PM&R and therapy for functional deficits, gait/ADL impairments and acute rehabilitation was recommended. Patient was cleared for discharge to [facility] IRF on 12/2/24.      #Severe kyphoscoliosis of the thoracolumbar spine with trunk shift and severe stenosis at L4-5 s/p surgery  - S/P instrumentation and fusion surgery by Dr. Cox (11/20/2024)  - Pain management per primary team  - Comprehensive Multidisciplinary Rehab Program:    #hyponatremia  - sodium 133 today, continue to monitor and send off hyponatremia workup with serum osm, urine sodium (random) if sodium <130    #CAF  #PPM  - PPM [employer], interrogated on 11/25 for MRI - shows measured data WNL, normal pacemaker function, NOT dependent.  - 11/22 TTE: LV systolic fxn normal w/ EF 64%, normal RV systolic fxn, mild to modr TR  - Metoprolol   - Xarelto     #Post op anemia (11/22), improved.  - S/P 5U rRBC- lowest hgb 6.3 - 11/22  - S/P 3U FFP   - Monitor H/H  - Transfuse Hgb <7 PRN    #Radial artery occulusion RUE   - 11/23 RUE Art Duplex: Right radial artery demonstrates diminished flow velocity at the mid forearm and is occluded at distal forearm. continue monitoring circulation and pulse  - 11/23 and 11/25 RUE/LUE Dopp: negative for upper ext DVT.   - Monitor for s/s of ischemia     #HTN/HLD  - Xuaqgwlcndx86.5mg/LJOQ27wj daily  - monitor for OH  - Rosuvastatin    #GI ppx: Pantoprazole     #DVT ppx:  Xarelto     12/12 labs reviewed  d/w rehab team

## 2024-12-12 NOTE — CHART NOTE - NSCHARTNOTEFT_GEN_A_CORE
Nutrition Follow Up Note  Hospital Course   (Per Electronic Medical Record)    Source:  Patient [X]  Medical Record [X]      Diet:   Regular Diet (IDDSI Level 7) w/ Thin Liquids (IDDSI Level 0)  Tolerates Diet Consistency Well  No Chewing/Swallowing Difficulties  No Recent Nausea, Vomiting, Diarrhea or Constipation (as Per Patient)  Consumes % of Meals (as Per Documentation) - States Good PO Intake/Appetite (Per Patient)    Declines Nutrition Supplementation   Obtained Food Preferences from Patient     Enteral/Parenteral Nutrition: Not Applicable    Current Weight: 160lb on 12/11  Obtain New Weight  Obtain Weights Weekly     Pertinent Medications: MEDICATIONS  (STANDING):  lidocaine   4% Patch 1 Patch Transdermal every 24 hours  metoprolol tartrate 50 milliGRAM(s) Oral two times a day  pantoprazole    Tablet 40 milliGRAM(s) Oral before breakfast  polyethylene glycol 3350 17 Gram(s) Oral two times a day  pregabalin 75 milliGRAM(s) Oral two times a day  rivaroxaban 20 milliGRAM(s) Oral daily  rosuvastatin 10 milliGRAM(s) Oral at bedtime  senna 2 Tablet(s) Oral at bedtime  triamterene 37.5 mG/hydrochlorothiazide 25 mG Tablet 1 Tablet(s) Oral daily    MEDICATIONS  (PRN):  acetaminophen     Tablet .. 650 milliGRAM(s) Oral every 6 hours PRN Temp greater or equal to 38C (100.4F), Mild Pain (1 - 3)  methocarbamol 500 milliGRAM(s) Oral every 8 hours PRN Muscle Spasm  traMADol 50 milliGRAM(s) Oral every 6 hours PRN Moderate Pain (4 - 6)    Pertinent Labs:  12-12 Na133 mmol/L[L] Glu 110 mg/dL[H] K+ 3.5 mmol/L Cr  0.59 mg/dL BUN 10 mg/dL 12-12 Alb 2.8 g/dL[L]    Skin: No Pressure Ulcers  Surgical Incision on Back  (as Per Nursing Flow Sheet)     Edema: None Noted (as Per Documentation)     Last Bowel Movement: on 12/10    Estimated Needs:   [X] No Change Since Previous Assessment    Previous Nutrition Diagnosis:   Moderate Malnutrition     Nutrition Diagnosis is [X] Ongoing - Declines Nutrition Supplementation     New Nutrition Diagnosis: [X] Not Applicable    Interventions:   1. Recommend Continue Nutrition Plan of Care     Monitoring & Evaluation:   [X] Weights   [X] PO Intake   [X] Skin Integrity   [X] Follow Up (Per Protocol)  [X] Tolerance to Diet Prescription   [X] Other: Labs     Registered Dietitian/Nutritionist Remains Available.  Arcadio Coley, TAYLORN, CDN    Phone# [phone]

## 2024-12-12 NOTE — PROGRESS NOTE ADULT - SUBJECTIVE AND OBJECTIVE BOX
back pain improved    PHYSICAL EXAM:    Vital Signs Last 24 Hrs  T(F): 98.1 (12 Dec 2024 20:03), Max: 98.1 (12 Dec 2024 20:03)  HR: 63 (12 Dec 2024 20:03) (58 - 75)  BP: 116/56 (12 Dec 2024 20:03) (116/56 - 130/65)  RR: 16 (12 Dec 2024 20:03) (15 - 16)  SpO2: 95% (12 Dec 2024 20:03) (93% - 95%)  I&O's Summary    12 Dec 2024 07:01  -  12 Dec 2024 23:49  --------------------------------------------------------  IN: 0 mL / OUT: 2 mL / NET: -2 mL        GENERAL: NAD  HEENT: NCAT  CHEST/LUNG: No increased WOB, Clear to percussion bilaterally; No rales, rhonchi, wheezing  HEART: Regular rate and rhythm; No murmurs  ABDOMEN: Soft, Nontender, Nondistended; Bowel sounds present  MUSCULOSKELETAL/EXTREMITIES: 1+ bilateral upper ext pulses No LE edema  PSYCH: Appropriate affect, Alert & Oriented    LABS:                        12.2   6.15  )-----------( 362      ( 12 Dec 2024 07:39 )             37.0       12-12    133  |  96  |  10  ----------------------------<  110  3.5   |  30  |  0.59    Ca    9.6      12 Dec 2024 07:39    TPro  7.3  /  Alb  2.8  /  TBili  0.6  /  DBili  x   /  AST  18  /  ALT  22  /  AlkPhos  112  12-12                                  Urinalysis Basic - ( 12 Dec 2024 07:39 )    Color: x / Appearance: x / SG: x / pH: x  Gluc: 110 mg/dL / Ketone: x  / Bili: x / Urobili: x   Blood: x / Protein: x / Nitrite: x   Leuk Esterase: x / RBC: x / WBC x   Sq Epi: x / Non Sq Epi: x / Bacteria: x        COVID-19 PCR: NotDetec (12-02-24 @ 19:45)      MEDICATIONS  (STANDING):  lidocaine   4% Patch 1 Patch Transdermal every 24 hours  metoprolol tartrate 50 milliGRAM(s) Oral two times a day  pantoprazole    Tablet 40 milliGRAM(s) Oral before breakfast  polyethylene glycol 3350 17 Gram(s) Oral two times a day  pregabalin 75 milliGRAM(s) Oral two times a day  rivaroxaban 20 milliGRAM(s) Oral daily  rosuvastatin 10 milliGRAM(s) Oral at bedtime  senna 2 Tablet(s) Oral at bedtime  triamterene 37.5 mG/hydrochlorothiazide 25 mG Tablet 1 Tablet(s) Oral daily    MEDICATIONS  (PRN):  acetaminophen     Tablet .. 650 milliGRAM(s) Oral every 6 hours PRN Temp greater or equal to 38C (100.4F), Mild Pain (1 - 3)  methocarbamol 500 milliGRAM(s) Oral every 8 hours PRN Muscle Spasm  traMADol 50 milliGRAM(s) Oral every 6 hours PRN Moderate Pain (4 - 6)      Care Discussed with Consultants/Other Providers: Yes

## 2024-12-13 ENCOUNTER — TRANSCRIPTION ENCOUNTER (OUTPATIENT)
Age: 79
End: 2024-12-13

## 2024-12-13 PROCEDURE — 72128 CT CHEST SPINE W/O DYE: CPT | Mod: 26

## 2024-12-13 PROCEDURE — 72131 CT LUMBAR SPINE W/O DYE: CPT | Mod: 26

## 2024-12-13 PROCEDURE — 99232 SBSQ HOSP IP/OBS MODERATE 35: CPT

## 2024-12-13 RX ADMIN — METOPROLOL TARTRATE 50 MILLIGRAM(S): 100 TABLET, FILM COATED ORAL at 18:04

## 2024-12-13 RX ADMIN — TRAMADOL HYDROCHLORIDE 50 MILLIGRAM(S): 300 CAPSULE ORAL at 06:47

## 2024-12-13 RX ADMIN — ROSUVASTATIN CALCIUM 10 MILLIGRAM(S): 5 TABLET, FILM COATED ORAL at 21:04

## 2024-12-13 RX ADMIN — PREGABALIN 75 MILLIGRAM(S): 75 CAPSULE ORAL at 06:41

## 2024-12-13 RX ADMIN — PANTOPRAZOLE SODIUM 40 MILLIGRAM(S): 40 TABLET, DELAYED RELEASE ORAL at 06:41

## 2024-12-13 RX ADMIN — RIVAROXABAN 20 MILLIGRAM(S): 10 TABLET, FILM COATED ORAL at 18:05

## 2024-12-13 RX ADMIN — METOPROLOL TARTRATE 50 MILLIGRAM(S): 100 TABLET, FILM COATED ORAL at 06:41

## 2024-12-13 RX ADMIN — PREGABALIN 75 MILLIGRAM(S): 75 CAPSULE ORAL at 18:05

## 2024-12-13 RX ADMIN — LIDOCAINE 1 PATCH: 40 CREAM TOPICAL at 21:05

## 2024-12-13 RX ADMIN — TRIAMTERENE AND HYDROCHLOROTHIAZIDE 1 TABLET(S): 25; 37.5 CAPSULE ORAL at 06:42

## 2024-12-13 NOTE — PROGRESS NOTE ADULT - ATTENDING COMMENTS
I independently performed the documented the history, exam, and medical decision making. I have made amendments to the documentation where necessary. I have personally seen and examined the patient. Medical records were reviewed and I have made amendments to the documentation where necessary and adjusted the history, physical examination, and plan as documented by the Medical Student.
I independently performed the documented the history, exam, and medical decision making. I have made amendments to the documentation where necessary. I have personally seen and examined the patient. Medical records were reviewed and I have made amendments to the documentation where necessary and adjusted the history, physical examination, and plan as documented by the Resident Physician.
Seen and examined, note revised, findings as stated by rehab resident    Patient reports good night rest   Improvement of LE motor strength with therapy   Low Na but asymptomatic     Continue therapy   Continue lidocaine patch to right thigh  Monitor Na level, asymptomatic   Spine brace when OOB

## 2024-12-13 NOTE — PROGRESS NOTE ADULT - SUBJECTIVE AND OBJECTIVE BOX
HPI:  Mrs. Ct Levine 80 y/o F w/ HTN, HLD, Afib atrial flutter, sick sinus syndrome/heart block, atrial flutter s/p cardiac ablation x1 ( 2016), PPM dependent dual chamber status (2013), breast augmentation, and chronic lower back pain who presented to [facility] on 11/20 w/c/o non radiating lower back pain, BLE weakness (R>L) and difficulty ambulating. Surgical management was recommended and she is s/p T11-pelvis instrumentation and fusion; L4-L5, L5-S1 TLIFs, T12-L1, L1-L2, L2-L3, L3-L4 posterior column osteotomies for coronal scoliosis deformity correction with Dr. Allen on 11/20. Post op course c/b post op anemia requiring multiple pRBC, airway edema s/p extubation, treated w/ steroids, dysphagia, persistent R sided weakness/BLE w/paresthesias post op (XR stable, MRI stable posterior fusion, no spinal cord compression; self limiting). Patient was evaluated by PM&R and therapy for functional deficits, gait/ADL impairments and acute rehabilitation was recommended. Patient was cleared for discharge to [facility] IRF on 12/2/24. (02 Dec 2024 13:13)    TDD 12/14/24 to home  ___________________________________________________________________________    SUBJECTIVE/ROS  Patient was seen and evaluated in PT today.  Reported no overnight events and is in no acute distress. She does continue to have band-like pain in the T6-T10 area R>L.  Denies any CP, SOB, palpitations, cough, or any other symptoms at this time.   ___________________________________________________________________________    VITALS  T(C): 37.1 (12-13-24 @ 09:45), Max: 37.1 (12-13-24 @ 09:45)  HR: 58 (12-13-24 @ 09:45) (58 - 63)  BP: 126/62 (12-13-24 @ 09:45) (116/56 - 126/62)  RR: 16 (12-13-24 @ 09:45) (16 - 16)  SpO2: 97% (12-13-24 @ 09:45) (95% - 97%)  ___________________________________________________________________________    LABS             12.2   6.15  )-----------( 362      ( 12 Dec 2024 07:39 )             37.0     133[L]  |  96  |  10  ----------------------------<  110[H]  3.5   |  30  |  0.59    Ca    9.6      12 Dec 2024 07:39  TPro  7.3  /  Alb  2.8[L]  /  TBili  0.6  /  DBili  x   /  AST  18  /  ALT  22  /  AlkPhos  112  12-12    ___________________________________________________________________________    MEDICATIONS  (STANDING):  lidocaine   4% Patch 1 Patch Transdermal every 24 hours  metoprolol tartrate 50 milliGRAM(s) Oral two times a day  pantoprazole    Tablet 40 milliGRAM(s) Oral before breakfast  polyethylene glycol 3350 17 Gram(s) Oral two times a day  pregabalin 75 milliGRAM(s) Oral two times a day  rivaroxaban 20 milliGRAM(s) Oral daily  rosuvastatin 10 milliGRAM(s) Oral at bedtime  senna 2 Tablet(s) Oral at bedtime  triamterene 37.5 mG/hydrochlorothiazide 25 mG Tablet 1 Tablet(s) Oral daily    MEDICATIONS  (PRN):  acetaminophen     Tablet .. 650 milliGRAM(s) Oral every 6 hours PRN Temp greater or equal to 38C (100.4F), Mild Pain (1 - 3)  methocarbamol 500 milliGRAM(s) Oral every 8 hours PRN Muscle Spasm  traMADol 50 milliGRAM(s) Oral every 6 hours PRN Moderate Pain (4 - 6)    ___________________________________________________________________________    PHYSICAL EXAM:    Constitutional - NAD, Comfortable  Chest - good chest expansion, good respiratory effort  Cardio - warm and well perfused  Neurologic Exam:                           Orientation: Awake, A&O to self, place, date, year, situation     Speech - Fluent, Comprehensible, No dysarthria, No aphasia      Motor - Seen in PT doing multiple R-sided gluteus medius exercises; able to do clam shells but not leg raises (with straight leg); L glut med stronger than right. Able to transfer from supine to sitting at edge of bed and from edge of bed to WC with CG only   Psychiatric - Mood stable, Affect WNL    ___________________________________________________________________________

## 2024-12-13 NOTE — DISCHARGE NOTE NURSING/CASE MANAGEMENT/SOCIAL WORK - FINANCIAL ASSISTANCE
[facility] provides services at a reduced cost to those who are determined to be eligible through [facility]’s financial assistance program. Information regarding [facility]’s financial assistance program can be found by going to [email or web address] or by calling [phone].

## 2024-12-13 NOTE — DISCHARGE NOTE NURSING/CASE MANAGEMENT/SOCIAL WORK - NSDCPEFALRISK_GEN_ALL_CORE
For information on Fall & Injury Prevention, visit: [email or web address] OR  [email or web address] OR  https://www.cdc.gov/steadi/patient.html

## 2024-12-13 NOTE — DISCHARGE NOTE NURSING/CASE MANAGEMENT/SOCIAL WORK - NSDCVIVACCINE_GEN_ALL_CORE_FT
Td (adult) preservative free; 26-Dec-2020 10:09; Anum Muñoz (ERNESTINA); [employer]; a118a (Exp. Date: 11-Apr-2021); IntraMuscular; Deltoid Left.; 0.5 milliLiter(s); VIS (VIS Published: 26-Dec-2020, VIS Presented: 26-Dec-2020);

## 2024-12-13 NOTE — PROGRESS NOTE ADULT - ASSESSMENT
Assessment/Plan:  Mrs. Ct Levine 80 y/o F w/ HTN, HLD, Afib atrial flutter, sick sinus syndrome/heart block, atrial flutter s/p cardiac ablation x1 ( 2016), PPM dependent dual chamber status (2013), breast augmentation, and chronic lower back pain who presented to [facility] on 11/20 w/c/o non radiating lower back pain, BLE weakness (R>L) and difficulty ambulating. Surgical management was recommended and she is s/p T11-pelvis instrumentation and fusion; L4-L5, L5-S1 TLIFs, T12-L1, L1-L2, L2-L3, L3-L4 posterior column osteotomies for coronal scoliosis deformity correction with Dr. Allen on 11/20. Post op course c/b post op anemia requiring multiple pRBC, airway edema s/p extubation, treated w/ steroids, dysphagia, persistent R sided weakness/BLE w/paresthesias post op (XR stable, MRI stable posterior fusion, no spinal cord compression; self limiting). Patient was evaluated by PM&R and therapy for functional deficits, gait/ADL impairments and acute rehabilitation was recommended. Patient was cleared for discharge to [facility] IRF on 12/2/24.    #Severe kyphoscoliosis of the thoracolumbar spine with trunk shift and severe stenosis at L4-5 s/p surgery  - S/P  instrumentation and fusion surgery by Dr. Cox (11/20/2024)      * T11-pelvis instrumentation and fusion      * L4-L5, L5-S1 TLIFs      * T12-L1, L1-L2, L2-L3, L3-L4 posterior column osteotomies for coronal scoliosis deformity correction      * Pain management: Tylenol 650mg PO q6h PRN for mild pain, Robaxin 500mg PO TID PRN for muscle spasm, Tramadol 50mg PO q6h PRN, Lyrica 75mg PO BID, lidocaine patches      * TLSO on ambulation  - Follow-up T/L spine MRI (11/25/24)      * Redemonstration of posterior fusion of T11-S1, bilateral sacroiliac fusion and multilevel facetectomies      * No gross evidence for abnormal intrinsic cord signal. No spinal cord compression, no significant spinal canal stenosis in the thoracic region.       * Expected post-op edema throughout the posterior paraspinal soft tissue at the levels of the surgery  - Deficits: Post op persistent BLE weakness/paresthesias   - Comprehensive Multidisciplinary Rehab Program:      * 3 hours a day, 5 days a week      * PT 2hr/day: Focused on improving strength, endurance, coordination, balance, functional mobility, and transfers      * OT 1hr/day: Focused on improving strength, fine motor skills, coordination, posture and ADLs    -----------------------------------------------------------------------------------  Concurrent Medical Problems    #Airway edema s/p extubation  - S/P dexamethasone treatment     #Neuropathic pain  - No response to increases in gabapentin  - Started on pregabalin 75mg PO BID (12/11/24)    #PAF  #PPM  - PPM [employer], interrogated on 11/25 for MRI - shows measured data WNL, normal pacemaker function, NOT dependent.  - 11/22 TTE: LV systolic fxn normal w/ EF 64%, normal RV systolic fxn, mild to modr TR  - Metoprolol 50mg BID  - Xarelto 20mg PO daily resumed on 12/9 --> Last dose Lovenox on 12/8    #Post op anemia (11/22)   - S/P 5U rRBC- lowest hgb 6.3 - 11/22  - S/P 3U FFP   - Monitor H/H (12.2/37.0 - 12/12)  - Transfuse Hgb <7 PRN    #RUE ecchymosis  #Radial artery occulusion RUE   - 11/22 LED negative  - 11/23 RUE Art Duplex: Right radial artery demonstrates diminished flow velocity at the mid forearm and is occluded at distal forearm. There is retrograde right radial artery flow at the wrist. Surrounding soft tissue edema is noted.   Antegrade flow is noted of the right ulnar artery. Patient's right arm is bruised however is warm, skin is pink, 1+pulse with good capillary refills, discussed results with patient.   - 11/23 and 11/25 RUE/LUE Dopp: negative for upper ext DVT.   - monitor for s/s of ischemia     #HTN/HLD  - triamterene 37.5mg/HCTZ 25mg PO daily  - metoprolol tartrate 50mg PO BID  - rosuvastatin 10mg PO qhs    #Sleep:   - maintain quiet hours and low stim environment    #GI/Bowel:  - senna 2 tablets PO qhs  - Miralax 17g PO BID  GI ppx: pantoprazole PO 40mg daily     #Skin/Pressure Injury Assessment:   - Skin assessment on admission: ST L groin 4.5x1.5cm, 2.5x0.5cm ST mid upper back, 26cm mid thoracic/lumbar incision, scabbed, CDI w/ 3 drain sites, L gluteal blanchable redness with localized pustules, BLUE ecchymosis (R>L)    #Diet  Current Diet: Regular diet  - nutrition consult     #Precautions / PROPHYLAXIS:   - Falls, Spinal  - ortho: Weight bearing status: WBAT, OOB w/ TLSO brace  - Lungs: Aspiration, Incentive Spirometer   - DVT ppx: Xarelto 20mg PO daily started 12/9, SCDs  - BLE Duplex US r/o DVT on admission ---> b/l calf pain  - Pressure injury/Skin: OOB to Chair, PT/OT      ---------------  Outpatient Follow-up:    Jony Allen  Neurosurgery  [address]  [place] [address]  Phone: [phone]  Fax: [phone]  Follow Up Time:     DREW AMAYA  [address], [address]  [place] [address]    -------------- Assessment/Plan:  Mrs. Ct Levine 78 y/o F w/ HTN, HLD, Afib atrial flutter, sick sinus syndrome/heart block, atrial flutter s/p cardiac ablation x1 ( 2016), PPM dependent dual chamber status (2013), breast augmentation, and chronic lower back pain who presented to [facility] on 11/20 w/c/o non radiating lower back pain, BLE weakness (R>L) and difficulty ambulating. Surgical management was recommended and she is s/p T11-pelvis instrumentation and fusion; L4-L5, L5-S1 TLIFs, T12-L1, L1-L2, L2-L3, L3-L4 posterior column osteotomies for coronal scoliosis deformity correction with Dr. Allen on 11/20. Post op course c/b post op anemia requiring multiple pRBC, airway edema s/p extubation, treated w/ steroids, dysphagia, persistent R sided weakness/BLE w/paresthesias post op (XR stable, MRI stable posterior fusion, no spinal cord compression; self limiting). Patient was evaluated by PM&R and therapy for functional deficits, gait/ADL impairments and acute rehabilitation was recommended. Patient was cleared for discharge to [facility] IRF on 12/2/24.    #Severe kyphoscoliosis of the thoracolumbar spine with trunk shift and severe stenosis at L4-5 s/p surgery  - S/P  instrumentation and fusion surgery by Dr. Cox (11/20/2024)      * T11-pelvis instrumentation and fusion      * L4-L5, L5-S1 TLIFs      * T12-L1, L1-L2, L2-L3, L3-L4 posterior column osteotomies for coronal scoliosis deformity correction      * Pain management: Tylenol 650mg PO q6h PRN for mild pain, Robaxin 500mg PO TID PRN for muscle spasm, Tramadol 50mg PO q6h PRN, Lyrica 75mg PO BID, lidocaine patches      * TLSO on ambulation  - Follow-up T/L spine MRI (11/25/24)      * Redemonstration of posterior fusion of T11-S1, bilateral sacroiliac fusion and multilevel facetectomies      * No gross evidence for abnormal intrinsic cord signal. No spinal cord compression, no significant spinal canal stenosis in the thoracic region.       * Expected post-op edema throughout the posterior paraspinal soft tissue at the levels of the surgery  - Deficits: Post op persistent BLE weakness/paresthesias   - Comprehensive Multidisciplinary Rehab Program:      * 3 hours a day, 5 days a week      * PT 2hr/day: Focused on improving strength, endurance, coordination, balance, functional mobility, and transfers      * OT 1hr/day: Focused on improving strength, fine motor skills, coordination, posture and ADLs  - check CT T- and L-spine 12/13/24 due to persistent band-like pain in T6-T10 area    -----------------------------------------------------------------------------------  Concurrent Medical Problems    #Airway edema s/p extubation  - S/P dexamethasone treatment     #Neuropathic pain  - No response to increases in gabapentin  - Started on pregabalin 75mg PO BID (12/11/24)    #PAF  #PPM  - PPM [employer], interrogated on 11/25 for MRI - shows measured data WNL, normal pacemaker function, NOT dependent.  - 11/22 TTE: LV systolic fxn normal w/ EF 64%, normal RV systolic fxn, mild to modr TR  - Metoprolol 50mg BID  - Xarelto 20mg PO daily resumed on 12/9 --> Last dose Lovenox on 12/8    #Post op anemia (11/22)   - S/P 5U rRBC- lowest hgb 6.3 - 11/22  - S/P 3U FFP   - Monitor H/H (12.2/37.0 - 12/12)  - Transfuse Hgb <7 PRN    #RUE ecchymosis  #Radial artery occulusion RUE   - 11/22 LED negative  - 11/23 RUE Art Duplex: Right radial artery demonstrates diminished flow velocity at the mid forearm and is occluded at distal forearm. There is retrograde right radial artery flow at the wrist. Surrounding soft tissue edema is noted.   Antegrade flow is noted of the right ulnar artery. Patient's right arm is bruised however is warm, skin is pink, 1+pulse with good capillary refills, discussed results with patient.   - 11/23 and 11/25 RUE/LUE Dopp: negative for upper ext DVT.   - monitor for s/s of ischemia     #HTN/HLD  - triamterene 37.5mg/HCTZ 25mg PO daily  - metoprolol tartrate 50mg PO BID  - rosuvastatin 10mg PO qhs    #Sleep:   - maintain quiet hours and low stim environment    #GI/Bowel:  - senna 2 tablets PO qhs  - Miralax 17g PO BID  GI ppx: pantoprazole PO 40mg daily     #Skin/Pressure Injury Assessment:   - Skin assessment on admission: ST L groin 4.5x1.5cm, 2.5x0.5cm ST mid upper back, 26cm mid thoracic/lumbar incision, scabbed, CDI w/ 3 drain sites, L gluteal blanchable redness with localized pustules, BLUE ecchymosis (R>L)    #Diet  Current Diet: Regular diet  - nutrition consult     #Precautions / PROPHYLAXIS:   - Falls, Spinal  - ortho: Weight bearing status: WBAT, OOB w/ TLSO brace  - Lungs: Aspiration, Incentive Spirometer   - DVT ppx: Xarelto 20mg PO daily started 12/9, SCDs  - BLE Duplex US r/o DVT on admission ---> b/l calf pain  - Pressure injury/Skin: OOB to Chair, PT/OT      ---------------  Outpatient Follow-up:    Jony Allen  Neurosurgery  [address], [address]  [place] [address]  Phone: [phone]  Fax: [phone]  Follow Up Time:     DREW AMAYA  [address], [address]  [place] [address]    --------------

## 2024-12-13 NOTE — DISCHARGE NOTE NURSING/CASE MANAGEMENT/SOCIAL WORK - PATIENT PORTAL LINK FT
You can access the FollowMyHealth Patient Portal offered by [facility] by registering at the following website: [email or web address]. By joining [employer]’s FollowMyHealth portal, you will also be able to view your health information using other applications (apps) compatible with our system.

## 2024-12-14 VITALS — WEIGHT: 160.06 LBS | HEIGHT: 64 IN

## 2024-12-14 PROCEDURE — 99239 HOSP IP/OBS DSCHRG MGMT >30: CPT

## 2024-12-14 RX ADMIN — PANTOPRAZOLE SODIUM 40 MILLIGRAM(S): 40 TABLET, DELAYED RELEASE ORAL at 05:32

## 2024-12-14 RX ADMIN — PREGABALIN 75 MILLIGRAM(S): 75 CAPSULE ORAL at 05:32

## 2024-12-14 RX ADMIN — METOPROLOL TARTRATE 50 MILLIGRAM(S): 100 TABLET, FILM COATED ORAL at 05:33

## 2024-12-14 RX ADMIN — TRIAMTERENE AND HYDROCHLOROTHIAZIDE 1 TABLET(S): 25; 37.5 CAPSULE ORAL at 05:31

## 2024-12-14 NOTE — PROGRESS NOTE ADULT - NUTRITIONAL ASSESSMENT
This patient has been assessed with a concern for Malnutrition and has been determined to have a diagnosis/diagnoses of Moderate protein-calorie malnutrition.    This patient is being managed with:   Diet Regular-  Entered: Dec  6 2024  8:36AM    Diet Regular-  Supplement Feeding Modality:  Oral  Ensure Plus High Protein Cans or Servings Per Day:  1       Frequency:  Daily  Entered: Dec  3 2024 10:46AM  
This patient has been assessed with a concern for Malnutrition and has been determined to have a diagnosis/diagnoses of Moderate protein-calorie malnutrition.    This patient is being managed with:   Diet Regular-  Entered: Dec  6 2024  8:36AM    Diet Regular-  Supplement Feeding Modality:  Oral  Ensure Plus High Protein Cans or Servings Per Day:  1       Frequency:  Daily  Entered: Dec  3 2024 10:46AM  
This patient has been assessed with a concern for Malnutrition and has been determined to have a diagnosis/diagnoses of Moderate protein-calorie malnutrition.    This patient is being managed with:   Diet Regular-  Supplement Feeding Modality:  Oral  Ensure Plus High Protein Cans or Servings Per Day:  1       Frequency:  Daily  Entered: Dec  3 2024 10:46AM    The following pending diet order is being considered for treatment of Moderate protein-calorie malnutrition:  Diet Regular-  Entered: Dec  6 2024  8:36AM  
This patient has been assessed with a concern for Malnutrition and has been determined to have a diagnosis/diagnoses of Moderate protein-calorie malnutrition.    This patient is being managed with:   Diet Regular-  Supplement Feeding Modality:  Oral  Ensure Plus High Protein Cans or Servings Per Day:  1       Frequency:  Daily  Entered: Dec  3 2024 10:46AM    The following pending diet order is being considered for treatment of Moderate protein-calorie malnutrition:  Diet Regular-  Entered: Dec  6 2024  8:36AM  
This patient has been assessed with a concern for Malnutrition and has been determined to have a diagnosis/diagnoses of Moderate protein-calorie malnutrition.    This patient is being managed with:   Diet Regular-  Entered: Dec  6 2024  8:36AM    Diet Regular-  Supplement Feeding Modality:  Oral  Ensure Plus High Protein Cans or Servings Per Day:  1       Frequency:  Daily  Entered: Dec  3 2024 10:46AM  
This patient has been assessed with a concern for Malnutrition and has been determined to have a diagnosis/diagnoses of Moderate protein-calorie malnutrition.    This patient is being managed with:   Diet Regular-  Supplement Feeding Modality:  Oral  Ensure Plus High Protein Cans or Servings Per Day:  1       Frequency:  Daily  Entered: Dec  3 2024 10:46AM    The following pending diet order is being considered for treatment of Moderate protein-calorie malnutrition:  Diet Regular-  Entered: Dec  6 2024  8:36AM  
This patient has been assessed with a concern for Malnutrition and has been determined to have a diagnosis/diagnoses of Moderate protein-calorie malnutrition.    This patient is being managed with:   Diet Regular-  Entered: Dec  6 2024  8:36AM    Diet Regular-  Supplement Feeding Modality:  Oral  Ensure Plus High Protein Cans or Servings Per Day:  1       Frequency:  Daily  Entered: Dec  3 2024 10:46AM  
This patient has been assessed with a concern for Malnutrition and has been determined to have a diagnosis/diagnoses of Moderate protein-calorie malnutrition.    This patient is being managed with:   Diet Regular-  Supplement Feeding Modality:  Oral  Ensure Plus High Protein Cans or Servings Per Day:  1       Frequency:  Daily  Entered: Dec  3 2024 10:46AM    The following pending diet order is being considered for treatment of Moderate protein-calorie malnutrition:  Diet Regular-  Entered: Dec  6 2024  8:36AM  
This patient has been assessed with a concern for Malnutrition and has been determined to have a diagnosis/diagnoses of Moderate protein-calorie malnutrition.    This patient is being managed with:   Diet Regular-  Supplement Feeding Modality:  Oral  Ensure Plus High Protein Cans or Servings Per Day:  1       Frequency:  Daily  Entered: Dec  3 2024 10:46AM  
This patient has been assessed with a concern for Malnutrition and has been determined to have a diagnosis/diagnoses of Moderate protein-calorie malnutrition.    This patient is being managed with:   Diet Regular-  Supplement Feeding Modality:  Oral  Ensure Plus High Protein Cans or Servings Per Day:  1       Frequency:  Daily  Entered: Dec  3 2024 10:46AM    The following pending diet order is being considered for treatment of Moderate protein-calorie malnutrition:  Diet Regular-  Entered: Dec  6 2024  8:36AM  
This patient has been assessed with a concern for Malnutrition and has been determined to have a diagnosis/diagnoses of Moderate protein-calorie malnutrition.    This patient is being managed with:   Diet Regular-  Entered: Dec  6 2024  8:36AM    Diet Regular-  Supplement Feeding Modality:  Oral  Ensure Plus High Protein Cans or Servings Per Day:  1       Frequency:  Daily  Entered: Dec  3 2024 10:46AM  
This patient has been assessed with a concern for Malnutrition and has been determined to have a diagnosis/diagnoses of Moderate protein-calorie malnutrition.    This patient is being managed with:   Diet Regular-  Supplement Feeding Modality:  Oral  Ensure Plus High Protein Cans or Servings Per Day:  1       Frequency:  Daily  Entered: Dec  3 2024 10:46AM  
This patient has been assessed with a concern for Malnutrition and has been determined to have a diagnosis/diagnoses of Moderate protein-calorie malnutrition.    This patient is being managed with:   Diet Regular-  Supplement Feeding Modality:  Oral  Ensure Plus High Protein Cans or Servings Per Day:  1       Frequency:  Daily  Entered: Dec  3 2024 10:46AM    The following pending diet order is being considered for treatment of Moderate protein-calorie malnutrition:  Diet Regular-  Entered: Dec  6 2024  8:36AM

## 2024-12-14 NOTE — PROGRESS NOTE ADULT - ASSESSMENT
Assessment/Plan:  Mrs. Ct Levine 80 y/o F w/ HTN, HLD, Afib atrial flutter, sick sinus syndrome/heart block, atrial flutter s/p cardiac ablation x1 ( 2016), PPM dependent dual chamber status (2013), breast augmentation, and chronic lower back pain who presented to [facility] on 11/20 w/c/o non radiating lower back pain, BLE weakness (R>L) and difficulty ambulating. Surgical management was recommended and she is s/p T11-pelvis instrumentation and fusion; L4-L5, L5-S1 TLIFs, T12-L1, L1-L2, L2-L3, L3-L4 posterior column osteotomies for coronal scoliosis deformity correction with Dr. Allen on 11/20. Post op course c/b post op anemia requiring multiple pRBC, airway edema s/p extubation, treated w/ steroids, dysphagia, persistent R sided weakness/BLE w/paresthesias post op (XR stable, MRI stable posterior fusion, no spinal cord compression; self limiting). Patient was evaluated by PM&R and therapy for functional deficits, gait/ADL impairments and acute rehabilitation was recommended. Patient was cleared for discharge to [facility] IRF on 12/2/24.    #Severe kyphoscoliosis of the thoracolumbar spine with trunk shift and severe stenosis at L4-5 s/p surgery  - S/P  instrumentation and fusion surgery by Dr. Cox (11/20/2024)      * T11-pelvis instrumentation and fusion      * L4-L5, L5-S1 TLIFs      * T12-L1, L1-L2, L2-L3, L3-L4 posterior column osteotomies for coronal scoliosis deformity correction      * Pain management: Tylenol 650mg PO q6h PRN for mild pain, Robaxin 500mg PO TID PRN for muscle spasm, Tramadol 50mg PO q6h PRN, Lyrica 75mg PO BID, lidocaine patches      * TLSO on ambulation  - Follow-up T/L spine MRI (11/25/24)      * Redemonstration of posterior fusion of T11-S1, bilateral sacroiliac fusion and multilevel facetectomies      * No gross evidence for abnormal intrinsic cord signal. No spinal cord compression, no significant spinal canal stenosis in the thoracic region.       * Expected post-op edema throughout the posterior paraspinal soft tissue at the levels of the surgery  - Deficits: Post op persistent BLE weakness/paresthesias   - Continue Rehab Program as outpatient  - Scheduled for discharge home today  - check CT T- and L-spine 12/13/24 due to persistent band-like pain in T6-T10 area    -----------------------------------------------------------------------------------  Concurrent Medical Problems    #Airway edema s/p extubation  - S/P dexamethasone treatment     #Neuropathic pain  - No response to increases in gabapentin  - Started on pregabalin 75mg PO BID (12/11/24)    #PAF  #PPM  - PPM [employer], interrogated on 11/25 for MRI - shows measured data WNL, normal pacemaker function, NOT dependent.  - 11/22 TTE: LV systolic fxn normal w/ EF 64%, normal RV systolic fxn, mild to modr TR  - Metoprolol 50mg BID  - Xarelto 20mg PO daily resumed on 12/9 --> Last dose Lovenox on 12/8    #Post op anemia (11/22)   - S/P 5U rRBC- lowest hgb 6.3 - 11/22  - S/P 3U FFP   - Monitor H/H (12.2/37.0 - 12/12)  - Transfuse Hgb <7 PRN    #RUE ecchymosis  #Radial artery occulusion RUE   - 11/22 LED negative  - 11/23 RUE Art Duplex: Right radial artery demonstrates diminished flow velocity at the mid forearm and is occluded at distal forearm. There is retrograde right radial artery flow at the wrist. Surrounding soft tissue edema is noted.   Antegrade flow is noted of the right ulnar artery. Patient's right arm is bruised however is warm, skin is pink, 1+pulse with good capillary refills, discussed results with patient.   - 11/23 and 11/25 RUE/LUE Dopp: negative for upper ext DVT.   - monitor for s/s of ischemia     #HTN/HLD  - triamterene 37.5mg/HCTZ 25mg PO daily  - metoprolol tartrate 50mg PO BID  - rosuvastatin 10mg PO qhs    #Sleep:   - maintain quiet hours and low stim environment    #GI/Bowel:  - senna 2 tablets PO qhs  - Miralax 17g PO BID  GI ppx: pantoprazole PO 40mg daily     #Skin/Pressure Injury Assessment:   - Skin assessment on admission: ST L groin 4.5x1.5cm, 2.5x0.5cm ST mid upper back, 26cm mid thoracic/lumbar incision, scabbed, CDI w/ 3 drain sites, L gluteal blanchable redness with localized pustules, BLUE ecchymosis (R>L)    #Diet  Current Diet: Regular diet  - nutrition consult     #Precautions / PROPHYLAXIS:   - Falls, Spinal  - ortho: Weight bearing status: WBAT, OOB w/ TLSO brace  - Lungs: Aspiration, Incentive Spirometer   - DVT ppx: Xarelto 20mg PO daily started 12/9, SCDs  - BLE Duplex US r/o DVT on admission ---> b/l calf pain  - Pressure injury/Skin: OOB to Chair, PT/OT      ---------------  Outpatient Follow-up:    Jony Allen  Neurosurgery  [address], [address]  [place] [address]  Phone: [phone]  Fax: [phone]  Follow Up Time:     DREW AMAYA  [address], [address]  [place] [address]    --------------

## 2024-12-14 NOTE — PROGRESS NOTE ADULT - SUBJECTIVE AND OBJECTIVE BOX
HPI:  Mrs. Ct Levine 80 y/o F w/ HTN, HLD, Afib atrial flutter, sick sinus syndrome/heart block, atrial flutter s/p cardiac ablation x1 ( 2016), PPM dependent dual chamber status (2013), breast augmentation, and chronic lower back pain who presented to [facility] on 11/20 w/c/o non radiating lower back pain, BLE weakness (R>L) and difficulty ambulating. Surgical management was recommended and she is s/p T11-pelvis instrumentation and fusion; L4-L5, L5-S1 TLIFs, T12-L1, L1-L2, L2-L3, L3-L4 posterior column osteotomies for coronal scoliosis deformity correction with Dr. Allen on 11/20. Post op course c/b post op anemia requiring multiple pRBC, airway edema s/p extubation, treated w/ steroids, dysphagia, persistent R sided weakness/BLE w/paresthesias post op (XR stable, MRI stable posterior fusion, no spinal cord compression; self limiting). Patient was evaluated by PM&R and therapy for functional deficits, gait/ADL impairments and acute rehabilitation was recommended. Patient was cleared for discharge to [facility] on 12/2/24. (02 Dec 2024 13:13)    TDD 12/14/24 to home  ___________________________________________________________________________    SUBJECTIVE/ROS  Patient was seen and evaluated at bedside today.  Reported no overnight events and is in no acute distress.   She does continue to have band-like pain in the T6-T10 area R>L.  Foraminal narrowing note on CT imaging may contribute.  Patient educated to further discuss with spine surgeon on her upcoming follow-up.  Scheduled for discharge home today.  Denies any CP, SOB, palpitations, cough, or any other symptoms at this time.   ___________________________________________________________________________    CT THORACIC SPINE:  PROCEDURE DATE:  12/13/2024  IMPRESSION: Status post T11-S1 posterior spinal fusion with hardware. No acute fracture or suspicious osseous lesion. There is thoracic scoliosis, convex to the left, with apex at T9-T10. No thoracic disc herniation or spinal canal stenosis is visualized by CT technique. There is mild right-sided neural foraminal narrowing at T7-T8 and moderate right-sided neural foraminal narrowing at T8-T9 and T9-T10.    CT LUMBAR SPINE:  PROCEDURE DATE:  12/13/2024  IMPRESSION: Status post T11-S1 posterior spinal fusion with hardware. No acute fracture or suspicious osseous lesion. There is lumbar scoliosis; predominant curvature is convex to the right with the apex at L1-L2, and there is minimal curvature convex to the left with the apex at L3-L4. Intraspinal contents are poorly evaluated at the L4-L5 and L5-S1 levels due to streak artifact from spinal hardware.  No lumbar disc herniation, spinal canal stenosis or neural foraminal narrowing is identified by CT technique.  ___________________________________________________________________________    LABS             12.2   6.15  )-----------( 362      ( 12 Dec 2024 07:39 )             37.0     133[L]  |  96  |  10  ----------------------------<  110[H]  3.5   |  30  |  0.59    Ca    9.6      12 Dec 2024 07:39  TPro  7.3  /  Alb  2.8[L]  /  TBili  0.6  /  DBili  x   /  AST  18  /  ALT  22  /  AlkPhos  112  12-12    ___________________________________________________________________________    MEDICATIONS  (STANDING):  lidocaine   4% Patch 1 Patch Transdermal every 24 hours  metoprolol tartrate 50 milliGRAM(s) Oral two times a day  pantoprazole    Tablet 40 milliGRAM(s) Oral before breakfast  polyethylene glycol 3350 17 Gram(s) Oral two times a day  pregabalin 75 milliGRAM(s) Oral two times a day  rivaroxaban 20 milliGRAM(s) Oral daily  rosuvastatin 10 milliGRAM(s) Oral at bedtime  senna 2 Tablet(s) Oral at bedtime  triamterene 37.5 mG/hydrochlorothiazide 25 mG Tablet 1 Tablet(s) Oral daily    MEDICATIONS  (PRN):  acetaminophen     Tablet .. 650 milliGRAM(s) Oral every 6 hours PRN Temp greater or equal to 38C (100.4F), Mild Pain (1 - 3)  methocarbamol 500 milliGRAM(s) Oral every 8 hours PRN Muscle Spasm  traMADol 50 milliGRAM(s) Oral every 6 hours PRN Moderate Pain (4 - 6)    ___________________________________________________________________________    PHYSICAL EXAM:  Vital Signs Last 24 Hrs  T(C): 36.7 (14 Dec 2024 08:31), Max: 37.1 (13 Dec 2024 09:45)  T(F): 98.1 (14 Dec 2024 08:31), Max: 98.8 (13 Dec 2024 09:45)  HR: 59 (14 Dec 2024 08:31) (58 - 74)  BP: 122/72 (14 Dec 2024 08:31) (122/63 - 126/62)  RR: 16 (14 Dec 2024 08:31) (16 - 16)  SpO2: 96% (14 Dec 2024 08:31) (96% - 97%)    Constitutional - NAD, Comfortable  Chest - good chest expansion, good respiratory effort  Cardio - warm and well perfused  Neurologic Exam:                           Orientation: Awake, A&O to self, place, date, year, situation     Speech - Fluent, Comprehensible, No dysarthria, No aphasia      Motor - Seen in PT doing multiple R-sided gluteus medius exercises; able to do clam shells but not leg raises (with straight leg); L glut med stronger than right. Able to transfer from supine to sitting at edge of bed and from edge of bed to WC with CG only   Psychiatric - Mood stable, Affect WNL    ___________________________________________________________________________

## 2024-12-14 NOTE — PROGRESS NOTE ADULT - PROVIDER SPECIALTY LIST ADULT
Rehab Medicine
Rehab Medicine
Hospitalist
Hospitalist
Physiatry
Hospitalist
Physiatry
Hospitalist
Hospitalist
Physiatry
Hospitalist
Physiatry
Hospitalist

## 2024-12-19 ENCOUNTER — APPOINTMENT (OUTPATIENT)
Dept: NEUROSURGERY | Facility: CLINIC | Age: 79
End: 2024-12-19
Payer: MEDICARE

## 2024-12-19 VITALS
OXYGEN SATURATION: 93 % | DIASTOLIC BLOOD PRESSURE: 75 MMHG | SYSTOLIC BLOOD PRESSURE: 129 MMHG | BODY MASS INDEX: 27.31 KG/M2 | HEART RATE: 71 BPM | HEIGHT: 64 IN | WEIGHT: 160 LBS

## 2024-12-19 DIAGNOSIS — G89.29 LOW BACK PAIN, UNSPECIFIED: ICD-10-CM

## 2024-12-19 DIAGNOSIS — M41.20 OTHER IDIOPATHIC SCOLIOSIS, SITE UNSPECIFIED: ICD-10-CM

## 2024-12-19 DIAGNOSIS — M54.50 LOW BACK PAIN, UNSPECIFIED: ICD-10-CM

## 2024-12-19 PROCEDURE — 99024 POSTOP FOLLOW-UP VISIT: CPT

## 2024-12-19 RX ORDER — PREGABALIN 300 MG/1
CAPSULE ORAL
Refills: 0 | Status: ACTIVE | COMMUNITY

## 2024-12-30 RX ORDER — PREGABALIN 75 MG/1
1 CAPSULE ORAL
Qty: 60 | Refills: 0
Start: 2024-12-30 | End: 2025-01-28

## 2025-01-15 PROCEDURE — 36415 COLL VENOUS BLD VENIPUNCTURE: CPT

## 2025-01-15 PROCEDURE — 97110 THERAPEUTIC EXERCISES: CPT | Mod: GO

## 2025-01-15 PROCEDURE — 87635 SARS-COV-2 COVID-19 AMP PRB: CPT

## 2025-01-15 PROCEDURE — 97165 OT EVAL LOW COMPLEX 30 MIN: CPT | Mod: GO

## 2025-01-15 PROCEDURE — 80053 COMPREHEN METABOLIC PANEL: CPT

## 2025-01-15 PROCEDURE — 72131 CT LUMBAR SPINE W/O DYE: CPT | Mod: MC

## 2025-01-15 PROCEDURE — 93005 ELECTROCARDIOGRAM TRACING: CPT

## 2025-01-15 PROCEDURE — 93970 EXTREMITY STUDY: CPT

## 2025-01-15 PROCEDURE — 97116 GAIT TRAINING THERAPY: CPT | Mod: GP

## 2025-01-15 PROCEDURE — 97535 SELF CARE MNGMENT TRAINING: CPT | Mod: GO

## 2025-01-15 PROCEDURE — 85025 COMPLETE CBC W/AUTO DIFF WBC: CPT

## 2025-01-15 PROCEDURE — 97161 PT EVAL LOW COMPLEX 20 MIN: CPT | Mod: GP

## 2025-01-15 PROCEDURE — 97112 NEUROMUSCULAR REEDUCATION: CPT | Mod: GP

## 2025-01-15 PROCEDURE — 97530 THERAPEUTIC ACTIVITIES: CPT | Mod: GP

## 2025-01-15 PROCEDURE — 72128 CT CHEST SPINE W/O DYE: CPT | Mod: MC

## (undated) DEVICE — DRSG PREVENA PLUS SYSTEM

## (undated) DEVICE — WARMING BLANKET LOWER ADULT

## (undated) DEVICE — VENODYNE/SCD SLEEVE CALF MEDIUM

## (undated) DEVICE — MISONIX BONESCALPEL BLUNT BLADE & TUBESET 20MM

## (undated) DEVICE — SUT VICRYL PLUS 2-0 18" CP-2 UNDYED (POP-OFF)

## (undated) DEVICE — SUT VICRYL PLUS 0 18" OS-6 (POP-OFF)

## (undated) DEVICE — GLV 7 PROTEXIS (WHITE)

## (undated) DEVICE — TUBING BIPOLAR IRRIGATOR AND CORD SET

## (undated) DEVICE — DRAPE BACK TABLE COVER HEAVY DUTY 60"

## (undated) DEVICE — STRYKER BONE MILL BLADE FINE 3.2MM

## (undated) DEVICE — GLV 7.5 PROTEXIS (WHITE)

## (undated) DEVICE — DRAPE TOWEL BLUE 17" X 24"

## (undated) DEVICE — GLV 8 PROTEXIS (WHITE)

## (undated) DEVICE — GLV 6.5 PROTEXIS (WHITE)

## (undated) DEVICE — DEPUY CANNULA FEN OPEN STERILE

## (undated) DEVICE — SOL IRR POUR NS 0.9% 500ML

## (undated) DEVICE — DRAPE STERILE-Z PATIENT

## (undated) DEVICE — FRAZIER SUCTION TIP 12FR

## (undated) DEVICE — ELCTR BOVIE TIP BLADE INSULATED 2.75" EDGE

## (undated) DEVICE — FRAZIER SUCTION TIP 8FR

## (undated) DEVICE — DRAIN JACKSON PRATT 3 SPRING RESERVOIR W 10FR PVC DRAIN

## (undated) DEVICE — ELCTR GROUNDING PAD ADULT COVIDIEN

## (undated) DEVICE — ELCTR AQUAMANTYS BIPOLAR SEALER 6.0

## (undated) DEVICE — PACK LUMBAR LAMI

## (undated) DEVICE — ELCTR BOVIE PENCIL HANDPIECE

## (undated) DEVICE — MISONIX BONESCALPEL DIAMOND SHAVER & TUBESET

## (undated) DEVICE — PACK UNIVERSAL DRAPE

## (undated) DEVICE — NEURO SURGICAL STRIP 1/4 X 6"

## (undated) DEVICE — DRAPE GENERAL ENDOSCOPY

## (undated) DEVICE — CATH IV SAFE INSYTE 14G X 1.75" (ORANGE)

## (undated) DEVICE — SUT VICRYL PLUS 0 18" CT-1 UNDYED (POP-OFF)

## (undated) DEVICE — MIDAS REX LEGEND MATCH HEAD FLUTED LG BORE 3.0MM X 14CM

## (undated) DEVICE — FOLEY TRAY 16FR LF URINE METER SURESTEP

## (undated) DEVICE — SOL IRR POUR H2O 250ML

## (undated) DEVICE — GLV 8.5 PROTEXIS (WHITE)

## (undated) DEVICE — SUCTION CHICAGO TIP 12FR

## (undated) DEVICE — NEURO SURGICAL STRIP 1/2 X 6"

## (undated) DEVICE — MEDICATION LABELS W MARKER

## (undated) DEVICE — MIDAS REX MR7 LUBRICANT DIFFUSER CARTRIDGE